# Patient Record
Sex: MALE | Race: WHITE | Employment: FULL TIME | URBAN - NONMETROPOLITAN AREA
[De-identification: names, ages, dates, MRNs, and addresses within clinical notes are randomized per-mention and may not be internally consistent; named-entity substitution may affect disease eponyms.]

---

## 2017-07-09 PROBLEM — F19.929 INTOXICATION BY DRUG (HCC): Status: ACTIVE | Noted: 2017-07-09

## 2017-07-09 PROBLEM — E11.9 DM (DIABETES MELLITUS) (HCC): Chronic | Status: ACTIVE | Noted: 2017-07-09

## 2017-07-09 PROBLEM — R41.82 ALTERED MENTAL STATUS, UNSPECIFIED: Status: ACTIVE | Noted: 2017-07-09

## 2017-07-09 PROBLEM — E10.65 UNCONTROLLED TYPE 1 DIABETES MELLITUS WITH HYPERGLYCEMIA (HCC): Chronic | Status: ACTIVE | Noted: 2017-07-09

## 2017-07-09 PROBLEM — I10 ESSENTIAL (PRIMARY) HYPERTENSION: Chronic | Status: ACTIVE | Noted: 2017-07-09

## 2018-09-17 ENCOUNTER — APPOINTMENT (OUTPATIENT)
Dept: GENERAL RADIOLOGY | Age: 43
DRG: 637 | End: 2018-09-17
Payer: OTHER GOVERNMENT

## 2018-09-17 ENCOUNTER — HOSPITAL ENCOUNTER (INPATIENT)
Age: 43
LOS: 4 days | Discharge: HOME | DRG: 637 | End: 2018-09-21
Attending: EMERGENCY MEDICINE | Admitting: FAMILY MEDICINE
Payer: OTHER GOVERNMENT

## 2018-09-17 DIAGNOSIS — R11.2 NAUSEA AND VOMITING, INTRACTABILITY OF VOMITING NOT SPECIFIED, UNSPECIFIED VOMITING TYPE: ICD-10-CM

## 2018-09-17 DIAGNOSIS — E10.10 DIABETIC KETOACIDOSIS WITHOUT COMA ASSOCIATED WITH TYPE 1 DIABETES MELLITUS (HCC): Primary | ICD-10-CM

## 2018-09-17 PROBLEM — R51.9 HEADACHE: Status: ACTIVE | Noted: 2018-09-17

## 2018-09-17 LAB
ALBUMIN SERPL-MCNC: 4.5 G/DL (ref 3.5–5.1)
ALLEN TEST: POSITIVE
ALP BLD-CCNC: 85 U/L (ref 38–126)
ALT SERPL-CCNC: 19 U/L (ref 11–66)
ANION GAP SERPL CALCULATED.3IONS-SCNC: 26 MEQ/L (ref 8–16)
ANION GAP SERPL CALCULATED.3IONS-SCNC: 36 MEQ/L (ref 8–16)
AST SERPL-CCNC: 21 U/L (ref 5–40)
BASE EXCESS (CALCULATED): -12.8 MMOL/L (ref -2.5–2.5)
BASOPHILS # BLD: 0.3 %
BASOPHILS ABSOLUTE: 0 THOU/MM3 (ref 0–0.1)
BILIRUB SERPL-MCNC: 0.4 MG/DL (ref 0.3–1.2)
BILIRUBIN DIRECT: < 0.2 MG/DL (ref 0–0.3)
BUN BLDV-MCNC: 38 MG/DL (ref 7–22)
BUN BLDV-MCNC: 40 MG/DL (ref 7–22)
CALCIUM SERPL-MCNC: 8.2 MG/DL (ref 8.5–10.5)
CALCIUM SERPL-MCNC: 8.7 MG/DL (ref 8.5–10.5)
CHLORIDE BLD-SCNC: 87 MEQ/L (ref 98–111)
CHLORIDE BLD-SCNC: 93 MEQ/L (ref 98–111)
CO2: 12 MEQ/L (ref 23–33)
CO2: 9 MEQ/L (ref 23–33)
COLLECTED BY:: ABNORMAL
CREAT SERPL-MCNC: 1.4 MG/DL (ref 0.4–1.2)
CREAT SERPL-MCNC: 1.5 MG/DL (ref 0.4–1.2)
DEVICE: ABNORMAL
EOSINOPHIL # BLD: 0 %
EOSINOPHILS ABSOLUTE: 0 THOU/MM3 (ref 0–0.4)
ERYTHROCYTE [DISTWIDTH] IN BLOOD BY AUTOMATED COUNT: 11.6 % (ref 11.5–14.5)
ERYTHROCYTE [DISTWIDTH] IN BLOOD BY AUTOMATED COUNT: 36.6 FL (ref 35–45)
GFR SERPL CREATININE-BSD FRML MDRD: 51 ML/MIN/1.73M2
GFR SERPL CREATININE-BSD FRML MDRD: 55 ML/MIN/1.73M2
GLUCOSE BLD-MCNC: 303 MG/DL (ref 70–108)
GLUCOSE BLD-MCNC: 314 MG/DL (ref 70–108)
GLUCOSE BLD-MCNC: 403 MG/DL (ref 70–108)
HCO3: 13 MMOL/L (ref 23–28)
HCT VFR BLD CALC: 47.4 % (ref 42–52)
HEMOGLOBIN: 16.9 GM/DL (ref 14–18)
IMMATURE GRANS (ABS): 0.04 THOU/MM3 (ref 0–0.07)
IMMATURE GRANULOCYTES: 0.5 %
LACTIC ACID: 2 MMOL/L (ref 0.5–2.2)
LYMPHOCYTES # BLD: 10.8 %
LYMPHOCYTES ABSOLUTE: 0.8 THOU/MM3 (ref 1–4.8)
MAGNESIUM: 2.2 MG/DL (ref 1.6–2.4)
MAGNESIUM: 2.3 MG/DL (ref 1.6–2.4)
MCH RBC QN AUTO: 30.8 PG (ref 26–33)
MCHC RBC AUTO-ENTMCNC: 35.7 GM/DL (ref 32.2–35.5)
MCV RBC AUTO: 86.5 FL (ref 80–94)
MONOCYTES # BLD: 2.3 %
MONOCYTES ABSOLUTE: 0.2 THOU/MM3 (ref 0.4–1.3)
NUCLEATED RED BLOOD CELLS: 0 /100 WBC
O2 SATURATION: 97 %
PCO2: 30 MMHG (ref 35–45)
PH BLOOD GAS: 7.25 (ref 7.35–7.45)
PHOSPHORUS: 2.9 MG/DL (ref 2.4–4.7)
PLATELET # BLD: 218 THOU/MM3 (ref 130–400)
PMV BLD AUTO: 10.8 FL (ref 9.4–12.4)
PO2: 106 MMHG (ref 71–104)
POTASSIUM SERPL-SCNC: 4.3 MEQ/L (ref 3.5–5.2)
POTASSIUM SERPL-SCNC: 4.8 MEQ/L (ref 3.5–5.2)
RBC # BLD: 5.48 MILL/MM3 (ref 4.7–6.1)
SEG NEUTROPHILS: 86.1 %
SEGMENTED NEUTROPHILS ABSOLUTE COUNT: 6.4 THOU/MM3 (ref 1.8–7.7)
SODIUM BLD-SCNC: 131 MEQ/L (ref 135–145)
SODIUM BLD-SCNC: 132 MEQ/L (ref 135–145)
SOURCE, BLOOD GAS: ABNORMAL
TOTAL PROTEIN: 7.4 G/DL (ref 6.1–8)
TROPONIN T: < 0.01 NG/ML
WBC # BLD: 7.4 THOU/MM3 (ref 4.8–10.8)

## 2018-09-17 PROCEDURE — 83735 ASSAY OF MAGNESIUM: CPT

## 2018-09-17 PROCEDURE — 83605 ASSAY OF LACTIC ACID: CPT

## 2018-09-17 PROCEDURE — 80048 BASIC METABOLIC PNL TOTAL CA: CPT

## 2018-09-17 PROCEDURE — 2060000000 HC ICU INTERMEDIATE R&B

## 2018-09-17 PROCEDURE — 80305 DRUG TEST PRSMV DIR OPT OBS: CPT

## 2018-09-17 PROCEDURE — 83036 HEMOGLOBIN GLYCOSYLATED A1C: CPT

## 2018-09-17 PROCEDURE — 82948 REAGENT STRIP/BLOOD GLUCOSE: CPT

## 2018-09-17 PROCEDURE — 96374 THER/PROPH/DIAG INJ IV PUSH: CPT

## 2018-09-17 PROCEDURE — 2709999900 HC NON-CHARGEABLE SUPPLY

## 2018-09-17 PROCEDURE — 99223 1ST HOSP IP/OBS HIGH 75: CPT | Performed by: FAMILY MEDICINE

## 2018-09-17 PROCEDURE — 99285 EMERGENCY DEPT VISIT HI MDM: CPT

## 2018-09-17 PROCEDURE — 36415 COLL VENOUS BLD VENIPUNCTURE: CPT

## 2018-09-17 PROCEDURE — 2580000003 HC RX 258: Performed by: FAMILY MEDICINE

## 2018-09-17 PROCEDURE — 36600 WITHDRAWAL OF ARTERIAL BLOOD: CPT

## 2018-09-17 PROCEDURE — 6370000000 HC RX 637 (ALT 250 FOR IP): Performed by: EMERGENCY MEDICINE

## 2018-09-17 PROCEDURE — 85025 COMPLETE CBC W/AUTO DIFF WBC: CPT

## 2018-09-17 PROCEDURE — 84484 ASSAY OF TROPONIN QUANT: CPT

## 2018-09-17 PROCEDURE — 84100 ASSAY OF PHOSPHORUS: CPT

## 2018-09-17 PROCEDURE — 87641 MR-STAPH DNA AMP PROBE: CPT

## 2018-09-17 PROCEDURE — 6360000002 HC RX W HCPCS: Performed by: EMERGENCY MEDICINE

## 2018-09-17 PROCEDURE — 87081 CULTURE SCREEN ONLY: CPT

## 2018-09-17 PROCEDURE — 80076 HEPATIC FUNCTION PANEL: CPT

## 2018-09-17 PROCEDURE — 82803 BLOOD GASES ANY COMBINATION: CPT

## 2018-09-17 PROCEDURE — 71046 X-RAY EXAM CHEST 2 VIEWS: CPT

## 2018-09-17 PROCEDURE — 2580000003 HC RX 258: Performed by: EMERGENCY MEDICINE

## 2018-09-17 RX ORDER — ONDANSETRON 2 MG/ML
4 INJECTION INTRAMUSCULAR; INTRAVENOUS EVERY 6 HOURS PRN
Status: DISCONTINUED | OUTPATIENT
Start: 2018-09-17 | End: 2018-09-21 | Stop reason: HOSPADM

## 2018-09-17 RX ORDER — GABAPENTIN 600 MG/1
600 TABLET ORAL 3 TIMES DAILY
Status: DISCONTINUED | OUTPATIENT
Start: 2018-09-17 | End: 2018-09-21 | Stop reason: HOSPADM

## 2018-09-17 RX ORDER — 0.9 % SODIUM CHLORIDE 0.9 %
15 INTRAVENOUS SOLUTION INTRAVENOUS ONCE
Status: COMPLETED | OUTPATIENT
Start: 2018-09-17 | End: 2018-09-18

## 2018-09-17 RX ORDER — ASPIRIN 81 MG/1
81 TABLET, CHEWABLE ORAL DAILY
Status: DISCONTINUED | OUTPATIENT
Start: 2018-09-18 | End: 2018-09-21 | Stop reason: HOSPADM

## 2018-09-17 RX ORDER — PANTOPRAZOLE SODIUM 40 MG/10ML
40 INJECTION, POWDER, LYOPHILIZED, FOR SOLUTION INTRAVENOUS ONCE
Status: DISCONTINUED | OUTPATIENT
Start: 2018-09-17 | End: 2018-09-21 | Stop reason: HOSPADM

## 2018-09-17 RX ORDER — POTASSIUM CHLORIDE 7.45 MG/ML
10 INJECTION INTRAVENOUS PRN
Status: DISCONTINUED | OUTPATIENT
Start: 2018-09-17 | End: 2018-09-21 | Stop reason: HOSPADM

## 2018-09-17 RX ORDER — PANTOPRAZOLE SODIUM 40 MG/1
40 TABLET, DELAYED RELEASE ORAL DAILY
Status: DISCONTINUED | OUTPATIENT
Start: 2018-09-18 | End: 2018-09-21 | Stop reason: HOSPADM

## 2018-09-17 RX ORDER — DEXTROSE MONOHYDRATE 25 G/50ML
12.5 INJECTION, SOLUTION INTRAVENOUS PRN
Status: DISCONTINUED | OUTPATIENT
Start: 2018-09-17 | End: 2018-09-17 | Stop reason: SDUPTHER

## 2018-09-17 RX ORDER — SODIUM CHLORIDE 9 MG/ML
INJECTION, SOLUTION INTRAVENOUS CONTINUOUS
Status: DISCONTINUED | OUTPATIENT
Start: 2018-09-17 | End: 2018-09-20

## 2018-09-17 RX ORDER — ACETAMINOPHEN 325 MG/1
650 TABLET ORAL EVERY 4 HOURS PRN
Status: DISCONTINUED | OUTPATIENT
Start: 2018-09-17 | End: 2018-09-21 | Stop reason: HOSPADM

## 2018-09-17 RX ORDER — LANOLIN ALCOHOL/MO/W.PET/CERES
1000 CREAM (GRAM) TOPICAL DAILY
Status: DISCONTINUED | OUTPATIENT
Start: 2018-09-18 | End: 2018-09-21 | Stop reason: HOSPADM

## 2018-09-17 RX ORDER — SIMVASTATIN 20 MG
20 TABLET ORAL NIGHTLY
Status: DISCONTINUED | OUTPATIENT
Start: 2018-09-17 | End: 2018-09-21 | Stop reason: HOSPADM

## 2018-09-17 RX ORDER — SODIUM CHLORIDE 9 MG/ML
INJECTION, SOLUTION INTRAVENOUS CONTINUOUS
Status: DISCONTINUED | OUTPATIENT
Start: 2018-09-17 | End: 2018-09-18

## 2018-09-17 RX ORDER — HYDROCODONE BITARTRATE AND ACETAMINOPHEN 5; 325 MG/1; MG/1
1 TABLET ORAL ONCE
Status: COMPLETED | OUTPATIENT
Start: 2018-09-17 | End: 2018-09-17

## 2018-09-17 RX ORDER — DEXTROSE AND SODIUM CHLORIDE 5; .45 G/100ML; G/100ML
INJECTION, SOLUTION INTRAVENOUS CONTINUOUS PRN
Status: DISCONTINUED | OUTPATIENT
Start: 2018-09-17 | End: 2018-09-21 | Stop reason: HOSPADM

## 2018-09-17 RX ORDER — HEPARIN SODIUM 5000 [USP'U]/ML
5000 INJECTION, SOLUTION INTRAVENOUS; SUBCUTANEOUS EVERY 12 HOURS
Status: DISCONTINUED | OUTPATIENT
Start: 2018-09-17 | End: 2018-09-21 | Stop reason: HOSPADM

## 2018-09-17 RX ORDER — 0.9 % SODIUM CHLORIDE 0.9 %
1000 INTRAVENOUS SOLUTION INTRAVENOUS ONCE
Status: COMPLETED | OUTPATIENT
Start: 2018-09-17 | End: 2018-09-17

## 2018-09-17 RX ORDER — ONDANSETRON 2 MG/ML
4 INJECTION INTRAMUSCULAR; INTRAVENOUS ONCE
Status: COMPLETED | OUTPATIENT
Start: 2018-09-17 | End: 2018-09-17

## 2018-09-17 RX ORDER — DEXTROSE MONOHYDRATE 25 G/50ML
12.5 INJECTION, SOLUTION INTRAVENOUS PRN
Status: DISCONTINUED | OUTPATIENT
Start: 2018-09-17 | End: 2018-09-21 | Stop reason: HOSPADM

## 2018-09-17 RX ADMIN — SODIUM CHLORIDE 4.9 UNITS/HR: 9 INJECTION, SOLUTION INTRAVENOUS at 22:03

## 2018-09-17 RX ADMIN — SODIUM CHLORIDE 987 ML: 9 INJECTION, SOLUTION INTRAVENOUS at 23:14

## 2018-09-17 RX ADMIN — SODIUM CHLORIDE 1000 ML: 9 INJECTION, SOLUTION INTRAVENOUS at 19:51

## 2018-09-17 RX ADMIN — ONDANSETRON 4 MG: 2 INJECTION INTRAMUSCULAR; INTRAVENOUS at 19:57

## 2018-09-17 RX ADMIN — HYDROCODONE BITARTRATE AND ACETAMINOPHEN 1 TABLET: 5; 325 TABLET ORAL at 19:57

## 2018-09-17 RX ADMIN — SODIUM CHLORIDE 1000 ML: 9 INJECTION, SOLUTION INTRAVENOUS at 22:03

## 2018-09-17 RX ADMIN — SODIUM CHLORIDE: 9 INJECTION, SOLUTION INTRAVENOUS at 21:32

## 2018-09-17 ASSESSMENT — ENCOUNTER SYMPTOMS
NAUSEA: 1
ABDOMINAL PAIN: 0
VOMITING: 1
SHORTNESS OF BREATH: 0
WHEEZING: 0
RHINORRHEA: 0
CONSTIPATION: 0
BLOOD IN STOOL: 0
DIARRHEA: 0
BACK PAIN: 0
SORE THROAT: 0
COUGH: 0

## 2018-09-17 ASSESSMENT — PAIN DESCRIPTION - LOCATION: LOCATION: HEAD

## 2018-09-17 ASSESSMENT — PAIN DESCRIPTION - FREQUENCY: FREQUENCY: CONTINUOUS

## 2018-09-17 ASSESSMENT — PAIN SCALES - GENERAL
PAINLEVEL_OUTOF10: 0
PAINLEVEL_OUTOF10: 7

## 2018-09-17 ASSESSMENT — PAIN DESCRIPTION - PAIN TYPE: TYPE: ACUTE PAIN

## 2018-09-17 ASSESSMENT — PAIN DESCRIPTION - DESCRIPTORS: DESCRIPTORS: HEADACHE

## 2018-09-17 NOTE — LETTER
[facility] STEPDOWN TELEMETRY 4K  [facility] [address]  Phone: [phone]             September 21, 2018    Patient: Jelani Marquez   YOB: 1975   Date of Visit: 9/17/2018       To Whom It May Concern:    Ghada Zamora was seen and treated in our facility  beginning 9/17/2018 until . He may return to work on 9/26/18.       Sincerely,       Cleopatra Lepe RN         Signature:__________________________________

## 2018-09-17 NOTE — ED PROVIDER NOTES
[address] [address]   eMERGENCY dEPARTMENT eNCOUnter        [facility]    Chief Complaint   Patient presents with    Emesis    Headache    Generalized Body Aches       Nurses Notes reviewed and I agree except as noted in the HPI. HPI    Darshan Walters is a 37 y.o. male with a past medical history of DM and DKA who presents to the ED for evaluation of nausea and myalgias. The patient has a three day history of nausea and vomiting with multiple episodes of emesis per day. The patient has been unable to tolerate any PO fluids or solids secondary to the nausea. He does not have any abdominal pain associated with the nausea and vomiting, however, has mild cramping just prior to having an episode of emesis. No diarrhea, last normal bowel movement was 4 days ago. The patient has been experiencing generalized myalgias, fatigue, and headache over the past three days as well. His headache has been intermittent, described as a dull sensation, and currently a 8/10 in severity. No known fevers, but he has had chills. No known sick contacts or food exposure, however, the patient works in [employer]. No additional complaints or concerns at the time of initial evaluation. REVIEW OF SYSTEMS    Review of Systems   Constitutional: Positive for appetite change, chills and fatigue. Negative for diaphoresis and fever. HENT: Negative for congestion, rhinorrhea and sore throat. Respiratory: Negative for cough, shortness of breath and wheezing. Cardiovascular: Negative for chest pain, palpitations and leg swelling. Gastrointestinal: Positive for nausea and vomiting. Negative for abdominal pain, blood in stool, constipation and diarrhea. Genitourinary: Negative for decreased urine volume, difficulty urinating, dysuria, frequency, hematuria and urgency. Musculoskeletal: Positive for myalgias.  Negative for arthralgias, back pain, joint swelling and neck FILTRATION RATE, ESTIMATED - Abnormal; Notable for the following:     Est, Glom Filt Rate 51 (*)     All other components within normal limits   BLOOD GAS, ARTERIAL - Abnormal; Notable for the following:     pH, Blood Gas 7.25 (*)     PCO2 30 (*)     PO2 106 (*)     HCO3 13 (*)     Base Excess (Calculated) -12.8 (*)     All other components within normal limits   POCT GLUCOSE - Abnormal; Notable for the following:     POC Glucose 303 (*)     All other components within normal limits   HEPATIC FUNCTION PANEL   MAGNESIUM   LACTIC ACID, PLASMA   HEMOGLOBIN A1C   REFERENCE LAB SAMPLE   URINE RT REFLEX TO CULTURE   POCT GLUCOSE   POCT GLUCOSE   POCT GLUCOSE     All other unresulted laboratory test above are normal:    Vitals:    Vitals:    09/17/18 1843 09/17/18 1952 09/17/18 2100   BP: 132/78 134/87 133/68   Pulse: 88 84 89   Resp: 15 14 14   Temp: 97.5 °F (36.4 °C)     TempSrc: Oral     SpO2: 100% 100% 99%   Weight: 145 lb (65.8 kg)     Height: 5' 10\" (1.778 m)         EMERGENCY DEPARTMENT COURSE:    Medications   0.9 % sodium chloride infusion ( Intravenous New Bag 9/17/18 2132)   dextrose 50 % solution 12.5 g (not administered)   insulin regular (HUMULIN R;NOVOLIN R) 100 Units in sodium chloride 0.9 % 100 mL infusion (not administered)   0.9 % sodium chloride bolus (not administered)   pantoprazole (PROTONIX) injection 40 mg (not administered)   0.9 % sodium chloride bolus (0 mLs Intravenous Stopped 9/17/18 2132)   ondansetron (ZOFRAN) injection 4 mg (4 mg Intravenous Given 9/17/18 1957)   HYDROcodone-acetaminophen (NORCO) 5-325 MG per tablet 1 tablet (1 tablet Oral Given 9/17/18 1957)       The pt was seen and evaluated by me. Within the department, I observed the pt's vital signs to be within acceptable range . Laboratory and Radiological studies were performed, results were reviewed with the patient.  Within the department, the pt was treated with vigorous fluid hydration boluses of 2 L followed by 100 mL per hour,

## 2018-09-18 PROBLEM — E43 SEVERE MALNUTRITION (HCC): Status: ACTIVE | Noted: 2018-09-18

## 2018-09-18 LAB
ALLEN TEST: POSITIVE
AMPHETAMINE+METHAMPHETAMINE URINE SCREEN: NEGATIVE
ANION GAP SERPL CALCULATED.3IONS-SCNC: 11 MEQ/L (ref 8–16)
ANION GAP SERPL CALCULATED.3IONS-SCNC: 8 MEQ/L (ref 8–16)
AVERAGE GLUCOSE: 264 MG/DL (ref 70–126)
BARBITURATE QUANTITATIVE URINE: NEGATIVE
BASE EXCESS (CALCULATED): -1.9 MMOL/L (ref -2.5–2.5)
BASOPHILS # BLD: 0.3 %
BASOPHILS ABSOLUTE: 0 THOU/MM3 (ref 0–0.1)
BENZODIAZEPINE QUANTITATIVE URINE: NEGATIVE
BUN BLDV-MCNC: 21 MG/DL (ref 7–22)
BUN BLDV-MCNC: 26 MG/DL (ref 7–22)
C-REACTIVE PROTEIN: 0.05 MG/DL (ref 0–1)
CALCIUM IONIZED: 1.08 MMOL/L (ref 1.12–1.32)
CALCIUM SERPL-MCNC: 7.4 MG/DL (ref 8.5–10.5)
CALCIUM SERPL-MCNC: 7.7 MG/DL (ref 8.5–10.5)
CANNABINOID QUANTITATIVE URINE: NEGATIVE
CHLORIDE BLD-SCNC: 102 MEQ/L (ref 98–111)
CHLORIDE BLD-SCNC: 103 MEQ/L (ref 98–111)
CO2: 22 MEQ/L (ref 23–33)
CO2: 26 MEQ/L (ref 23–33)
COCAINE METABOLITE QUANTITATIVE URINE: NEGATIVE
COLLECTED BY:: ABNORMAL
CREAT SERPL-MCNC: 1 MG/DL (ref 0.4–1.2)
CREAT SERPL-MCNC: 1 MG/DL (ref 0.4–1.2)
DEVICE: ABNORMAL
EOSINOPHIL # BLD: 0.3 %
EOSINOPHILS ABSOLUTE: 0 THOU/MM3 (ref 0–0.4)
ERYTHROCYTE [DISTWIDTH] IN BLOOD BY AUTOMATED COUNT: 11.5 % (ref 11.5–14.5)
ERYTHROCYTE [DISTWIDTH] IN BLOOD BY AUTOMATED COUNT: 35 FL (ref 35–45)
GFR SERPL CREATININE-BSD FRML MDRD: 81 ML/MIN/1.73M2
GFR SERPL CREATININE-BSD FRML MDRD: 81 ML/MIN/1.73M2
GLUCOSE BLD-MCNC: 101 MG/DL (ref 70–108)
GLUCOSE BLD-MCNC: 132 MG/DL (ref 70–108)
GLUCOSE BLD-MCNC: 146 MG/DL (ref 70–108)
GLUCOSE BLD-MCNC: 148 MG/DL (ref 70–108)
GLUCOSE BLD-MCNC: 169 MG/DL (ref 70–108)
GLUCOSE BLD-MCNC: 176 MG/DL (ref 70–108)
GLUCOSE BLD-MCNC: 192 MG/DL (ref 70–108)
GLUCOSE BLD-MCNC: 201 MG/DL (ref 70–108)
GLUCOSE BLD-MCNC: 203 MG/DL (ref 70–108)
GLUCOSE BLD-MCNC: 211 MG/DL (ref 70–108)
GLUCOSE BLD-MCNC: 216 MG/DL (ref 70–108)
GLUCOSE BLD-MCNC: 248 MG/DL (ref 70–108)
GLUCOSE BLD-MCNC: 296 MG/DL (ref 70–108)
GLUCOSE BLD-MCNC: 318 MG/DL (ref 70–108)
GLUCOSE BLD-MCNC: 388 MG/DL (ref 70–108)
GLUCOSE BLD-MCNC: 87 MG/DL (ref 70–108)
GLUCOSE BLD-MCNC: 90 MG/DL (ref 70–108)
GLUCOSE, WHOLE BLOOD: 241 MG/DL (ref 70–108)
HBA1C MFR BLD: 10.8 % (ref 4.4–6.4)
HCO3: 23 MMOL/L (ref 23–28)
HCT VFR BLD CALC: 34.1 % (ref 42–52)
HEMOGLOBIN: 12.5 GM/DL (ref 14–18)
IMMATURE GRANS (ABS): 0.03 THOU/MM3 (ref 0–0.07)
IMMATURE GRANULOCYTES: 0.5 %
LYMPHOCYTES # BLD: 25.2 %
LYMPHOCYTES ABSOLUTE: 1.6 THOU/MM3 (ref 1–4.8)
MAGNESIUM: 1.7 MG/DL (ref 1.6–2.4)
MAGNESIUM: 1.8 MG/DL (ref 1.6–2.4)
MCH RBC QN AUTO: 30.6 PG (ref 26–33)
MCHC RBC AUTO-ENTMCNC: 36.7 GM/DL (ref 32.2–35.5)
MCV RBC AUTO: 83.6 FL (ref 80–94)
MONOCYTES # BLD: 8.8 %
MONOCYTES ABSOLUTE: 0.6 THOU/MM3 (ref 0.4–1.3)
MRSA SCREEN RT-PCR: NEGATIVE
NUCLEATED RED BLOOD CELLS: 0 /100 WBC
O2 SATURATION: 98 %
OPIATES, URINE: NORMAL
ORIGINAL SAMPLE NUMBER: NORMAL
OXYCODONE: NEGATIVE
PCO2: 38 MMHG (ref 35–45)
PH BLOOD GAS: 7.39 (ref 7.35–7.45)
PHENCYCLIDINE QUANTITATIVE URINE: NEGATIVE
PHOSPHORUS: 0.8 MG/DL (ref 2.4–4.7)
PHOSPHORUS: 0.8 MG/DL (ref 2.4–4.7)
PLATELET # BLD: 143 THOU/MM3 (ref 130–400)
PMV BLD AUTO: 11 FL (ref 9.4–12.4)
PO2: 109 MMHG (ref 71–104)
POTASSIUM SERPL-SCNC: 3.4 MEQ/L (ref 3.5–5.2)
POTASSIUM SERPL-SCNC: 3.9 MEQ/L (ref 3.5–5.2)
RBC # BLD: 4.08 MILL/MM3 (ref 4.7–6.1)
REFERENCE LOCATION: NORMAL
REFERENCE RANGE: NORMAL
SEDIMENTATION RATE, ERYTHROCYTE: 1 MM/HR (ref 0–10)
SEG NEUTROPHILS: 64.9 %
SEGMENTED NEUTROPHILS ABSOLUTE COUNT: 4.2 THOU/MM3 (ref 1.8–7.7)
SODIUM BLD-SCNC: 135 MEQ/L (ref 135–145)
SODIUM BLD-SCNC: 137 MEQ/L (ref 135–145)
SOURCE, BLOOD GAS: ABNORMAL
TEST RESULTS WITH UNITS: NORMAL
TEST(S) BEING PERFORMED: NORMAL
TROPONIN T: < 0.01 NG/ML
WBC # BLD: 6.5 THOU/MM3 (ref 4.8–10.8)

## 2018-09-18 PROCEDURE — 86140 C-REACTIVE PROTEIN: CPT

## 2018-09-18 PROCEDURE — 2500000003 HC RX 250 WO HCPCS: Performed by: FAMILY MEDICINE

## 2018-09-18 PROCEDURE — 85651 RBC SED RATE NONAUTOMATED: CPT

## 2018-09-18 PROCEDURE — 80048 BASIC METABOLIC PNL TOTAL CA: CPT

## 2018-09-18 PROCEDURE — 6370000000 HC RX 637 (ALT 250 FOR IP): Performed by: FAMILY MEDICINE

## 2018-09-18 PROCEDURE — 2580000003 HC RX 258: Performed by: FAMILY MEDICINE

## 2018-09-18 PROCEDURE — 99233 SBSQ HOSP IP/OBS HIGH 50: CPT | Performed by: FAMILY MEDICINE

## 2018-09-18 PROCEDURE — 83735 ASSAY OF MAGNESIUM: CPT

## 2018-09-18 PROCEDURE — 82947 ASSAY GLUCOSE BLOOD QUANT: CPT

## 2018-09-18 PROCEDURE — 82948 REAGENT STRIP/BLOOD GLUCOSE: CPT

## 2018-09-18 PROCEDURE — 93005 ELECTROCARDIOGRAM TRACING: CPT | Performed by: FAMILY MEDICINE

## 2018-09-18 PROCEDURE — 94760 N-INVAS EAR/PLS OXIMETRY 1: CPT

## 2018-09-18 PROCEDURE — 82803 BLOOD GASES ANY COMBINATION: CPT

## 2018-09-18 PROCEDURE — 2709999900 HC NON-CHARGEABLE SUPPLY

## 2018-09-18 PROCEDURE — 2060000000 HC ICU INTERMEDIATE R&B

## 2018-09-18 PROCEDURE — 36415 COLL VENOUS BLD VENIPUNCTURE: CPT

## 2018-09-18 PROCEDURE — 84484 ASSAY OF TROPONIN QUANT: CPT

## 2018-09-18 PROCEDURE — 84100 ASSAY OF PHOSPHORUS: CPT

## 2018-09-18 PROCEDURE — 6360000002 HC RX W HCPCS: Performed by: INTERNAL MEDICINE

## 2018-09-18 PROCEDURE — 6360000002 HC RX W HCPCS: Performed by: FAMILY MEDICINE

## 2018-09-18 PROCEDURE — 36600 WITHDRAWAL OF ARTERIAL BLOOD: CPT

## 2018-09-18 PROCEDURE — 82330 ASSAY OF CALCIUM: CPT

## 2018-09-18 PROCEDURE — 85025 COMPLETE CBC W/AUTO DIFF WBC: CPT

## 2018-09-18 PROCEDURE — 2580000003 HC RX 258: Performed by: EMERGENCY MEDICINE

## 2018-09-18 RX ORDER — POTASSIUM CHLORIDE 7.45 MG/ML
10 INJECTION INTRAVENOUS
Status: COMPLETED | OUTPATIENT
Start: 2018-09-18 | End: 2018-09-18

## 2018-09-18 RX ADMIN — SODIUM CHLORIDE: 9 INJECTION, SOLUTION INTRAVENOUS at 07:27

## 2018-09-18 RX ADMIN — HEPARIN SODIUM 5000 UNITS: 5000 INJECTION INTRAVENOUS; SUBCUTANEOUS at 23:47

## 2018-09-18 RX ADMIN — DEXTROSE AND SODIUM CHLORIDE: 5; 450 INJECTION, SOLUTION INTRAVENOUS at 00:19

## 2018-09-18 RX ADMIN — HEPARIN SODIUM 5000 UNITS: 5000 INJECTION INTRAVENOUS; SUBCUTANEOUS at 10:48

## 2018-09-18 RX ADMIN — INSULIN HUMAN 1.1 UNITS: 100 INJECTION, SOLUTION PARENTERAL at 12:43

## 2018-09-18 RX ADMIN — INSULIN HUMAN 2.9 UNITS: 100 INJECTION, SOLUTION PARENTERAL at 19:59

## 2018-09-18 RX ADMIN — PANTOPRAZOLE SODIUM 40 MG: 40 TABLET, DELAYED RELEASE ORAL at 09:37

## 2018-09-18 RX ADMIN — HEPARIN SODIUM 5000 UNITS: 5000 INJECTION INTRAVENOUS; SUBCUTANEOUS at 00:15

## 2018-09-18 RX ADMIN — GABAPENTIN 600 MG: 600 TABLET, FILM COATED ORAL at 13:45

## 2018-09-18 RX ADMIN — Medication 1000 MCG: at 09:37

## 2018-09-18 RX ADMIN — ACETAMINOPHEN 650 MG: 325 TABLET ORAL at 13:44

## 2018-09-18 RX ADMIN — POTASSIUM CHLORIDE 10 MEQ: 10 INJECTION, SOLUTION INTRAVENOUS at 04:20

## 2018-09-18 RX ADMIN — GABAPENTIN 600 MG: 600 TABLET, FILM COATED ORAL at 09:36

## 2018-09-18 RX ADMIN — DEXTROSE AND SODIUM CHLORIDE: 5; 450 INJECTION, SOLUTION INTRAVENOUS at 08:15

## 2018-09-18 RX ADMIN — GABAPENTIN 600 MG: 600 TABLET, FILM COATED ORAL at 19:53

## 2018-09-18 RX ADMIN — METOPROLOL TARTRATE 25 MG: 25 TABLET ORAL at 19:53

## 2018-09-18 RX ADMIN — ASPIRIN 81 MG 81 MG: 81 TABLET ORAL at 09:35

## 2018-09-18 RX ADMIN — ACETAMINOPHEN 650 MG: 325 TABLET ORAL at 09:39

## 2018-09-18 RX ADMIN — POTASSIUM PHOSPHATE, MONOBASIC AND POTASSIUM PHOSPHATE, DIBASIC 19.32 MMOL: 224; 236 INJECTION, SOLUTION, CONCENTRATE INTRAVENOUS at 09:40

## 2018-09-18 RX ADMIN — ACETAMINOPHEN 650 MG: 325 TABLET ORAL at 18:25

## 2018-09-18 RX ADMIN — POTASSIUM CHLORIDE 10 MEQ: 10 INJECTION, SOLUTION INTRAVENOUS at 02:05

## 2018-09-18 RX ADMIN — SIMVASTATIN 20 MG: 20 TABLET, FILM COATED ORAL at 00:19

## 2018-09-18 RX ADMIN — SIMVASTATIN 20 MG: 20 TABLET, FILM COATED ORAL at 19:53

## 2018-09-18 RX ADMIN — GABAPENTIN 600 MG: 600 TABLET, FILM COATED ORAL at 00:18

## 2018-09-18 RX ADMIN — SODIUM CHLORIDE 5.6 UNITS/HR: 9 INJECTION, SOLUTION INTRAVENOUS at 00:14

## 2018-09-18 RX ADMIN — ACETAMINOPHEN 650 MG: 325 TABLET ORAL at 01:06

## 2018-09-18 RX ADMIN — INSULIN HUMAN 3.3 UNITS: 100 INJECTION, SOLUTION PARENTERAL at 16:54

## 2018-09-18 RX ADMIN — SERTRALINE 150 MG: 100 TABLET, FILM COATED ORAL at 09:36

## 2018-09-18 ASSESSMENT — PAIN DESCRIPTION - DESCRIPTORS
DESCRIPTORS: HEADACHE
DESCRIPTORS: DISCOMFORT
DESCRIPTORS: DISCOMFORT

## 2018-09-18 ASSESSMENT — PAIN DESCRIPTION - LOCATION
LOCATION: HEAD
LOCATION: HAND
LOCATION: HEAD

## 2018-09-18 ASSESSMENT — PAIN DESCRIPTION - ONSET
ONSET: ON-GOING
ONSET: ON-GOING
ONSET: SUDDEN
ONSET: ON-GOING

## 2018-09-18 ASSESSMENT — PAIN SCALES - GENERAL
PAINLEVEL_OUTOF10: 4
PAINLEVEL_OUTOF10: 9
PAINLEVEL_OUTOF10: 2
PAINLEVEL_OUTOF10: 2
PAINLEVEL_OUTOF10: 6
PAINLEVEL_OUTOF10: 2
PAINLEVEL_OUTOF10: 7
PAINLEVEL_OUTOF10: 2
PAINLEVEL_OUTOF10: 6
PAINLEVEL_OUTOF10: 2
PAINLEVEL_OUTOF10: 2
PAINLEVEL_OUTOF10: 6
PAINLEVEL_OUTOF10: 8
PAINLEVEL_OUTOF10: 7
PAINLEVEL_OUTOF10: 7
PAINLEVEL_OUTOF10: 2
PAINLEVEL_OUTOF10: 1
PAINLEVEL_OUTOF10: 6

## 2018-09-18 ASSESSMENT — PAIN DESCRIPTION - ORIENTATION
ORIENTATION: RIGHT

## 2018-09-18 ASSESSMENT — PAIN DESCRIPTION - FREQUENCY
FREQUENCY: CONTINUOUS
FREQUENCY: CONTINUOUS
FREQUENCY: INTERMITTENT
FREQUENCY: CONTINUOUS

## 2018-09-18 ASSESSMENT — PAIN DESCRIPTION - PROGRESSION
CLINICAL_PROGRESSION: NOT CHANGED
CLINICAL_PROGRESSION: GRADUALLY IMPROVING
CLINICAL_PROGRESSION: NOT CHANGED
CLINICAL_PROGRESSION: NOT CHANGED
CLINICAL_PROGRESSION: GRADUALLY IMPROVING

## 2018-09-18 ASSESSMENT — PAIN DESCRIPTION - PAIN TYPE
TYPE: ACUTE PAIN

## 2018-09-18 NOTE — PROGRESS NOTES
Recent Labs      09/17/18   1859   AST  21   ALT  19   BILIDIR  <0.2   BILITOT  0.4   ALKPHOS  85     No results for input(s): INR in the last 72 hours. No results for input(s): Fito Rosen in the last 72 hours. Urinalysis:    Lab Results   Component Value Date    NITRU NEGATIVE 03/08/2017    WBCUA NONE SEEN 03/08/2017    BACTERIA NONE 03/08/2017    RBCUA 3-5 03/08/2017    BLOODU NEGATIVE 03/08/2017    SPECGRAV 1.025 10/22/2016    GLUCOSEU >= 1000 03/08/2017       Radiology:  XR CHEST STANDARD (2 VW)   Final Result      No acute cardiopulmonary disease. Hyperinflation, see above. **This report has been created using voice recognition software. It may contain minor errors which are inherent in voice recognition technology. **      Final report electronically signed by Dr. Aron Bowser on 9/17/2018 10:29 PM          Diet: DIET CARB CONTROL;     Bashir: no    Microbiology:  MRSA screen (-)    Tele:  SR, no arrhythmias    DVT prophylaxis: [] Lovenox                                 [] SCDs                                 [x] SQ Heparin                                 [] Encourage ambulation           [] Already on Anticoagulation     Disposition:    [x] Home       [] TCU       [] Rehab       [] Psych       [] SNF       [] [facility]       [] Other-    Code Status: Full Code    PT/OT Eval Status: not needed        Electronically signed by LEANNA ROBERSON MD on 9/18/2018 at 8:54 AM when pt evaluated - final note filed late

## 2018-09-18 NOTE — H&P
mouth nightly      Insulin Pump KAI Inject 1 each into the skin See Admin Instructions      omeprazole (PRILOSEC) 20 MG capsule Take 20 mg by mouth daily.  cyanocobalamin 1000 MCG tablet Take 1,000 mcg by mouth daily.  gabapentin (NEURONTIN) 600 MG tablet Take 600 mg by mouth 3 times daily.  aspirin 81 MG tablet Take 81 mg by mouth daily. Allergies:  Patient has no known allergies. Social History:    reports that he has been smoking. He has been smoking about 0.50 packs per day. He does not have any smokeless tobacco history on file. He reports that he uses drugs, including Opiates  and Marijuana. He reports that he does not drink alcohol. Family History:   History reviewed. No pertinent family history. Review of systems:  Constitutional: no fever, no night sweats, no fatigue  Head: + headache, no head injury, no migranes. Eye: no blurring of vision, no double vision. Ears: no hearing difficulty, no tinnitus  Mouth/throat: no ulceration, dental caries, dysphagia  Lungs: no cough, no shortness of breath, no wheeze  CVS: no palpitation, no chest pain, no shortness of breath  GI: no abdominal pain, + nausea , + vomiting, no constipation  JESUS: no dysuria, frequency and urgency, no hematuria, no kidney stones  Musculoskeletal: no joint pain, swelling , stiffness, + body aches  Endocrine: no polyuria, polydypsia, no cold or heat intolerence  Hematology: no anemia, no easy brusing or bleeding, no hx of clotting disorder  Dermatology: no skin rash, no eczema, no prurities,  Psychiatry: no depression, no anxiety,no panic attacks, no suicide ideation  Neurology: no syncope, no seizures, no numbness or tingling of hands, no numbness or tingling of feet, no paresis    10 point review of systems completed, all other than noted above are negative. Vitals:   Vitals:    09/17/18 2100   BP: 133/68   Pulse: 89   Resp: 14   Temp:    SpO2: 99%      BMI: Body mass index is 20.81 kg/m². (H) 70 - 108 mg/dL    BUN 40 (H) 7 - 22 mg/dL    CREATININE 1.5 (H) 0.4 - 1.2 mg/dL    Calcium 8.7 8.5 - 10.5 mg/dL   Anion Gap    Collection Time: 09/17/18  6:59 PM   Result Value Ref Range    Anion Gap 36.0 (H) 8.0 - 16.0 meq/L   Glomerular Filtration Rate, Estimated    Collection Time: 09/17/18  6:59 PM   Result Value Ref Range    Est, Glom Filt Rate 51 (A) ml/min/1.73m2   Hepatic function panel    Collection Time: 09/17/18  6:59 PM   Result Value Ref Range    Alb 4.5 3.5 - 5.1 g/dL    Total Bilirubin 0.4 0.3 - 1.2 mg/dL    Bilirubin, Direct <0.2 0.0 - 0.3 mg/dL    Alkaline Phosphatase 85 38 - 126 U/L    AST 21 5 - 40 U/L    ALT 19 11 - 66 U/L    Total Protein 7.4 6.1 - 8.0 g/dL   Magnesium    Collection Time: 09/17/18  6:59 PM   Result Value Ref Range    Magnesium 2.3 1.6 - 2.4 mg/dL   Lactic acid, plasma    Collection Time: 09/17/18  7:48 PM   Result Value Ref Range    Lactic Acid 2.0 0.5 - 2.2 mmol/L   Blood Gas, Arterial    Collection Time: 09/17/18  9:00 PM   Result Value Ref Range    pH, Blood Gas 7.25 (L) 7.35 - 7.45    PCO2 30 (L) 35 - 45 mmhg    PO2 106 (H) 71 - 104 mmhg    HCO3 13 (L) 23 - 28 mmol/l    Base Excess (Calculated) -12.8 (L) -2.5 - 2.5 mmol/l    O2 Sat 97 %    DEVICE Room Air     Damian Test Positive     Source: R Radial     COLLECTED BY: [id number]    POCT glucose    Collection Time: 09/17/18  9:30 PM   Result Value Ref Range    POC Glucose 303 (H) 70 - 108 mg/dl       Radiology:     No results found. EKG: pending    Assessment / Plan:    1. DKA, type 1, not at goal [facility])- IV hydration, [place]uling infusion, DKA protocol, check electrolytes, trop, EKG, UA, UDS, Beta hydroxybutyrate, lactic acid, CXR  2. Acute renal injury ([facility] [address]- IV hydration, monitor BMP  3. Essential (primary) hypertension- resume home meds with parameters to hold  4. Nausea and vomiting- IV hydration, Zofran  5. Headache, poss d/t #4- Norco / Tyl prn, sed rate, crp        Dispo:  Admit, IV hydration, Insulin

## 2018-09-18 NOTE — ED NOTES
Pt resting. Denies needs. Resp regular. Family at side. Call light in reach.       Russ Frausto, LEROY  09/17/18 4259

## 2018-09-18 NOTE — ED NOTES
Protonix not given at this time. Pt states stomach is feeling better.       Tawana Rich RN  09/17/18 2704

## 2018-09-18 NOTE — ED NOTES
Pt lying in bed. Resp regular. Mom at side. Denies all needs. .       Winda Schilder, RN  09/17/18 2134       Winda Schilder, RN  09/17/18 2157

## 2018-09-18 NOTE — CARE COORDINATION
9/18/18, 9:19 AM      Karla Craft       Admitted from: ED 9/17/2018/ [facility] day: 1   Location: [address] [place] [address] Reason for admit: DKA, type 1, not at goal [facility]) [E10.10] Status: IP  Admit order signed?: yes  PMH:  has a past medical history of Diabetes mellitus ([address] Procedure: none  Pertinent abnormal Imaging:none  Medications:  Scheduled Meds:   calcium replacement protocol   Other RX Placeholder    phosphorus replacement protocol   Other RX Placeholder    potassium replacement protocol   Other RX Placeholder    potassium phosphate IVPB  19.32 mmol Intravenous Once    pantoprazole  40 mg Intravenous Once    aspirin  81 mg Oral Daily    cyanocobalamin  1,000 mcg Oral Daily    gabapentin  600 mg Oral TID    metoprolol tartrate  25 mg Oral BID    pantoprazole  40 mg Oral Daily    sertraline  150 mg Oral Daily    simvastatin  20 mg Oral Nightly    heparin (porcine)  5,000 Units Subcutaneous Q12H     Continuous Infusions:   sodium chloride Stopped (09/18/18 0814)    insulin (HUMAN R) non-weight based infusion Stopped (09/18/18 0014)    insulin (HUMAN R) non-weight based infusion 2.3 Units/hr (09/18/18 0847)    sodium chloride      dextrose 5 % and 0.45 % NaCl 150 mL/hr at 09/18/18 0815      Pertinent Info/Orders/Treatment Plan:  Insulin Gtt continued  Diet: DIET CARB CONTROL; Dietary Nutrition Supplements: Diabetic Oral Supplement   DVT Prophylaxis: SCD's ordered  Smoking status:  reports that he has been smoking Cigarettes. He has been smoking about 0.50 packs per day.  He has never used smokeless tobacco.   Influenza Vaccination Screening Completed: n/a  Pneumonia Vaccination Screening Completed: yes, up to date  PCP: Raul Wang MD  Readmission: none  Readmission Risk Score: 21%    Discharge Planning  Current Residence:  Private Residence  Living Arrangements:  Parent   Support Systems:  Family Members, Friends/Neighbors  Current Services PTA:     Potential Assistance Needed: N/A  Potential Assistance Purchasing Medications:  No  Does patient want to participate in local refill/ meds to beds program?  No  Type of Home Care Services:  None  Patient expects to be discharged to:  home with mother  Expected Discharge date:  09/25/18  Follow Up Appointment: Best Day/ Time: Monday AM    Discharge Plan: met with client; denied needs for [facility] as plans home with mother Lion Alfonso, has insulin pump, client did not anticipate transfer to [facility] as plans discharge soon; left message with Mae De La Paz RN [facility]      Evaluation: no

## 2018-09-18 NOTE — ED NOTES
Pt lying in bed, texting on phone. Resp regular. Denies needs. Call ight in reach.       Catalina Banuelos, RN  09/17/18 2484

## 2018-09-19 LAB
ANION GAP SERPL CALCULATED.3IONS-SCNC: 10 MEQ/L (ref 8–16)
BUN BLDV-MCNC: 15 MG/DL (ref 7–22)
CALCIUM SERPL-MCNC: 8.3 MG/DL (ref 8.5–10.5)
CHLORIDE BLD-SCNC: 101 MEQ/L (ref 98–111)
CO2: 26 MEQ/L (ref 23–33)
CREAT SERPL-MCNC: 0.7 MG/DL (ref 0.4–1.2)
EKG ATRIAL RATE: 76 BPM
EKG P AXIS: 92 DEGREES
EKG P-R INTERVAL: 126 MS
EKG Q-T INTERVAL: 400 MS
EKG QRS DURATION: 86 MS
EKG QTC CALCULATION (BAZETT): 450 MS
EKG R AXIS: 94 DEGREES
EKG T AXIS: 96 DEGREES
EKG VENTRICULAR RATE: 76 BPM
GFR SERPL CREATININE-BSD FRML MDRD: > 90 ML/MIN/1.73M2
GLUCOSE BLD-MCNC: 135 MG/DL (ref 70–108)
GLUCOSE BLD-MCNC: 151 MG/DL (ref 70–108)
GLUCOSE BLD-MCNC: 196 MG/DL (ref 70–108)
GLUCOSE BLD-MCNC: 217 MG/DL (ref 70–108)
GLUCOSE BLD-MCNC: 241 MG/DL (ref 70–108)
HCT VFR BLD CALC: 38.1 % (ref 42–52)
HEMOGLOBIN: 13.8 GM/DL (ref 14–18)
MAGNESIUM: 2 MG/DL (ref 1.6–2.4)
MRSA SCREEN: NORMAL
PHOSPHORUS: 1.6 MG/DL (ref 2.4–4.7)
POTASSIUM SERPL-SCNC: 4.4 MEQ/L (ref 3.5–5.2)
SODIUM BLD-SCNC: 137 MEQ/L (ref 135–145)
VRE CULTURE: NORMAL

## 2018-09-19 PROCEDURE — 84100 ASSAY OF PHOSPHORUS: CPT

## 2018-09-19 PROCEDURE — 2580000003 HC RX 258: Performed by: FAMILY MEDICINE

## 2018-09-19 PROCEDURE — 6360000002 HC RX W HCPCS: Performed by: FAMILY MEDICINE

## 2018-09-19 PROCEDURE — 85018 HEMOGLOBIN: CPT

## 2018-09-19 PROCEDURE — 83735 ASSAY OF MAGNESIUM: CPT

## 2018-09-19 PROCEDURE — 2060000000 HC ICU INTERMEDIATE R&B

## 2018-09-19 PROCEDURE — 82948 REAGENT STRIP/BLOOD GLUCOSE: CPT

## 2018-09-19 PROCEDURE — 6370000000 HC RX 637 (ALT 250 FOR IP): Performed by: FAMILY MEDICINE

## 2018-09-19 PROCEDURE — 36415 COLL VENOUS BLD VENIPUNCTURE: CPT

## 2018-09-19 PROCEDURE — 99233 SBSQ HOSP IP/OBS HIGH 50: CPT | Performed by: FAMILY MEDICINE

## 2018-09-19 PROCEDURE — 2709999900 HC NON-CHARGEABLE SUPPLY

## 2018-09-19 PROCEDURE — 80048 BASIC METABOLIC PNL TOTAL CA: CPT

## 2018-09-19 PROCEDURE — 93010 ELECTROCARDIOGRAM REPORT: CPT | Performed by: INTERNAL MEDICINE

## 2018-09-19 PROCEDURE — 2500000003 HC RX 250 WO HCPCS: Performed by: FAMILY MEDICINE

## 2018-09-19 PROCEDURE — 85014 HEMATOCRIT: CPT

## 2018-09-19 RX ORDER — DOCUSATE SODIUM 100 MG/1
100 CAPSULE, LIQUID FILLED ORAL 2 TIMES DAILY
Status: DISCONTINUED | OUTPATIENT
Start: 2018-09-19 | End: 2018-09-21 | Stop reason: HOSPADM

## 2018-09-19 RX ORDER — POLYETHYLENE GLYCOL 3350 17 G/17G
17 POWDER, FOR SOLUTION ORAL DAILY
Status: DISCONTINUED | OUTPATIENT
Start: 2018-09-19 | End: 2018-09-20

## 2018-09-19 RX ADMIN — GABAPENTIN 600 MG: 600 TABLET, FILM COATED ORAL at 20:24

## 2018-09-19 RX ADMIN — POLYETHYLENE GLYCOL 3350 17 G: 17 POWDER, FOR SOLUTION ORAL at 16:56

## 2018-09-19 RX ADMIN — ONDANSETRON 4 MG: 2 INJECTION INTRAMUSCULAR; INTRAVENOUS at 23:12

## 2018-09-19 RX ADMIN — GABAPENTIN 600 MG: 600 TABLET, FILM COATED ORAL at 14:30

## 2018-09-19 RX ADMIN — SODIUM PHOSPHATE, MONOBASIC, MONOHYDRATE 15 MMOL: 276; 142 INJECTION, SOLUTION INTRAVENOUS at 09:52

## 2018-09-19 RX ADMIN — SERTRALINE 150 MG: 100 TABLET, FILM COATED ORAL at 09:48

## 2018-09-19 RX ADMIN — PANTOPRAZOLE SODIUM 40 MG: 40 TABLET, DELAYED RELEASE ORAL at 09:48

## 2018-09-19 RX ADMIN — ASPIRIN 81 MG 81 MG: 81 TABLET ORAL at 09:48

## 2018-09-19 RX ADMIN — METOPROLOL TARTRATE 25 MG: 25 TABLET ORAL at 20:23

## 2018-09-19 RX ADMIN — SIMVASTATIN 20 MG: 20 TABLET, FILM COATED ORAL at 20:23

## 2018-09-19 RX ADMIN — HEPARIN SODIUM 5000 UNITS: 5000 INJECTION INTRAVENOUS; SUBCUTANEOUS at 23:11

## 2018-09-19 RX ADMIN — Medication 1000 MCG: at 09:48

## 2018-09-19 RX ADMIN — ONDANSETRON 4 MG: 2 INJECTION INTRAMUSCULAR; INTRAVENOUS at 16:59

## 2018-09-19 RX ADMIN — GABAPENTIN 600 MG: 600 TABLET, FILM COATED ORAL at 09:48

## 2018-09-19 RX ADMIN — ONDANSETRON 4 MG: 2 INJECTION INTRAMUSCULAR; INTRAVENOUS at 05:54

## 2018-09-19 RX ADMIN — DOCUSATE SODIUM 100 MG: 100 CAPSULE, LIQUID FILLED ORAL at 16:56

## 2018-09-19 RX ADMIN — DOCUSATE SODIUM 100 MG: 100 CAPSULE, LIQUID FILLED ORAL at 20:23

## 2018-09-19 RX ADMIN — HEPARIN SODIUM 5000 UNITS: 5000 INJECTION INTRAVENOUS; SUBCUTANEOUS at 11:38

## 2018-09-19 ASSESSMENT — PAIN DESCRIPTION - ONSET
ONSET: ON-GOING
ONSET: ON-GOING

## 2018-09-19 ASSESSMENT — PAIN SCALES - GENERAL
PAINLEVEL_OUTOF10: 6
PAINLEVEL_OUTOF10: 0
PAINLEVEL_OUTOF10: 4
PAINLEVEL_OUTOF10: 4

## 2018-09-19 ASSESSMENT — PAIN DESCRIPTION - FREQUENCY: FREQUENCY: CONTINUOUS

## 2018-09-19 ASSESSMENT — PAIN DESCRIPTION - ORIENTATION
ORIENTATION: MID
ORIENTATION: MID

## 2018-09-19 ASSESSMENT — PAIN DESCRIPTION - LOCATION
LOCATION: ABDOMEN
LOCATION: HEAD;ABDOMEN

## 2018-09-19 ASSESSMENT — PAIN DESCRIPTION - PAIN TYPE
TYPE: CHRONIC PAIN

## 2018-09-19 ASSESSMENT — PAIN DESCRIPTION - DESCRIPTORS
DESCRIPTORS: CONSTANT;DISCOMFORT
DESCRIPTORS: ACHING;DISCOMFORT

## 2018-09-19 ASSESSMENT — PAIN DESCRIPTION - PROGRESSION
CLINICAL_PROGRESSION: NOT CHANGED
CLINICAL_PROGRESSION: NOT CHANGED

## 2018-09-19 NOTE — CARE COORDINATION
Plan of care: called True Gilliland RN [facility] of potential transfer as not ready for discharge, transferred to [facility], left message; await call back  09/19/18  9:56 AM]    Update: [facility]; updated Dr. Chantel Schaffer with Attending and plans GI consult for nausea/vomiting  09/19/18  10:29 AM

## 2018-09-20 ENCOUNTER — APPOINTMENT (OUTPATIENT)
Dept: GENERAL RADIOLOGY | Age: 43
DRG: 637 | End: 2018-09-20
Payer: OTHER GOVERNMENT

## 2018-09-20 LAB
ALBUMIN SERPL-MCNC: 3.4 G/DL (ref 3.5–5.1)
ALP BLD-CCNC: 63 U/L (ref 38–126)
ALT SERPL-CCNC: 20 U/L (ref 11–66)
ANION GAP SERPL CALCULATED.3IONS-SCNC: 7 MEQ/L (ref 8–16)
AST SERPL-CCNC: 27 U/L (ref 5–40)
BASOPHILS # BLD: 0.5 %
BASOPHILS ABSOLUTE: 0 THOU/MM3 (ref 0–0.1)
BILIRUB SERPL-MCNC: 0.5 MG/DL (ref 0.3–1.2)
BUN BLDV-MCNC: 14 MG/DL (ref 7–22)
CALCIUM SERPL-MCNC: 8.9 MG/DL (ref 8.5–10.5)
CHLORIDE BLD-SCNC: 96 MEQ/L (ref 98–111)
CO2: 31 MEQ/L (ref 23–33)
CREAT SERPL-MCNC: 0.7 MG/DL (ref 0.4–1.2)
EOSINOPHIL # BLD: 1 %
EOSINOPHILS ABSOLUTE: 0.1 THOU/MM3 (ref 0–0.4)
ERYTHROCYTE [DISTWIDTH] IN BLOOD BY AUTOMATED COUNT: 11.7 % (ref 11.5–14.5)
ERYTHROCYTE [DISTWIDTH] IN BLOOD BY AUTOMATED COUNT: 35.7 FL (ref 35–45)
GFR SERPL CREATININE-BSD FRML MDRD: > 90 ML/MIN/1.73M2
GLUCOSE BLD-MCNC: 144 MG/DL (ref 70–108)
GLUCOSE BLD-MCNC: 173 MG/DL (ref 70–108)
GLUCOSE BLD-MCNC: 199 MG/DL (ref 70–108)
GLUCOSE BLD-MCNC: 264 MG/DL (ref 70–108)
GLUCOSE BLD-MCNC: 278 MG/DL (ref 70–108)
HCT VFR BLD CALC: 40.2 % (ref 42–52)
HEMOGLOBIN: 14.6 GM/DL (ref 14–18)
IMMATURE GRANS (ABS): 0.01 THOU/MM3 (ref 0–0.07)
IMMATURE GRANULOCYTES: 0.2 %
LYMPHOCYTES # BLD: 19.9 %
LYMPHOCYTES ABSOLUTE: 1.2 THOU/MM3 (ref 1–4.8)
MCH RBC QN AUTO: 30.8 PG (ref 26–33)
MCHC RBC AUTO-ENTMCNC: 36.3 GM/DL (ref 32.2–35.5)
MCV RBC AUTO: 84.8 FL (ref 80–94)
MONOCYTES # BLD: 8.3 %
MONOCYTES ABSOLUTE: 0.5 THOU/MM3 (ref 0.4–1.3)
NUCLEATED RED BLOOD CELLS: 0 /100 WBC
PHOSPHORUS: 2.8 MG/DL (ref 2.4–4.7)
PLATELET # BLD: 131 THOU/MM3 (ref 130–400)
PMV BLD AUTO: 10.9 FL (ref 9.4–12.4)
POTASSIUM SERPL-SCNC: 4 MEQ/L (ref 3.5–5.2)
RBC # BLD: 4.74 MILL/MM3 (ref 4.7–6.1)
SEG NEUTROPHILS: 70.1 %
SEGMENTED NEUTROPHILS ABSOLUTE COUNT: 4.1 THOU/MM3 (ref 1.8–7.7)
SODIUM BLD-SCNC: 134 MEQ/L (ref 135–145)
TOTAL PROTEIN: 5.6 G/DL (ref 6.1–8)
TSH SERPL DL<=0.05 MIU/L-ACNC: 0.93 UIU/ML (ref 0.4–4.2)
WBC # BLD: 5.9 THOU/MM3 (ref 4.8–10.8)

## 2018-09-20 PROCEDURE — 84443 ASSAY THYROID STIM HORMONE: CPT

## 2018-09-20 PROCEDURE — 36415 COLL VENOUS BLD VENIPUNCTURE: CPT

## 2018-09-20 PROCEDURE — 6370000000 HC RX 637 (ALT 250 FOR IP): Performed by: FAMILY MEDICINE

## 2018-09-20 PROCEDURE — 82948 REAGENT STRIP/BLOOD GLUCOSE: CPT

## 2018-09-20 PROCEDURE — 6360000002 HC RX W HCPCS: Performed by: FAMILY MEDICINE

## 2018-09-20 PROCEDURE — 80053 COMPREHEN METABOLIC PANEL: CPT

## 2018-09-20 PROCEDURE — 85025 COMPLETE CBC W/AUTO DIFF WBC: CPT

## 2018-09-20 PROCEDURE — 84100 ASSAY OF PHOSPHORUS: CPT

## 2018-09-20 PROCEDURE — 74018 RADEX ABDOMEN 1 VIEW: CPT

## 2018-09-20 PROCEDURE — 2060000000 HC ICU INTERMEDIATE R&B

## 2018-09-20 PROCEDURE — 99232 SBSQ HOSP IP/OBS MODERATE 35: CPT | Performed by: FAMILY MEDICINE

## 2018-09-20 RX ORDER — POLYETHYLENE GLYCOL 3350 17 G/17G
17 POWDER, FOR SOLUTION ORAL 2 TIMES DAILY
Status: DISCONTINUED | OUTPATIENT
Start: 2018-09-20 | End: 2018-09-21 | Stop reason: HOSPADM

## 2018-09-20 RX ADMIN — SERTRALINE 150 MG: 100 TABLET, FILM COATED ORAL at 08:44

## 2018-09-20 RX ADMIN — PANTOPRAZOLE SODIUM 40 MG: 40 TABLET, DELAYED RELEASE ORAL at 08:44

## 2018-09-20 RX ADMIN — GABAPENTIN 600 MG: 600 TABLET, FILM COATED ORAL at 13:32

## 2018-09-20 RX ADMIN — POLYETHYLENE GLYCOL 3350 17 G: 17 POWDER, FOR SOLUTION ORAL at 21:34

## 2018-09-20 RX ADMIN — METOPROLOL TARTRATE 25 MG: 25 TABLET ORAL at 08:44

## 2018-09-20 RX ADMIN — SIMVASTATIN 20 MG: 20 TABLET, FILM COATED ORAL at 21:34

## 2018-09-20 RX ADMIN — ONDANSETRON 4 MG: 2 INJECTION INTRAMUSCULAR; INTRAVENOUS at 15:56

## 2018-09-20 RX ADMIN — POLYETHYLENE GLYCOL 3350 17 G: 17 POWDER, FOR SOLUTION ORAL at 08:44

## 2018-09-20 RX ADMIN — Medication 1000 MCG: at 08:44

## 2018-09-20 RX ADMIN — HEPARIN SODIUM 5000 UNITS: 5000 INJECTION INTRAVENOUS; SUBCUTANEOUS at 11:35

## 2018-09-20 RX ADMIN — ONDANSETRON 4 MG: 2 INJECTION INTRAMUSCULAR; INTRAVENOUS at 08:43

## 2018-09-20 RX ADMIN — GABAPENTIN 600 MG: 600 TABLET, FILM COATED ORAL at 21:34

## 2018-09-20 RX ADMIN — GABAPENTIN 600 MG: 600 TABLET, FILM COATED ORAL at 08:44

## 2018-09-20 RX ADMIN — ASPIRIN 81 MG 81 MG: 81 TABLET ORAL at 08:44

## 2018-09-20 RX ADMIN — METOPROLOL TARTRATE 25 MG: 25 TABLET ORAL at 21:34

## 2018-09-20 RX ADMIN — DOCUSATE SODIUM 100 MG: 100 CAPSULE, LIQUID FILLED ORAL at 21:34

## 2018-09-20 RX ADMIN — DOCUSATE SODIUM 100 MG: 100 CAPSULE, LIQUID FILLED ORAL at 08:44

## 2018-09-20 ASSESSMENT — PAIN SCALES - GENERAL
PAINLEVEL_OUTOF10: 5
PAINLEVEL_OUTOF10: 5
PAINLEVEL_OUTOF10: 4
PAINLEVEL_OUTOF10: 5
PAINLEVEL_OUTOF10: 5

## 2018-09-20 ASSESSMENT — PAIN DESCRIPTION - PROGRESSION: CLINICAL_PROGRESSION: NOT CHANGED

## 2018-09-20 ASSESSMENT — PAIN DESCRIPTION - PAIN TYPE: TYPE: CHRONIC PAIN

## 2018-09-20 ASSESSMENT — PAIN DESCRIPTION - ORIENTATION: ORIENTATION: MID

## 2018-09-20 ASSESSMENT — PAIN DESCRIPTION - ONSET: ONSET: ON-GOING

## 2018-09-20 ASSESSMENT — PAIN DESCRIPTION - FREQUENCY: FREQUENCY: CONTINUOUS

## 2018-09-20 ASSESSMENT — PAIN DESCRIPTION - LOCATION: LOCATION: ABDOMEN

## 2018-09-20 ASSESSMENT — PAIN DESCRIPTION - DESCRIPTORS: DESCRIPTORS: ACHING

## 2018-09-20 NOTE — CONSULTS
polyuria. No dysuria. He is slightly dizzy on standing. He is  dehydrated because of diabetic ketoacidosis. PAST MEDICAL HISTORY:  Significant for diabetes, gastroparesis, and eye  surgery. MEDICATIONS:  He takes Lopressor, Prilosec, Zocor, Zoloft, Neurontin, and  vitamin B12 as well as aspirin. ALLERGIES:  None. SOCIAL HISTORY:  He occasionally smokes, but not really a lot. No heavy  alcohol abuse at this time. He drinks occasionally, but socially. No drug  abuse. FAMILY HISTORY:  No GI malignancy. PHYSICAL EXAMINATION:  GENERAL:  Pleasant, appears comfortable, not short of breath, not using  accessory muscle at the time of evaluation. VITAL SIGNS:  He is afebrile since admission. His blood pressure is  133/74, his respirations are 18, and his pulse is 76. HEENT:  His head is atraumatic. His sclerae are anicteric. His pupils are  round and reactive to light and accommodation with normal extraocular  muscular movement. Conjunctivae normal.  Oral cavity:  Not very poor. Good oral hygiene. No oral candidiasis. Tonsils are normal.  NECK:  Supple. CHEST:  Normal.  CARDIOVASCULAR:  Normal.  ABDOMEN:  Slight epigastric tenderness. No rebound. No guarding. No  stigmata of liver disease. EXTREMITIES:  No clubbing. No cyanosis. MUSCULOSKELETAL:  Grossly normal.  PSYCHOLOGICAL:  Grossly normal.    LABORATORY DATA:  His sodium and potassium are normal.  His BUN and  creatinine are normal.  His ALT and AST are normal.  His H and H on  admission are 16.9/47. 4; today are 13.8/38. 1. MCV is normal.  His chest  x-ray done today showed no acute cardiopulmonary disease. Hyperinflation  of the lungs seen. IMPRESSION:  1.  Nausea and vomiting, likely involved with gastroparesis. Also involved  likely due to diabetic ketoacidosis. 2.  Gastroparesis. 3.  Diabetes, uncontrolled for the last 35 years. The patient is with insulin pump at  this time. PLAN:  1. Upper endoscopy to be done. I last completed the procedure when the  case was completely controlled. Preferred to do as an outpatient. Cannot  be done now as long and his diabetic ketoacidosis has improved  significantly. He has to wait likely longer, he needs to have empty  stomach before we would proceed with endoscopy with history of  gastroparesis. 2.  Upper endoscopy is a must because we need to rule out gastric outlet  obstruction before we confirm diabetic ketoacidosis. If diabetic  ketoacidosis is confirmed, the patient might benefit from a referral to  [facility] on an outpatient basis. Thank you for allowing me to participate in this patient's care.         Bryan Ramey M.D.    D: 09/19/2018 19:32:32       T: 09/19/2018 22:26:05     [email or web address]  Job#: [id number]     Doc#: [id number]    CC:  Camilo Anaya M.D.

## 2018-09-20 NOTE — PROGRESS NOTES
Hospitalist Progress Note      Patient:  Chloé Grant      Unit/Bed:4K-13/013-A    YOB: 1975    MRN: [id number]       Acct: [de-identified]     PCP: Doron Pratt MD    Date of Admission: 9/17/2018    Assessment/Plan:    1. DKA POA/uncontrolled type 1 DM -- insulin gtt --> anion gap improved 9/18 and if next BMP still with closed gap will stop insulin gtt and transition to pt's own insulin pump - BS stable --  A1C 9/17/18 = 10.8 -- cont monitor   2. Metabolic acidosis -- LA normal on admission 2.0 -- due to n/v and DKA - no signs of infxn -- improving with correction of BS -- monitor  3. Hyponatremia -- due to hypovolemia and hyperglycemia -- up and down -- stopped IVF 9/20  4. Hypokalemia -- replace and monitor closely  5. ELIESER -- up to 1.5 on admission 9/17 -- due to hypovolemia with n/v -- prior normal 0.7 - 0.9 --> improving to 0.7 on 9/19 with IVF   6. Hypophosphatemia -- replace and monitor closely  7. Hypocalcemia -- replace prn  8. N/v -- ?viral - likely due to gastroparesis -- LFT 9/17 WNL -- sx cont 9/20  --> c/s GI 9/19 and note (p) -> per RN outpt EGD planned and no other recs   -- hx of gastroparesis but cannot tolerate reglan due to parkinsonian side effects and states had QT prolongation and tachycardia with erythromycin   -- can't do GES as only done on tuesdays . -- Cont prn zofran, cont protonix  -- ?due to constipation - cont and increase bowel meds 9/20   -- checked TSH 9/20 WNL  9. Abdominal pain -- generalized - crampy -- see #8 --  no BM in few days - add stool softeners and monitor -- improving 9/18 am - monitor and may need further GI w/u if continues -- LFT 9/17 WNL  10. Diabetic gastroparesis -- per pt has been dx with this - unable to take reglan due to parkinsonian SE and states erythromycin caused tachycardia and prolonged QT -- GI c/s apprec and await any further recs  11.  Normocytic anemia -- down to 12.5 on 9/18 and up to 14.6 on 9/20  from 16.9 on admission and insulin pump stopped. LFT WNL and no GI imaging done. Subjective:   --9/20/2018  --> pt states he feels \"queezy\" this am -+ n/v overnight but per RN not witnessed. Small BM this am - no melena or hematochezia. Occ abd cramping/pain. Doesn't feel at baseline and not ready to go home. No cp, sob. No lightheaded/dizziness. Medications:  Reviewed    Infusion Medications    dextrose 5 % and 0.45 % NaCl Stopped (09/18/18 1351)     Scheduled Medications    docusate sodium  100 mg Oral BID    polyethylene glycol  17 g Oral Daily    calcium replacement protocol   Other RX Placeholder    phosphorus replacement protocol   Other RX Placeholder    potassium replacement protocol   Other RX Placeholder    pantoprazole  40 mg Intravenous Once    aspirin  81 mg Oral Daily    cyanocobalamin  1,000 mcg Oral Daily    gabapentin  600 mg Oral TID    metoprolol tartrate  25 mg Oral BID    pantoprazole  40 mg Oral Daily    sertraline  150 mg Oral Daily    simvastatin  20 mg Oral Nightly    heparin (porcine)  5,000 Units Subcutaneous Q12H     PRN Meds: dextrose, insulin regular, potassium chloride, magnesium sulfate, sodium phosphate IVPB **OR** sodium phosphate IVPB **OR** sodium phosphate IVPB, dextrose 5 % and 0.45 % NaCl, acetaminophen, ondansetron      Intake/Output Summary (Last 24 hours) at 09/20/18 0907  Last data filed at 09/20/18 0409   Gross per 24 hour   Intake          1981.16 ml   Output             3400 ml   Net         -1418.84 ml       Diet:  DIET CARB CONTROL; Dietary Nutrition Supplements: Diabetic Oral Supplement    Exam:  BP (!) 151/95   Pulse 71   Temp 98.1 °F (36.7 °C) (Oral)   Resp 16   Ht 5' 10\" (1.778 m)   Wt 126 lb 4.8 oz (57.3 kg)   SpO2 98%   BMI 18.12 kg/m²     General appearance: No apparent distress, thin, chronically ill appearing, appears stated age and cooperative. HEENT: Pupils equal, round, and reactive to light. Conjunctivae/corneas clear.   Neck: Supple, with full

## 2018-09-21 VITALS
BODY MASS INDEX: 18.43 KG/M2 | OXYGEN SATURATION: 98 % | RESPIRATION RATE: 16 BRPM | HEIGHT: 70 IN | SYSTOLIC BLOOD PRESSURE: 99 MMHG | WEIGHT: 128.7 LBS | HEART RATE: 66 BPM | DIASTOLIC BLOOD PRESSURE: 65 MMHG | TEMPERATURE: 97.4 F

## 2018-09-21 LAB — GLUCOSE BLD-MCNC: 167 MG/DL (ref 70–108)

## 2018-09-21 PROCEDURE — 82948 REAGENT STRIP/BLOOD GLUCOSE: CPT

## 2018-09-21 PROCEDURE — 6370000000 HC RX 637 (ALT 250 FOR IP): Performed by: FAMILY MEDICINE

## 2018-09-21 PROCEDURE — 99238 HOSP IP/OBS DSCHRG MGMT 30/<: CPT | Performed by: INTERNAL MEDICINE

## 2018-09-21 RX ORDER — SIMVASTATIN 20 MG
20 TABLET ORAL NIGHTLY
Qty: 30 TABLET | Refills: 3 | Status: SHIPPED | OUTPATIENT
Start: 2018-09-21

## 2018-09-21 RX ADMIN — SERTRALINE 150 MG: 100 TABLET, FILM COATED ORAL at 09:30

## 2018-09-21 RX ADMIN — POLYETHYLENE GLYCOL 3350 17 G: 17 POWDER, FOR SOLUTION ORAL at 07:53

## 2018-09-21 RX ADMIN — ASPIRIN 81 MG 81 MG: 81 TABLET ORAL at 07:53

## 2018-09-21 RX ADMIN — PANTOPRAZOLE SODIUM 40 MG: 40 TABLET, DELAYED RELEASE ORAL at 07:53

## 2018-09-21 RX ADMIN — DOCUSATE SODIUM 100 MG: 100 CAPSULE, LIQUID FILLED ORAL at 07:53

## 2018-09-21 RX ADMIN — Medication 1000 MCG: at 07:53

## 2018-09-21 RX ADMIN — GABAPENTIN 600 MG: 600 TABLET, FILM COATED ORAL at 07:52

## 2018-09-21 ASSESSMENT — PAIN SCALES - GENERAL
PAINLEVEL_OUTOF10: 5
PAINLEVEL_OUTOF10: 5
PAINLEVEL_OUTOF10: 6
PAINLEVEL_OUTOF10: 5

## 2018-09-21 ASSESSMENT — PAIN DESCRIPTION - PAIN TYPE: TYPE: CHRONIC PAIN

## 2018-09-21 ASSESSMENT — PAIN DESCRIPTION - ORIENTATION: ORIENTATION: MID

## 2018-09-21 ASSESSMENT — PAIN DESCRIPTION - LOCATION: LOCATION: BACK

## 2018-09-21 NOTE — DISCHARGE SUMMARY
Significant Diagnostic Studies    Radiology:   XR ABDOMEN (KUB) (SINGLE AP VIEW)   Final Result   Findings suggesting mild constipation. Flank stripes are secured which would suggest the possibility the presence of ascites, although this can be seen in very thin patient's            **This report has been created using voice recognition software. It may contain minor errors which are inherent in voice recognition technology. **      Final report electronically signed by Dr. Tacho Renae on 9/20/2018 8:28 AM      XR CHEST STANDARD (2 VW)   Final Result      No acute cardiopulmonary disease. Hyperinflation, see above. **This report has been created using voice recognition software. It may contain minor errors which are inherent in voice recognition technology. **      Final report electronically signed by Dr. Jase Barton on 9/17/2018 10:29 PM             Consults:     STR ED TO IP CONSULT  IP CONSULT TO GI    Disposition:    [x] Home       [] TCU       [] Rehab       [] Psych       [] SNF       [] [facility]       [] Other-    Condition at Discharge: Stable    Code Status:  Prior     Patient Instructions:    Discharge lab work:    Activity: activity as tolerated  Diet: Dietary Nutrition Supplements: Diabetic Oral Supplement      Follow-up visits:   Guy Seay MD  [address]  [phone]    Go on 9/27/2018  Follow up with Family Physician, Your appointment time is at 10:30 AM, please arrive 15 minutes early, bring photo ID, insurance card, & all medications    Mellissa German MD  [address] [place] [id number] [phone]    Go on 10/3/2018  Gastroenterology, Your appointment time is at 1:45 PM, please arrive 15 minutes early, bring photo ID, insurance card, & all medications         Discharge Medications:      Mavis Lindsay   [facility] Medication Instructions [id number]    Printed on:09/21/18 5889   Medication Information

## 2018-09-21 NOTE — DISCHARGE INSTR - DIET
more fiber in your diet, eat at least 5 servings of fruits and vegetables a day, choose whole grain bread/cereal and eat more beans or legumes. Reduce your intake of high fat foods. Cutting back on your intake of high fat food can help reduce body weight and cholesterol levels. Reduce intake of fried food, addison, sausage, luncheon meat, gravy, sour cream, cheese, egg yolks and margarine/butter. Limit your intake of alcohol. Drink alcohol only with permission of your doctor. Never drink alcohol on an empty stomach. Be more active. Regular exercise is an important part of your diabetes care as exercise can help lower your blood sugar levels. The type and amount of exercise that is right for you should be discussed with your doctor.

## 2018-10-23 ENCOUNTER — APPOINTMENT (OUTPATIENT)
Dept: GENERAL RADIOLOGY | Age: 43
DRG: 638 | End: 2018-10-23
Payer: OTHER GOVERNMENT

## 2018-10-23 ENCOUNTER — HOSPITAL ENCOUNTER (INPATIENT)
Age: 43
LOS: 1 days | Discharge: HOME | DRG: 638 | End: 2018-10-24
Attending: FAMILY MEDICINE | Admitting: HOSPITALIST
Payer: OTHER GOVERNMENT

## 2018-10-23 DIAGNOSIS — R73.9 HYPERGLYCEMIA: Primary | ICD-10-CM

## 2018-10-23 PROBLEM — E10.65 CONTROLLED TYPE 1 DIABETES MELLITUS WITH HYPERGLYCEMIA (HCC): Status: ACTIVE | Noted: 2018-10-23

## 2018-10-23 LAB
ALBUMIN SERPL-MCNC: 4.1 G/DL (ref 3.5–5.1)
ALP BLD-CCNC: 79 U/L (ref 38–126)
ALT SERPL-CCNC: 16 U/L (ref 11–66)
ANION GAP SERPL CALCULATED.3IONS-SCNC: 16 MEQ/L (ref 8–16)
APTT: 27.8 SECONDS (ref 22–38)
AST SERPL-CCNC: 13 U/L (ref 5–40)
BACTERIA: ABNORMAL /HPF
BASE EXCESS MIXED: 0.2 MMOL/L (ref -2–3)
BASOPHILS # BLD: 0.8 %
BASOPHILS ABSOLUTE: 0.1 THOU/MM3 (ref 0–0.1)
BETA-HYDROXYBUTYRATE: 20.87 MG/DL (ref 0.2–2.81)
BILIRUB SERPL-MCNC: 0.8 MG/DL (ref 0.3–1.2)
BILIRUBIN URINE: NEGATIVE
BLOOD, URINE: NEGATIVE
BUN BLDV-MCNC: 26 MG/DL (ref 7–22)
CALCIUM SERPL-MCNC: 8.9 MG/DL (ref 8.5–10.5)
CASTS 2: ABNORMAL /LPF
CASTS UA: ABNORMAL /LPF
CHARACTER, URINE: CLEAR
CHLORIDE BLD-SCNC: 91 MEQ/L (ref 98–111)
CO2: 24 MEQ/L (ref 23–33)
COLLECTED BY:: ABNORMAL
COLOR: YELLOW
CREAT SERPL-MCNC: 1 MG/DL (ref 0.4–1.2)
CRYSTALS, UA: ABNORMAL
EKG ATRIAL RATE: 84 BPM
EKG P AXIS: 83 DEGREES
EKG P-R INTERVAL: 130 MS
EKG Q-T INTERVAL: 348 MS
EKG QRS DURATION: 84 MS
EKG QTC CALCULATION (BAZETT): 411 MS
EKG R AXIS: 91 DEGREES
EKG T AXIS: 66 DEGREES
EKG VENTRICULAR RATE: 84 BPM
EOSINOPHIL # BLD: 0.5 %
EOSINOPHILS ABSOLUTE: 0 THOU/MM3 (ref 0–0.4)
EPITHELIAL CELLS, UA: ABNORMAL /HPF
ERYTHROCYTE [DISTWIDTH] IN BLOOD BY AUTOMATED COUNT: 11.9 % (ref 11.5–14.5)
ERYTHROCYTE [DISTWIDTH] IN BLOOD BY AUTOMATED COUNT: 38.5 FL (ref 35–45)
GFR SERPL CREATININE-BSD FRML MDRD: 81 ML/MIN/1.73M2
GLUCOSE BLD-MCNC: 441 MG/DL (ref 70–108)
GLUCOSE BLD-MCNC: 447 MG/DL (ref 70–108)
GLUCOSE BLD-MCNC: 539 MG/DL (ref 70–108)
GLUCOSE BLD-MCNC: 577 MG/DL (ref 70–108)
GLUCOSE BLD-MCNC: 649 MG/DL (ref 70–108)
GLUCOSE URINE: >= 1000 MG/DL
HCO3, MIXED: 26 MMOL/L (ref 23–28)
HCT VFR BLD CALC: 42.7 % (ref 42–52)
HEMOGLOBIN: 14.9 GM/DL (ref 14–18)
IMMATURE GRANS (ABS): 0.02 THOU/MM3 (ref 0–0.07)
IMMATURE GRANULOCYTES: 0.3 %
INR BLD: 0.95 (ref 0.85–1.13)
KETONES, URINE: 40
LACTIC ACID: 1.1 MMOL/L (ref 0.5–2.2)
LEUKOCYTE ESTERASE, URINE: NEGATIVE
LIPASE: 14.6 U/L (ref 5.6–51.3)
LYMPHOCYTES # BLD: 11.6 %
LYMPHOCYTES ABSOLUTE: 0.9 THOU/MM3 (ref 1–4.8)
MCH RBC QN AUTO: 30.7 PG (ref 26–33)
MCHC RBC AUTO-ENTMCNC: 34.9 GM/DL (ref 32.2–35.5)
MCV RBC AUTO: 87.9 FL (ref 80–94)
MISCELLANEOUS 2: ABNORMAL
MONOCYTES # BLD: 3 %
MONOCYTES ABSOLUTE: 0.2 THOU/MM3 (ref 0.4–1.3)
NITRITE, URINE: NEGATIVE
NUCLEATED RED BLOOD CELLS: 0 /100 WBC
O2 SAT, MIXED: 82 %
OSMOLALITY CALCULATION: 298 MOSMOL/KG (ref 275–300)
PCO2, MIXED VENOUS: 48 MMHG (ref 41–51)
PH UA: 5
PH, MIXED: 7.35 (ref 7.31–7.41)
PLATELET # BLD: 180 THOU/MM3 (ref 130–400)
PMV BLD AUTO: 11.3 FL (ref 9.4–12.4)
PO2 MIXED: 49 MMHG (ref 25–40)
POTASSIUM REFLEX MAGNESIUM: 4.8 MEQ/L (ref 3.5–5.2)
PROTEIN UA: NEGATIVE
RBC # BLD: 4.86 MILL/MM3 (ref 4.7–6.1)
RBC URINE: ABNORMAL /HPF
RENAL EPITHELIAL, UA: ABNORMAL
SEG NEUTROPHILS: 83.8 %
SEGMENTED NEUTROPHILS ABSOLUTE COUNT: 6.5 THOU/MM3 (ref 1.8–7.7)
SODIUM BLD-SCNC: 131 MEQ/L (ref 135–145)
SPECIFIC GRAVITY, URINE: > 1.03 (ref 1–1.03)
TOTAL PROTEIN: 6.5 G/DL (ref 6.1–8)
TROPONIN T: < 0.01 NG/ML
UROBILINOGEN, URINE: 0.2 EU/DL
WBC # BLD: 7.7 THOU/MM3 (ref 4.8–10.8)
WBC UA: ABNORMAL /HPF
YEAST: ABNORMAL

## 2018-10-23 PROCEDURE — 85610 PROTHROMBIN TIME: CPT

## 2018-10-23 PROCEDURE — 2060000000 HC ICU INTERMEDIATE R&B

## 2018-10-23 PROCEDURE — 2580000003 HC RX 258: Performed by: FAMILY MEDICINE

## 2018-10-23 PROCEDURE — 36415 COLL VENOUS BLD VENIPUNCTURE: CPT

## 2018-10-23 PROCEDURE — 83690 ASSAY OF LIPASE: CPT

## 2018-10-23 PROCEDURE — 71046 X-RAY EXAM CHEST 2 VIEWS: CPT

## 2018-10-23 PROCEDURE — 6370000000 HC RX 637 (ALT 250 FOR IP): Performed by: FAMILY MEDICINE

## 2018-10-23 PROCEDURE — 81001 URINALYSIS AUTO W/SCOPE: CPT

## 2018-10-23 PROCEDURE — 85730 THROMBOPLASTIN TIME PARTIAL: CPT

## 2018-10-23 PROCEDURE — 96366 THER/PROPH/DIAG IV INF ADDON: CPT

## 2018-10-23 PROCEDURE — 93005 ELECTROCARDIOGRAM TRACING: CPT | Performed by: FAMILY MEDICINE

## 2018-10-23 PROCEDURE — 85025 COMPLETE CBC W/AUTO DIFF WBC: CPT

## 2018-10-23 PROCEDURE — 93010 ELECTROCARDIOGRAM REPORT: CPT | Performed by: INTERNAL MEDICINE

## 2018-10-23 PROCEDURE — 36600 WITHDRAWAL OF ARTERIAL BLOOD: CPT

## 2018-10-23 PROCEDURE — 96365 THER/PROPH/DIAG IV INF INIT: CPT

## 2018-10-23 PROCEDURE — 87081 CULTURE SCREEN ONLY: CPT

## 2018-10-23 PROCEDURE — 96375 TX/PRO/DX INJ NEW DRUG ADDON: CPT

## 2018-10-23 PROCEDURE — 83605 ASSAY OF LACTIC ACID: CPT

## 2018-10-23 PROCEDURE — 99285 EMERGENCY DEPT VISIT HI MDM: CPT

## 2018-10-23 PROCEDURE — 87641 MR-STAPH DNA AMP PROBE: CPT

## 2018-10-23 PROCEDURE — 82948 REAGENT STRIP/BLOOD GLUCOSE: CPT

## 2018-10-23 PROCEDURE — 80053 COMPREHEN METABOLIC PANEL: CPT

## 2018-10-23 PROCEDURE — 82010 KETONE BODYS QUAN: CPT

## 2018-10-23 PROCEDURE — 84484 ASSAY OF TROPONIN QUANT: CPT

## 2018-10-23 PROCEDURE — 6360000002 HC RX W HCPCS: Performed by: FAMILY MEDICINE

## 2018-10-23 RX ORDER — FENTANYL CITRATE 50 UG/ML
50 INJECTION, SOLUTION INTRAMUSCULAR; INTRAVENOUS ONCE
Status: COMPLETED | OUTPATIENT
Start: 2018-10-23 | End: 2018-10-23

## 2018-10-23 RX ORDER — SODIUM CHLORIDE 0.9 % (FLUSH) 0.9 %
10 SYRINGE (ML) INJECTION EVERY 12 HOURS SCHEDULED
Status: DISCONTINUED | OUTPATIENT
Start: 2018-10-24 | End: 2018-10-24 | Stop reason: HOSPADM

## 2018-10-23 RX ORDER — SODIUM CHLORIDE 0.9 % (FLUSH) 0.9 %
10 SYRINGE (ML) INJECTION PRN
Status: DISCONTINUED | OUTPATIENT
Start: 2018-10-23 | End: 2018-10-24 | Stop reason: HOSPADM

## 2018-10-23 RX ORDER — 0.9 % SODIUM CHLORIDE 0.9 %
1000 INTRAVENOUS SOLUTION INTRAVENOUS ONCE
Status: COMPLETED | OUTPATIENT
Start: 2018-10-23 | End: 2018-10-23

## 2018-10-23 RX ORDER — ONDANSETRON 2 MG/ML
4 INJECTION INTRAMUSCULAR; INTRAVENOUS ONCE
Status: COMPLETED | OUTPATIENT
Start: 2018-10-23 | End: 2018-10-23

## 2018-10-23 RX ORDER — HYDROXYZINE HYDROCHLORIDE 25 MG/1
25 TABLET, FILM COATED ORAL NIGHTLY
COMMUNITY

## 2018-10-23 RX ORDER — NICOTINE 21 MG/24HR
1 PATCH, TRANSDERMAL 24 HOURS TRANSDERMAL DAILY
Status: DISCONTINUED | OUTPATIENT
Start: 2018-10-24 | End: 2018-10-24 | Stop reason: HOSPADM

## 2018-10-23 RX ORDER — ONDANSETRON 2 MG/ML
4 INJECTION INTRAMUSCULAR; INTRAVENOUS EVERY 6 HOURS PRN
Status: DISCONTINUED | OUTPATIENT
Start: 2018-10-23 | End: 2018-10-24 | Stop reason: HOSPADM

## 2018-10-23 RX ADMIN — SODIUM CHLORIDE 1000 ML: 9 INJECTION, SOLUTION INTRAVENOUS at 21:12

## 2018-10-23 RX ADMIN — FENTANYL CITRATE 50 MCG: 50 INJECTION INTRAMUSCULAR; INTRAVENOUS at 20:00

## 2018-10-23 RX ADMIN — SODIUM CHLORIDE 9.6 UNITS/HR: 9 INJECTION, SOLUTION INTRAVENOUS at 21:09

## 2018-10-23 RX ADMIN — SODIUM CHLORIDE 1000 ML: 9 INJECTION, SOLUTION INTRAVENOUS at 20:00

## 2018-10-23 RX ADMIN — ONDANSETRON 4 MG: 2 INJECTION INTRAMUSCULAR; INTRAVENOUS at 20:00

## 2018-10-23 ASSESSMENT — ENCOUNTER SYMPTOMS
SHORTNESS OF BREATH: 0
DIARRHEA: 0
RHINORRHEA: 0
BACK PAIN: 0
VOMITING: 0
ABDOMINAL PAIN: 0
WHEEZING: 0
EYE DISCHARGE: 0
EYE REDNESS: 0
SORE THROAT: 0
NAUSEA: 1
COUGH: 0

## 2018-10-23 ASSESSMENT — PAIN DESCRIPTION - PAIN TYPE
TYPE: ACUTE PAIN
TYPE: ACUTE PAIN

## 2018-10-23 ASSESSMENT — PAIN DESCRIPTION - PROGRESSION: CLINICAL_PROGRESSION: NOT CHANGED

## 2018-10-23 ASSESSMENT — PAIN SCALES - GENERAL
PAINLEVEL_OUTOF10: 8
PAINLEVEL_OUTOF10: 5
PAINLEVEL_OUTOF10: 6

## 2018-10-23 ASSESSMENT — PAIN DESCRIPTION - ORIENTATION: ORIENTATION: ANTERIOR;POSTERIOR

## 2018-10-23 ASSESSMENT — PAIN DESCRIPTION - LOCATION
LOCATION: HEAD
LOCATION: HEAD

## 2018-10-23 ASSESSMENT — PAIN DESCRIPTION - ONSET: ONSET: ON-GOING

## 2018-10-24 VITALS
HEART RATE: 72 BPM | WEIGHT: 131.2 LBS | HEIGHT: 70 IN | DIASTOLIC BLOOD PRESSURE: 78 MMHG | BODY MASS INDEX: 18.78 KG/M2 | RESPIRATION RATE: 16 BRPM | OXYGEN SATURATION: 99 % | SYSTOLIC BLOOD PRESSURE: 128 MMHG | TEMPERATURE: 98.4 F

## 2018-10-24 PROBLEM — E10.65 TYPE 1 DIABETES MELLITUS WITH HYPERGLYCEMIA, WITH LONG-TERM CURRENT USE OF INSULIN (HCC): Status: ACTIVE | Noted: 2018-10-24

## 2018-10-24 LAB
ALBUMIN SERPL-MCNC: 3.2 G/DL (ref 3.5–5.1)
ALP BLD-CCNC: 60 U/L (ref 38–126)
ALT SERPL-CCNC: 12 U/L (ref 11–66)
AMPHETAMINE+METHAMPHETAMINE URINE SCREEN: NEGATIVE
ANION GAP SERPL CALCULATED.3IONS-SCNC: 7 MEQ/L (ref 8–16)
AST SERPL-CCNC: 13 U/L (ref 5–40)
AVERAGE GLUCOSE: 261 MG/DL (ref 70–126)
BARBITURATE QUANTITATIVE URINE: NEGATIVE
BASOPHILS # BLD: 0.9 %
BASOPHILS ABSOLUTE: 0.1 THOU/MM3 (ref 0–0.1)
BENZODIAZEPINE QUANTITATIVE URINE: NEGATIVE
BETA-HYDROXYBUTYRATE: 1.33 MG/DL (ref 0.2–2.81)
BILIRUB SERPL-MCNC: 0.4 MG/DL (ref 0.3–1.2)
BUN BLDV-MCNC: 20 MG/DL (ref 7–22)
CALCIUM SERPL-MCNC: 8.3 MG/DL (ref 8.5–10.5)
CANNABINOID QUANTITATIVE URINE: NEGATIVE
CHLORIDE BLD-SCNC: 105 MEQ/L (ref 98–111)
CO2: 30 MEQ/L (ref 23–33)
COCAINE METABOLITE QUANTITATIVE URINE: NEGATIVE
CREAT SERPL-MCNC: 0.9 MG/DL (ref 0.4–1.2)
EOSINOPHIL # BLD: 3.9 %
EOSINOPHILS ABSOLUTE: 0.3 THOU/MM3 (ref 0–0.4)
ERYTHROCYTE [DISTWIDTH] IN BLOOD BY AUTOMATED COUNT: 12.1 % (ref 11.5–14.5)
ERYTHROCYTE [DISTWIDTH] IN BLOOD BY AUTOMATED COUNT: 39.7 FL (ref 35–45)
GFR SERPL CREATININE-BSD FRML MDRD: > 90 ML/MIN/1.73M2
GLUCOSE BLD-MCNC: 103 MG/DL (ref 70–108)
GLUCOSE BLD-MCNC: 116 MG/DL (ref 70–108)
GLUCOSE BLD-MCNC: 120 MG/DL (ref 70–108)
GLUCOSE BLD-MCNC: 136 MG/DL (ref 70–108)
GLUCOSE BLD-MCNC: 136 MG/DL (ref 70–108)
GLUCOSE BLD-MCNC: 138 MG/DL (ref 70–108)
GLUCOSE BLD-MCNC: 160 MG/DL (ref 70–108)
GLUCOSE BLD-MCNC: 170 MG/DL (ref 70–108)
GLUCOSE BLD-MCNC: 182 MG/DL (ref 70–108)
GLUCOSE BLD-MCNC: 187 MG/DL (ref 70–108)
GLUCOSE BLD-MCNC: 213 MG/DL (ref 70–108)
GLUCOSE BLD-MCNC: 240 MG/DL (ref 70–108)
GLUCOSE BLD-MCNC: 243 MG/DL (ref 70–108)
GLUCOSE BLD-MCNC: 262 MG/DL (ref 70–108)
GLUCOSE BLD-MCNC: 290 MG/DL (ref 70–108)
HBA1C MFR BLD: 10.7 % (ref 4.4–6.4)
HCT VFR BLD CALC: 39.1 % (ref 42–52)
HEMOGLOBIN: 13.5 GM/DL (ref 14–18)
IMMATURE GRANS (ABS): 0.02 THOU/MM3 (ref 0–0.07)
IMMATURE GRANULOCYTES: 0.2 %
INR BLD: 0.89 (ref 0.85–1.13)
LACTIC ACID: 0.8 MMOL/L (ref 0.5–2.2)
LYMPHOCYTES # BLD: 36.4 %
LYMPHOCYTES ABSOLUTE: 3 THOU/MM3 (ref 1–4.8)
MAGNESIUM: 2 MG/DL (ref 1.6–2.4)
MCH RBC QN AUTO: 30.6 PG (ref 26–33)
MCHC RBC AUTO-ENTMCNC: 34.5 GM/DL (ref 32.2–35.5)
MCV RBC AUTO: 88.7 FL (ref 80–94)
MONOCYTES # BLD: 7.6 %
MONOCYTES ABSOLUTE: 0.6 THOU/MM3 (ref 0.4–1.3)
MRSA SCREEN RT-PCR: NEGATIVE
NUCLEATED RED BLOOD CELLS: 0 /100 WBC
OPIATES, URINE: POSITIVE
OXYCODONE: NEGATIVE
PHENCYCLIDINE QUANTITATIVE URINE: NEGATIVE
PLATELET # BLD: 171 THOU/MM3 (ref 130–400)
PMV BLD AUTO: 11 FL (ref 9.4–12.4)
POTASSIUM REFLEX MAGNESIUM: 4.3 MEQ/L (ref 3.5–5.2)
RBC # BLD: 4.41 MILL/MM3 (ref 4.7–6.1)
SEG NEUTROPHILS: 51 %
SEGMENTED NEUTROPHILS ABSOLUTE COUNT: 4.2 THOU/MM3 (ref 1.8–7.7)
SODIUM BLD-SCNC: 142 MEQ/L (ref 135–145)
T4 FREE: 0.92 NG/DL (ref 0.93–1.76)
TOTAL PROTEIN: 5.3 G/DL (ref 6.1–8)
TSH SERPL DL<=0.05 MIU/L-ACNC: 0.73 UIU/ML (ref 0.4–4.2)
VITAMIN D 25-HYDROXY: 30 NG/ML (ref 30–100)
WBC # BLD: 8.2 THOU/MM3 (ref 4.8–10.8)

## 2018-10-24 PROCEDURE — 84443 ASSAY THYROID STIM HORMONE: CPT

## 2018-10-24 PROCEDURE — 6360000002 HC RX W HCPCS: Performed by: HOSPITALIST

## 2018-10-24 PROCEDURE — 82948 REAGENT STRIP/BLOOD GLUCOSE: CPT

## 2018-10-24 PROCEDURE — 83735 ASSAY OF MAGNESIUM: CPT

## 2018-10-24 PROCEDURE — 99223 1ST HOSP IP/OBS HIGH 75: CPT | Performed by: HOSPITALIST

## 2018-10-24 PROCEDURE — 2580000003 HC RX 258: Performed by: HOSPITALIST

## 2018-10-24 PROCEDURE — 82306 VITAMIN D 25 HYDROXY: CPT

## 2018-10-24 PROCEDURE — 80053 COMPREHEN METABOLIC PANEL: CPT

## 2018-10-24 PROCEDURE — 83036 HEMOGLOBIN GLYCOSYLATED A1C: CPT

## 2018-10-24 PROCEDURE — 36415 COLL VENOUS BLD VENIPUNCTURE: CPT

## 2018-10-24 PROCEDURE — 2709999900 HC NON-CHARGEABLE SUPPLY

## 2018-10-24 PROCEDURE — 85610 PROTHROMBIN TIME: CPT

## 2018-10-24 PROCEDURE — 83605 ASSAY OF LACTIC ACID: CPT

## 2018-10-24 PROCEDURE — 85025 COMPLETE CBC W/AUTO DIFF WBC: CPT

## 2018-10-24 PROCEDURE — 84439 ASSAY OF FREE THYROXINE: CPT

## 2018-10-24 PROCEDURE — 82010 KETONE BODYS QUAN: CPT

## 2018-10-24 PROCEDURE — 80307 DRUG TEST PRSMV CHEM ANLYZR: CPT

## 2018-10-24 PROCEDURE — 99238 HOSP IP/OBS DSCHRG MGMT 30/<: CPT | Performed by: INTERNAL MEDICINE

## 2018-10-24 RX ORDER — MORPHINE SULFATE 2 MG/ML
2 INJECTION, SOLUTION INTRAMUSCULAR; INTRAVENOUS ONCE
Status: COMPLETED | OUTPATIENT
Start: 2018-10-24 | End: 2018-10-24

## 2018-10-24 RX ORDER — SODIUM CHLORIDE 9 MG/ML
INJECTION, SOLUTION INTRAVENOUS CONTINUOUS
Status: DISCONTINUED | OUTPATIENT
Start: 2018-10-24 | End: 2018-10-24 | Stop reason: HOSPADM

## 2018-10-24 RX ORDER — NICOTINE POLACRILEX 4 MG
15 LOZENGE BUCCAL PRN
Status: DISCONTINUED | OUTPATIENT
Start: 2018-10-24 | End: 2018-10-24 | Stop reason: HOSPADM

## 2018-10-24 RX ORDER — DEXTROSE MONOHYDRATE 50 MG/ML
100 INJECTION, SOLUTION INTRAVENOUS PRN
Status: DISCONTINUED | OUTPATIENT
Start: 2018-10-24 | End: 2018-10-24 | Stop reason: HOSPADM

## 2018-10-24 RX ORDER — DEXTROSE MONOHYDRATE 25 G/50ML
12.5 INJECTION, SOLUTION INTRAVENOUS PRN
Status: DISCONTINUED | OUTPATIENT
Start: 2018-10-24 | End: 2018-10-24 | Stop reason: HOSPADM

## 2018-10-24 RX ADMIN — SODIUM CHLORIDE: 9 INJECTION, SOLUTION INTRAVENOUS at 01:32

## 2018-10-24 RX ADMIN — MORPHINE SULFATE 2 MG: 2 INJECTION, SOLUTION INTRAMUSCULAR; INTRAVENOUS at 01:32

## 2018-10-24 RX ADMIN — SODIUM CHLORIDE: 9 INJECTION, SOLUTION INTRAVENOUS at 10:37

## 2018-10-24 RX ADMIN — ENOXAPARIN SODIUM 40 MG: 40 INJECTION SUBCUTANEOUS at 08:48

## 2018-10-24 ASSESSMENT — PAIN DESCRIPTION - ONSET: ONSET: ON-GOING

## 2018-10-24 ASSESSMENT — PAIN DESCRIPTION - ORIENTATION: ORIENTATION: ANTERIOR;POSTERIOR

## 2018-10-24 ASSESSMENT — PAIN SCALES - GENERAL
PAINLEVEL_OUTOF10: 4
PAINLEVEL_OUTOF10: 0
PAINLEVEL_OUTOF10: 4
PAINLEVEL_OUTOF10: 5
PAINLEVEL_OUTOF10: 2
PAINLEVEL_OUTOF10: 5
PAINLEVEL_OUTOF10: 0

## 2018-10-24 ASSESSMENT — PAIN DESCRIPTION - LOCATION
LOCATION: HEAD

## 2018-10-24 ASSESSMENT — PAIN DESCRIPTION - PAIN TYPE
TYPE: ACUTE PAIN

## 2018-10-24 ASSESSMENT — PAIN DESCRIPTION - PROGRESSION: CLINICAL_PROGRESSION: NOT CHANGED

## 2018-10-24 NOTE — ED NOTES
Pt sleeping on side with fiance at bedside. He states his pain is still a 6/10. Blood sugar reassessed and insulin gtt started.  Updated on 1006 Louis Nath RN  10/23/18 2125

## 2018-10-24 NOTE — PROGRESS NOTES
1200: Dr. Debra Handley instructed pt to use his insulin pump.   1350: , Pt dosing own insulin thru pump.  Basal rate of 0.9

## 2018-10-24 NOTE — H&P
131*   K  4.8   CL  91*   CO2  24   BUN  26*   CREATININE  1.0   CALCIUM  8.9     Recent Labs      10/23/18   1939   AST  13   ALT  16   BILITOT  0.8   ALKPHOS  79     Recent Labs      10/23/18   1939   INR  0.95     No results for input(s): Glorious Bunde in the last 72 hours. Urinalysis:      Lab Results   Component Value Date    NITRU NEGATIVE 10/23/2018    WBCUA NONE SEEN 10/23/2018    BACTERIA NONE 10/23/2018    RBCUA NONE SEEN 10/23/2018    BLOODU NEGATIVE 10/23/2018    SPECGRAV 1.025 10/22/2016    GLUCOSEU >= 1000 10/23/2018       Intake & Output:  No intake/output data recorded. No intake/output data recorded. Radiology:     CXR: I have reviewed the CXR with the following interpretation: No acute cardiopulmonary disease. EKG:  I have reviewed the EKG with the following interpretation: Normal sinus rhythm with rate 84/m    XR CHEST STANDARD (2 VW)   Final Result      No acute cardiopulmonary disease. Hyperinflation. **This report has been created using voice recognition software. It may contain minor errors which are inherent in voice recognition technology. **      Final report electronically signed by Dr. Finn Lowe on 10/23/2018 8:00 PM               DVT prophylaxis: [x] Lovenox                                 [] SCDs                                 [] SQ Heparin                                 [] Encourage ambulation           [] Already on Anticoagulation    Code Status: Full      PT/OT Eval Status: Pending    Disposition:    [x] Home       [] TCU       [] Rehab       [] Psych       [] SNF       [] [facility]       [] Other-    ASSESSMENT/PLAN:    Intractable Headaches, etiology unknown at this time. IV Morphine    DM 1 with hyperglycemia. NPO. IV insulin protocol. Hemoglobin A1c consult endocrinology    dehydration, due to uncontrolled diabetes mellitus, inadequate water intake.   Generous IVf rehydration    Hyponatremia, likely due to dehydration, uncontrolled

## 2018-10-26 LAB
MRSA SCREEN: NORMAL
VRE CULTURE: NORMAL

## 2018-10-27 NOTE — DISCHARGE SUMMARY
jugular venous distention. Trachea midline. Respiratory:  Normal respiratory effort. Clear to auscultation, bilaterally without Rales/Wheezes/Rhonchi. Cardiovascular:  Regular rate and rhythm with normal S1/S2 without murmurs, rubs or gallops. Abdomen: Soft, non-tender, non-distended with normal bowel sounds. Musculoskeletal:  No clubbing, cyanosis or edema bilaterally. Full range of motion without deformity. Skin: Skin color, texture, turgor normal.  No rashes or lesions. Neurologic:  Neurovascularly intact without any focal sensory/motor deficits. Cranial nerves: II-XII intact, grossly non-focal.  Psychiatric:  Alert and oriented, thought content appropriate, normal insight  Capillary Refill: Brisk,< 3 seconds   Peripheral Pulses: +2 palpable, equal bilaterally       Labs: For convenience and continuity at follow-up the following most recent labs are provided:      CBC:    Lab Results   Component Value Date    WBC 8.2 10/24/2018    HGB 13.5 10/24/2018    HCT 39.1 10/24/2018     10/24/2018       Renal:    Lab Results   Component Value Date     10/24/2018    K 4.3 10/24/2018     10/24/2018    CO2 30 10/24/2018    BUN 20 10/24/2018    CREATININE 0.9 10/24/2018    CALCIUM 8.3 10/24/2018    PHOS 2.8 09/20/2018         Significant Diagnostic Studies    Radiology:   XR CHEST STANDARD (2 VW)   Final Result      No acute cardiopulmonary disease. Hyperinflation. **This report has been created using voice recognition software. It may contain minor errors which are inherent in voice recognition technology. **      Final report electronically signed by Dr. Denzel Collins on 10/23/2018 8:00 PM             Consults:     None    Disposition:    [x] Home       [] TCU       [] Rehab       [] Psych       [] SNF       [] [facility]       [] Other-    Condition at Discharge: Stable    Code Status:  Prior     Patient Instructions:    Discharge lab work: none  Activity: activity as

## 2019-05-20 ENCOUNTER — HOSPITAL ENCOUNTER (INPATIENT)
Age: 44
LOS: 3 days | Discharge: HOME | DRG: 639 | End: 2019-05-23
Attending: FAMILY MEDICINE | Admitting: FAMILY MEDICINE
Payer: OTHER GOVERNMENT

## 2019-05-20 DIAGNOSIS — E86.0 DEHYDRATION: ICD-10-CM

## 2019-05-20 DIAGNOSIS — R73.9 HYPERGLYCEMIA: Primary | ICD-10-CM

## 2019-05-20 DIAGNOSIS — E87.5 HYPERKALEMIA: ICD-10-CM

## 2019-05-20 LAB
AMPHETAMINE+METHAMPHETAMINE URINE SCREEN: NEGATIVE
ANION GAP SERPL CALCULATED.3IONS-SCNC: 18 MEQ/L (ref 8–16)
BARBITURATE QUANTITATIVE URINE: NEGATIVE
BASE EXCESS MIXED: 1.1 MMOL/L (ref -2–3)
BASOPHILS # BLD: 0.9 %
BASOPHILS ABSOLUTE: 0.1 THOU/MM3 (ref 0–0.1)
BENZODIAZEPINE QUANTITATIVE URINE: NEGATIVE
BETA-HYDROXYBUTYRATE: 33.78 MG/DL (ref 0.2–2.81)
BILIRUBIN URINE: ABNORMAL
BLOOD, URINE: NEGATIVE
BUN BLDV-MCNC: 31 MG/DL (ref 7–22)
CALCIUM SERPL-MCNC: 9.1 MG/DL (ref 8.5–10.5)
CANNABINOID QUANTITATIVE URINE: NEGATIVE
CHARACTER, URINE: CLEAR
CHLORIDE BLD-SCNC: 93 MEQ/L (ref 98–111)
CHP ED QC CHECK: 441
CO2: 23 MEQ/L (ref 23–33)
COCAINE METABOLITE QUANTITATIVE URINE: NEGATIVE
COLLECTED BY:: NORMAL
COLOR: YELLOW
CREAT SERPL-MCNC: 1 MG/DL (ref 0.4–1.2)
DEVICE: NORMAL
EOSINOPHIL # BLD: 1.3 %
EOSINOPHILS ABSOLUTE: 0.1 THOU/MM3 (ref 0–0.4)
ERYTHROCYTE [DISTWIDTH] IN BLOOD BY AUTOMATED COUNT: 11.8 % (ref 11.5–14.5)
ERYTHROCYTE [DISTWIDTH] IN BLOOD BY AUTOMATED COUNT: 37.2 FL (ref 35–45)
GFR SERPL CREATININE-BSD FRML MDRD: 81 ML/MIN/1.73M2
GLUCOSE BLD-MCNC: 344 MG/DL (ref 70–108)
GLUCOSE BLD-MCNC: 399 MG/DL (ref 70–108)
GLUCOSE BLD-MCNC: 441 MG/DL (ref 70–108)
GLUCOSE BLD-MCNC: 520 MG/DL (ref 70–108)
GLUCOSE URINE: >= 1000 MG/DL
HCO3, MIXED: 27 MMOL/L (ref 23–28)
HCT VFR BLD CALC: 42.4 % (ref 42–52)
HEMOGLOBIN: 15.2 GM/DL (ref 14–18)
ICTOTEST: NEGATIVE
IMMATURE GRANS (ABS): 0.01 THOU/MM3 (ref 0–0.07)
IMMATURE GRANULOCYTES: 0.1 %
KETONES, URINE: >= 80
LEUKOCYTE ESTERASE, URINE: NEGATIVE
LYMPHOCYTES # BLD: 19.8 %
LYMPHOCYTES ABSOLUTE: 1.4 THOU/MM3 (ref 1–4.8)
MCH RBC QN AUTO: 31.1 PG (ref 26–33)
MCHC RBC AUTO-ENTMCNC: 35.8 GM/DL (ref 32.2–35.5)
MCV RBC AUTO: 86.7 FL (ref 80–94)
MONOCYTES # BLD: 7.8 %
MONOCYTES ABSOLUTE: 0.5 THOU/MM3 (ref 0.4–1.3)
NITRITE, URINE: NEGATIVE
NUCLEATED RED BLOOD CELLS: 0 /100 WBC
O2 SAT, MIXED: 45 %
OPIATES, URINE: NEGATIVE
OSMOLALITY CALCULATION: 298.2 MOSMOL/KG (ref 275–300)
OXYCODONE: NEGATIVE
PCO2, MIXED VENOUS: 46 MMHG (ref 41–51)
PH UA: 5.5 (ref 5–9)
PH, MIXED: 7.38 (ref 7.31–7.41)
PHENCYCLIDINE QUANTITATIVE URINE: NEGATIVE
PLATELET # BLD: 90 THOU/MM3 (ref 130–400)
PLATELET ESTIMATE: ABNORMAL
PMV BLD AUTO: 11.6 FL (ref 9.4–12.4)
PO2 MIXED: 26 MMHG (ref 25–40)
POTASSIUM SERPL-SCNC: 5.9 MEQ/L (ref 3.5–5.2)
PROTEIN UA: NEGATIVE
RBC # BLD: 4.89 MILL/MM3 (ref 4.7–6.1)
SCAN OF BLOOD SMEAR: NORMAL
SEG NEUTROPHILS: 70.1 %
SEGMENTED NEUTROPHILS ABSOLUTE COUNT: 4.8 THOU/MM3 (ref 1.8–7.7)
SITE: NORMAL
SODIUM BLD-SCNC: 134 MEQ/L (ref 135–145)
SPECIFIC GRAVITY, URINE: 1.01 (ref 1–1.03)
UROBILINOGEN, URINE: 0.2 EU/DL (ref 0–1)
WBC # BLD: 6.9 THOU/MM3 (ref 4.8–10.8)

## 2019-05-20 PROCEDURE — 82803 BLOOD GASES ANY COMBINATION: CPT

## 2019-05-20 PROCEDURE — 82010 KETONE BODYS QUAN: CPT

## 2019-05-20 PROCEDURE — 81003 URINALYSIS AUTO W/O SCOPE: CPT

## 2019-05-20 PROCEDURE — 82948 REAGENT STRIP/BLOOD GLUCOSE: CPT

## 2019-05-20 PROCEDURE — 6370000000 HC RX 637 (ALT 250 FOR IP): Performed by: FAMILY MEDICINE

## 2019-05-20 PROCEDURE — 83036 HEMOGLOBIN GLYCOSYLATED A1C: CPT

## 2019-05-20 PROCEDURE — 99284 EMERGENCY DEPT VISIT MOD MDM: CPT

## 2019-05-20 PROCEDURE — 80307 DRUG TEST PRSMV CHEM ANLYZR: CPT

## 2019-05-20 PROCEDURE — 99223 1ST HOSP IP/OBS HIGH 75: CPT | Performed by: INTERNAL MEDICINE

## 2019-05-20 PROCEDURE — 6360000002 HC RX W HCPCS: Performed by: FAMILY MEDICINE

## 2019-05-20 PROCEDURE — 96374 THER/PROPH/DIAG INJ IV PUSH: CPT

## 2019-05-20 PROCEDURE — 2060000000 HC ICU INTERMEDIATE R&B

## 2019-05-20 PROCEDURE — 36415 COLL VENOUS BLD VENIPUNCTURE: CPT

## 2019-05-20 PROCEDURE — 2580000003 HC RX 258: Performed by: FAMILY MEDICINE

## 2019-05-20 PROCEDURE — 80048 BASIC METABOLIC PNL TOTAL CA: CPT

## 2019-05-20 PROCEDURE — 85025 COMPLETE CBC W/AUTO DIFF WBC: CPT

## 2019-05-20 RX ORDER — 0.9 % SODIUM CHLORIDE 0.9 %
2000 INTRAVENOUS SOLUTION INTRAVENOUS ONCE
Status: COMPLETED | OUTPATIENT
Start: 2019-05-20 | End: 2019-05-20

## 2019-05-20 RX ORDER — DEXTROSE MONOHYDRATE 25 G/50ML
12.5 INJECTION, SOLUTION INTRAVENOUS PRN
Status: DISCONTINUED | OUTPATIENT
Start: 2019-05-20 | End: 2019-05-21 | Stop reason: SDUPTHER

## 2019-05-20 RX ORDER — ONDANSETRON 2 MG/ML
4 INJECTION INTRAMUSCULAR; INTRAVENOUS ONCE
Status: COMPLETED | OUTPATIENT
Start: 2019-05-20 | End: 2019-05-20

## 2019-05-20 RX ADMIN — ONDANSETRON 4 MG: 2 INJECTION INTRAMUSCULAR; INTRAVENOUS at 20:27

## 2019-05-20 RX ADMIN — SODIUM CHLORIDE 1000 ML: 9 INJECTION, SOLUTION INTRAVENOUS at 20:27

## 2019-05-20 RX ADMIN — SODIUM CHLORIDE 0.5 UNITS/HR: 9 INJECTION, SOLUTION INTRAVENOUS at 22:15

## 2019-05-20 ASSESSMENT — ENCOUNTER SYMPTOMS
COUGH: 0
DIARRHEA: 0
RHINORRHEA: 0
VOMITING: 1
EYE DISCHARGE: 0
WHEEZING: 0
NAUSEA: 1
SHORTNESS OF BREATH: 0
SORE THROAT: 0
EYE REDNESS: 0
BACK PAIN: 0
ABDOMINAL PAIN: 0

## 2019-05-20 ASSESSMENT — PAIN DESCRIPTION - PAIN TYPE: TYPE: CHRONIC PAIN

## 2019-05-20 ASSESSMENT — PAIN SCALES - GENERAL: PAINLEVEL_OUTOF10: 7

## 2019-05-20 ASSESSMENT — PAIN DESCRIPTION - LOCATION: LOCATION: BACK

## 2019-05-20 NOTE — ED TRIAGE NOTES
Pt to ed c/o emesis and body aches for the last 4 days. Pt reports a hx of dka and reports that is his main concern. accu- check obtained 344 mg/dl.  Pt waiting on room for further eval.

## 2019-05-21 ENCOUNTER — APPOINTMENT (OUTPATIENT)
Dept: GENERAL RADIOLOGY | Age: 44
DRG: 639 | End: 2019-05-21
Payer: OTHER GOVERNMENT

## 2019-05-21 PROBLEM — E10.10 DIABETIC KETOACIDOSIS ASSOCIATED WITH TYPE 1 DIABETES MELLITUS (HCC): Status: ACTIVE | Noted: 2019-05-21

## 2019-05-21 PROBLEM — J06.9 URI (UPPER RESPIRATORY INFECTION): Status: ACTIVE | Noted: 2019-05-21

## 2019-05-21 LAB
ALBUMIN SERPL-MCNC: 3.6 G/DL (ref 3.5–5.1)
ANION GAP SERPL CALCULATED.3IONS-SCNC: 13 MEQ/L (ref 8–16)
ANION GAP SERPL CALCULATED.3IONS-SCNC: 9 MEQ/L (ref 8–16)
ANION GAP SERPL CALCULATED.3IONS-SCNC: 9 MEQ/L (ref 8–16)
AVERAGE GLUCOSE: 222 MG/DL (ref 70–126)
BASOPHILS # BLD: 0.6 %
BASOPHILS ABSOLUTE: 0.1 THOU/MM3 (ref 0–0.1)
BILIRUBIN URINE: ABNORMAL
BLOOD, URINE: NEGATIVE
BUN BLDV-MCNC: 18 MG/DL (ref 7–22)
BUN BLDV-MCNC: 21 MG/DL (ref 7–22)
BUN BLDV-MCNC: 23 MG/DL (ref 7–22)
CALCIUM SERPL-MCNC: 8.4 MG/DL (ref 8.5–10.5)
CALCIUM SERPL-MCNC: 8.7 MG/DL (ref 8.5–10.5)
CALCIUM SERPL-MCNC: 8.7 MG/DL (ref 8.5–10.5)
CHARACTER, URINE: CLEAR
CHLORIDE BLD-SCNC: 100 MEQ/L (ref 98–111)
CHLORIDE BLD-SCNC: 101 MEQ/L (ref 98–111)
CHLORIDE BLD-SCNC: 102 MEQ/L (ref 98–111)
CO2: 26 MEQ/L (ref 23–33)
CO2: 27 MEQ/L (ref 23–33)
CO2: 27 MEQ/L (ref 23–33)
COLOR: YELLOW
CREAT SERPL-MCNC: 0.8 MG/DL (ref 0.4–1.2)
CREAT SERPL-MCNC: 0.9 MG/DL (ref 0.4–1.2)
CREAT SERPL-MCNC: 1 MG/DL (ref 0.4–1.2)
EOSINOPHIL # BLD: 4.2 %
EOSINOPHILS ABSOLUTE: 0.4 THOU/MM3 (ref 0–0.4)
ERYTHROCYTE [DISTWIDTH] IN BLOOD BY AUTOMATED COUNT: 11.8 % (ref 11.5–14.5)
ERYTHROCYTE [DISTWIDTH] IN BLOOD BY AUTOMATED COUNT: 37 FL (ref 35–45)
GFR SERPL CREATININE-BSD FRML MDRD: 81 ML/MIN/1.73M2
GFR SERPL CREATININE-BSD FRML MDRD: > 90 ML/MIN/1.73M2
GFR SERPL CREATININE-BSD FRML MDRD: > 90 ML/MIN/1.73M2
GLUCOSE BLD-MCNC: 102 MG/DL (ref 70–108)
GLUCOSE BLD-MCNC: 107 MG/DL (ref 70–108)
GLUCOSE BLD-MCNC: 114 MG/DL (ref 70–108)
GLUCOSE BLD-MCNC: 118 MG/DL (ref 70–108)
GLUCOSE BLD-MCNC: 132 MG/DL (ref 70–108)
GLUCOSE BLD-MCNC: 132 MG/DL (ref 70–108)
GLUCOSE BLD-MCNC: 149 MG/DL (ref 70–108)
GLUCOSE BLD-MCNC: 219 MG/DL (ref 70–108)
GLUCOSE BLD-MCNC: 236 MG/DL (ref 70–108)
GLUCOSE BLD-MCNC: 253 MG/DL (ref 70–108)
GLUCOSE BLD-MCNC: 312 MG/DL (ref 70–108)
GLUCOSE BLD-MCNC: 330 MG/DL (ref 70–108)
GLUCOSE BLD-MCNC: 336 MG/DL (ref 70–108)
GLUCOSE BLD-MCNC: 345 MG/DL (ref 70–108)
GLUCOSE BLD-MCNC: 361 MG/DL (ref 70–108)
GLUCOSE, URINE: >= 1000 MG/DL
HBA1C MFR BLD: 9.4 % (ref 4.4–6.4)
HCT VFR BLD CALC: 39.1 % (ref 42–52)
HEMOGLOBIN: 14 GM/DL (ref 14–18)
ICTOTEST: NEGATIVE
IMMATURE GRANS (ABS): 0.02 THOU/MM3 (ref 0–0.07)
IMMATURE GRANULOCYTES: 0.2 %
KETONES, URINE: NEGATIVE
LEUKOCYTE EST, POC: NEGATIVE
LYMPHOCYTES # BLD: 33 %
LYMPHOCYTES ABSOLUTE: 2.8 THOU/MM3 (ref 1–4.8)
MAGNESIUM: 1.8 MG/DL (ref 1.6–2.4)
MAGNESIUM: 2 MG/DL (ref 1.6–2.4)
MCH RBC QN AUTO: 30.9 PG (ref 26–33)
MCHC RBC AUTO-ENTMCNC: 35.8 GM/DL (ref 32.2–35.5)
MCV RBC AUTO: 86.3 FL (ref 80–94)
MONOCYTES # BLD: 8.9 %
MONOCYTES ABSOLUTE: 0.8 THOU/MM3 (ref 0.4–1.3)
MRSA SCREEN RT-PCR: NEGATIVE
NITRITE, URINE: NEGATIVE
NUCLEATED RED BLOOD CELLS: 0 /100 WBC
PH UA: 6 (ref 5–9)
PHOSPHORUS: 2.7 MG/DL (ref 2.4–4.7)
PHOSPHORUS: 3 MG/DL (ref 2.4–4.7)
PLATELET # BLD: 168 THOU/MM3 (ref 130–400)
PMV BLD AUTO: 10.8 FL (ref 9.4–12.4)
POTASSIUM SERPL-SCNC: 3.6 MEQ/L (ref 3.5–5.2)
POTASSIUM SERPL-SCNC: 3.9 MEQ/L (ref 3.5–5.2)
POTASSIUM SERPL-SCNC: 4.7 MEQ/L (ref 3.5–5.2)
PROTEIN UA: NEGATIVE MG/DL
RBC # BLD: 4.53 MILL/MM3 (ref 4.7–6.1)
SEG NEUTROPHILS: 53.1 %
SEGMENTED NEUTROPHILS ABSOLUTE COUNT: 4.5 THOU/MM3 (ref 1.8–7.7)
SODIUM BLD-SCNC: 136 MEQ/L (ref 135–145)
SODIUM BLD-SCNC: 137 MEQ/L (ref 135–145)
SODIUM BLD-SCNC: 141 MEQ/L (ref 135–145)
SPECIFIC GRAVITY UA: 1.01 (ref 1–1.03)
TROPONIN T: < 0.01 NG/ML
UROBILINOGEN, URINE: 1 EU/DL (ref 0–1)
VANCOMYCIN RESISTANT ENTEROCOCCUS: NEGATIVE
WBC # BLD: 8.5 THOU/MM3 (ref 4.8–10.8)

## 2019-05-21 PROCEDURE — 6370000000 HC RX 637 (ALT 250 FOR IP): Performed by: INTERNAL MEDICINE

## 2019-05-21 PROCEDURE — 36415 COLL VENOUS BLD VENIPUNCTURE: CPT

## 2019-05-21 PROCEDURE — 87641 MR-STAPH DNA AMP PROBE: CPT

## 2019-05-21 PROCEDURE — 84100 ASSAY OF PHOSPHORUS: CPT

## 2019-05-21 PROCEDURE — 87500 VANOMYCIN DNA AMP PROBE: CPT

## 2019-05-21 PROCEDURE — 2060000000 HC ICU INTERMEDIATE R&B

## 2019-05-21 PROCEDURE — 83735 ASSAY OF MAGNESIUM: CPT

## 2019-05-21 PROCEDURE — 87081 CULTURE SCREEN ONLY: CPT

## 2019-05-21 PROCEDURE — 2580000003 HC RX 258: Performed by: INTERNAL MEDICINE

## 2019-05-21 PROCEDURE — 81003 URINALYSIS AUTO W/O SCOPE: CPT

## 2019-05-21 PROCEDURE — 80048 BASIC METABOLIC PNL TOTAL CA: CPT

## 2019-05-21 PROCEDURE — 84484 ASSAY OF TROPONIN QUANT: CPT

## 2019-05-21 PROCEDURE — 2709999900 HC NON-CHARGEABLE SUPPLY

## 2019-05-21 PROCEDURE — 82948 REAGENT STRIP/BLOOD GLUCOSE: CPT

## 2019-05-21 PROCEDURE — 85025 COMPLETE CBC W/AUTO DIFF WBC: CPT

## 2019-05-21 PROCEDURE — 71045 X-RAY EXAM CHEST 1 VIEW: CPT

## 2019-05-21 PROCEDURE — 99232 SBSQ HOSP IP/OBS MODERATE 35: CPT | Performed by: INTERNAL MEDICINE

## 2019-05-21 PROCEDURE — 80069 RENAL FUNCTION PANEL: CPT

## 2019-05-21 RX ORDER — DEXTROSE MONOHYDRATE 25 G/50ML
12.5 INJECTION, SOLUTION INTRAVENOUS PRN
Status: DISCONTINUED | OUTPATIENT
Start: 2019-05-21 | End: 2019-05-23 | Stop reason: HOSPADM

## 2019-05-21 RX ORDER — GABAPENTIN 600 MG/1
600 TABLET ORAL 3 TIMES DAILY
Status: DISCONTINUED | OUTPATIENT
Start: 2019-05-21 | End: 2019-05-23 | Stop reason: HOSPADM

## 2019-05-21 RX ORDER — GUAIFENESIN 600 MG/1
600 TABLET, EXTENDED RELEASE ORAL 2 TIMES DAILY
Status: DISCONTINUED | OUTPATIENT
Start: 2019-05-21 | End: 2019-05-21

## 2019-05-21 RX ORDER — METOCLOPRAMIDE HYDROCHLORIDE 5 MG/ML
10 INJECTION INTRAMUSCULAR; INTRAVENOUS EVERY 6 HOURS
Status: DISCONTINUED | OUTPATIENT
Start: 2019-05-21 | End: 2019-05-21

## 2019-05-21 RX ORDER — DULOXETIN HYDROCHLORIDE 60 MG/1
60 CAPSULE, DELAYED RELEASE ORAL DAILY
Status: DISCONTINUED | OUTPATIENT
Start: 2019-05-21 | End: 2019-05-23 | Stop reason: HOSPADM

## 2019-05-21 RX ORDER — METOCLOPRAMIDE HYDROCHLORIDE 5 MG/ML
10 INJECTION INTRAMUSCULAR; INTRAVENOUS EVERY 6 HOURS PRN
Status: DISCONTINUED | OUTPATIENT
Start: 2019-05-21 | End: 2019-05-23 | Stop reason: HOSPADM

## 2019-05-21 RX ORDER — POTASSIUM CHLORIDE 7.45 MG/ML
10 INJECTION INTRAVENOUS PRN
Status: DISCONTINUED | OUTPATIENT
Start: 2019-05-21 | End: 2019-05-23 | Stop reason: HOSPADM

## 2019-05-21 RX ORDER — SODIUM CHLORIDE 9 MG/ML
INJECTION, SOLUTION INTRAVENOUS CONTINUOUS
Status: DISCONTINUED | OUTPATIENT
Start: 2019-05-21 | End: 2019-05-21

## 2019-05-21 RX ORDER — DEXTROSE, SODIUM CHLORIDE, AND POTASSIUM CHLORIDE 5; .45; .15 G/100ML; G/100ML; G/100ML
INJECTION INTRAVENOUS CONTINUOUS PRN
Status: DISCONTINUED | OUTPATIENT
Start: 2019-05-21 | End: 2019-05-21

## 2019-05-21 RX ORDER — ACETAMINOPHEN 325 MG/1
650 TABLET ORAL EVERY 4 HOURS PRN
Status: DISCONTINUED | OUTPATIENT
Start: 2019-05-21 | End: 2019-05-23 | Stop reason: HOSPADM

## 2019-05-21 RX ORDER — LEVOFLOXACIN 5 MG/ML
500 INJECTION, SOLUTION INTRAVENOUS EVERY 24 HOURS
Status: DISCONTINUED | OUTPATIENT
Start: 2019-05-21 | End: 2019-05-21

## 2019-05-21 RX ORDER — FLUTICASONE PROPIONATE 50 MCG
1 SPRAY, SUSPENSION (ML) NASAL DAILY
Status: DISCONTINUED | OUTPATIENT
Start: 2019-05-21 | End: 2019-05-23 | Stop reason: HOSPADM

## 2019-05-21 RX ORDER — SIMVASTATIN 20 MG
20 TABLET ORAL NIGHTLY
Status: DISCONTINUED | OUTPATIENT
Start: 2019-05-21 | End: 2019-05-23 | Stop reason: HOSPADM

## 2019-05-21 RX ORDER — LANOLIN ALCOHOL/MO/W.PET/CERES
1000 CREAM (GRAM) TOPICAL DAILY
Status: DISCONTINUED | OUTPATIENT
Start: 2019-05-21 | End: 2019-05-23 | Stop reason: HOSPADM

## 2019-05-21 RX ORDER — DULOXETIN HYDROCHLORIDE 30 MG/1
60 CAPSULE, DELAYED RELEASE ORAL DAILY
COMMUNITY

## 2019-05-21 RX ORDER — NICOTINE 21 MG/24HR
1 PATCH, TRANSDERMAL 24 HOURS TRANSDERMAL DAILY
Status: DISCONTINUED | OUTPATIENT
Start: 2019-05-21 | End: 2019-05-23 | Stop reason: HOSPADM

## 2019-05-21 RX ORDER — DEXTROSE AND SODIUM CHLORIDE 5; .45 G/100ML; G/100ML
INJECTION, SOLUTION INTRAVENOUS CONTINUOUS
Status: DISCONTINUED | OUTPATIENT
Start: 2019-05-21 | End: 2019-05-21

## 2019-05-21 RX ORDER — HYDROXYZINE HYDROCHLORIDE 25 MG/1
25 TABLET, FILM COATED ORAL NIGHTLY
Status: DISCONTINUED | OUTPATIENT
Start: 2019-05-21 | End: 2019-05-23 | Stop reason: HOSPADM

## 2019-05-21 RX ADMIN — CYANOCOBALAMIN TAB 1000 MCG 1000 MCG: 1000 TAB at 15:03

## 2019-05-21 RX ADMIN — SIMVASTATIN 20 MG: 20 TABLET, FILM COATED ORAL at 20:06

## 2019-05-21 RX ADMIN — ACETAMINOPHEN 650 MG: 325 TABLET ORAL at 23:10

## 2019-05-21 RX ADMIN — HYDROXYZINE HYDROCHLORIDE 25 MG: 25 TABLET ORAL at 20:06

## 2019-05-21 RX ADMIN — METOPROLOL TARTRATE 25 MG: 25 TABLET ORAL at 20:06

## 2019-05-21 RX ADMIN — METOPROLOL TARTRATE 25 MG: 25 TABLET ORAL at 15:03

## 2019-05-21 RX ADMIN — DULOXETINE HYDROCHLORIDE 60 MG: 60 CAPSULE, DELAYED RELEASE ORAL at 19:30

## 2019-05-21 RX ADMIN — GABAPENTIN 600 MG: 600 TABLET, FILM COATED ORAL at 20:06

## 2019-05-21 RX ADMIN — DEXTROSE AND SODIUM CHLORIDE: 5; 450 INJECTION, SOLUTION INTRAVENOUS at 04:00

## 2019-05-21 RX ADMIN — GABAPENTIN 600 MG: 600 TABLET, FILM COATED ORAL at 15:03

## 2019-05-21 ASSESSMENT — PAIN SCALES - GENERAL
PAINLEVEL_OUTOF10: 3
PAINLEVEL_OUTOF10: 7
PAINLEVEL_OUTOF10: 0
PAINLEVEL_OUTOF10: 0
PAINLEVEL_OUTOF10: 4

## 2019-05-21 ASSESSMENT — PAIN DESCRIPTION - PROGRESSION: CLINICAL_PROGRESSION: GRADUALLY WORSENING

## 2019-05-21 ASSESSMENT — PAIN - FUNCTIONAL ASSESSMENT: PAIN_FUNCTIONAL_ASSESSMENT: ACTIVITIES ARE NOT PREVENTED

## 2019-05-21 ASSESSMENT — PAIN DESCRIPTION - PAIN TYPE
TYPE: CHRONIC PAIN
TYPE: CHRONIC PAIN

## 2019-05-21 ASSESSMENT — PAIN DESCRIPTION - ORIENTATION: ORIENTATION: MID

## 2019-05-21 ASSESSMENT — PAIN DESCRIPTION - LOCATION
LOCATION: BACK
LOCATION: BACK

## 2019-05-21 ASSESSMENT — PAIN DESCRIPTION - ONSET: ONSET: GRADUAL

## 2019-05-21 ASSESSMENT — PAIN DESCRIPTION - DESCRIPTORS: DESCRIPTORS: ACHING

## 2019-05-21 ASSESSMENT — PAIN DESCRIPTION - FREQUENCY: FREQUENCY: INTERMITTENT

## 2019-05-21 NOTE — ED PROVIDER NOTES
[facility]  eMERGENCY dEPARTMENT eNCOUnter          CHIEF COMPLAINT       Chief Complaint   Patient presents with    Emesis    Generalized Body Aches       Nurses Notes reviewed and I agree except as noted in the HPI. HISTORY OF PRESENT ILLNESS    Marion Shearer is a 40 y.o. male who presents to the Emergency Department for the evaluation of nausea onset 2 days ago. He reports 1 episode of emesis and generalized body aches. Patient has DM Type I and has been in DKA multiple times. Patient is a smoker. The patient denies alcohol or illicit drug use. No further complaints at the time of the initial encounter. The HPI was provided by the patient. REVIEW OF SYSTEMS     Review of Systems   Constitutional: Negative for appetite change, chills, fatigue and fever. HENT: Negative for congestion, ear pain, rhinorrhea and sore throat. Eyes: Negative for discharge, redness and visual disturbance. Respiratory: Negative for cough, shortness of breath and wheezing. Cardiovascular: Negative for chest pain, palpitations and leg swelling. Gastrointestinal: Positive for nausea and vomiting. Negative for abdominal pain and diarrhea. Genitourinary: Negative for decreased urine volume, difficulty urinating, dysuria and hematuria. Musculoskeletal: Positive for arthralgias and myalgias. Negative for back pain, joint swelling and neck pain. Skin: Negative for pallor and rash. Allergic/Immunologic: Negative for environmental allergies. Neurological: Negative for dizziness, syncope, weakness, light-headedness, numbness and headaches. Hematological: Negative for adenopathy. Psychiatric/Behavioral: Negative for confusion and suicidal ideas. The patient is not nervous/anxious. PAST MEDICAL HISTORY    has a past medical history of Diabetes mellitus ([address] SURGICAL HISTORY    has a past surgical history that includes Abdomen surgery and eye surgery.     CURRENT MEDICATIONS are normal.   Neck: Normal range of motion. Neck supple. No JVD present. Cardiovascular: Normal rate, regular rhythm, normal heart sounds, intact distal pulses and normal pulses. Exam reveals no gallop and no friction rub. No murmur heard. Pulmonary/Chest: Effort normal and breath sounds normal. No respiratory distress. He has no decreased breath sounds. He has no wheezes. He has no rhonchi. He has no rales. Abdominal: Soft. Bowel sounds are normal. He exhibits no distension. There is no tenderness. There is no rebound, no guarding and no CVA tenderness. Musculoskeletal: Normal range of motion. He exhibits no edema. Neurological: He is alert and oriented to person, place, and time. He exhibits normal muscle tone. Coordination normal.   Skin: Skin is warm and dry. No rash noted. He is not diaphoretic. Nursing note and vitals reviewed.       DIFFERENTIAL DIAGNOSIS:   DKA, hypoglycemia, hyperglycemia     DIAGNOSTIC RESULTS     EKG: All EKG's are interpreted by the Emergency Department Physician who either signs or Co-signs this chart in the absence of a cardiologist.  EKG interpreted by Puja Norton MD:    None    RADIOLOGY: non-plain film images(s) such as CT, Ultrasound and MRI are read by the radiologist.    No orders to display        LABS:   Labs Reviewed   CBC WITH AUTO DIFFERENTIAL - Abnormal; Notable for the following components:       Result Value    MCHC 35.8 (*)     Platelets 90 (*)     All other components within normal limits   BASIC METABOLIC PANEL - Abnormal; Notable for the following components:    Sodium 134 (*)     Potassium 5.9 (*)     Chloride 93 (*)     Glucose 520 (*)     BUN 31 (*)     All other components within normal limits   BETA-HYDROXYBUTYRATE - Abnormal; Notable for the following components:    Beta-Hydroxybutyrate 33.78 (*)     All other components within normal limits   URINE RT REFLEX TO CULTURE - Abnormal; Notable for the following components:    Glucose, Ur >= 1000 (*)     Bilirubin Urine SMALL (*)     Ketones, Urine >= 80 (*)     All other components within normal limits   ANION GAP - Abnormal; Notable for the following components:    Anion Gap 18.0 (*)     All other components within normal limits   GLOMERULAR FILTRATION RATE, ESTIMATED - Abnormal; Notable for the following components:    Est, Glom Filt Rate 81 (*)     All other components within normal limits   POCT GLUCOSE - Abnormal; Notable for the following components:    POC Glucose 344 (*)     All other components within normal limits   POCT GLUCOSE - Abnormal; Notable for the following components:    POC Glucose 441 (*)     All other components within normal limits   POCT GLUCOSE - Abnormal; Notable for the following components:    POC Glucose 399 (*)     All other components within normal limits   POCT GLUCOSE - Normal   URINE DRUG SCREEN   OSMOLALITY   SCAN OF BLOOD SMEAR   BLOOD GAS, VENOUS   BILE ACIDS, TOTAL   BLOOD GAS, ARTERIAL   HEMOGLOBIN A1C       EMERGENCY DEPARTMENT COURSE:   Vitals:    Vitals:    05/20/19 1908 05/20/19 2013 05/20/19 2125 05/20/19 2223   BP: 110/69 124/81 121/76 (!) 150/82   Pulse: 101 90 89 85   Resp: 18 17 16 17   Temp: 97.5 °F (36.4 °C)      TempSrc: Oral      SpO2: 99% 99% 99% 98%   Weight: 140 lb (63.5 kg)      Height: 5' 10\" (1.778 m)          8:17 PM: The patient was seen and evaluated. MDM:  The patient was seen and evaluated within the department today following nausea. Within the department, I observed the patient's vital signs to be within acceptable range. On exam, I appreciated no acute findings. Laboratory work was completed and discussed with the patient. Glucose was 520. Within the department, the patient was treated with IV fluids and Zofran, insulin and stopped his insulin pump. I explained my proposed course of treatment to the patient, and they were amenable to my decision. The patient was admitted under Dr. Bebo Pete.     CRITICAL CARE:   None     CONSULTS:

## 2019-05-21 NOTE — ED NOTES
PT resting on cot, respires easy and unlabored. Family at bedside. Will monitor.       Vinod Martinez RN  05/20/19 4266

## 2019-05-21 NOTE — H&P
History & Physical        Patient:  Gabriel Renteria  YOB: 1975    MRN: [id number]     Acct: [de-identified]    PCP: Judson Feng MD    Date of Admission: 5/20/2019    Date of Service: Pt seen/examined on 5/21/2019   and Admitted to Inpatient with expected LOS greater than two midnights due to medical therapy. Chief Complaint:   Chief Complaint   Patient presents with    Emesis    Generalized Body Aches       History Of Present Illness:      40 y.o. male who presented to [address] [place] [address] with complaints of emesis and body aches x 2 days. Patient states that he drank water today and vomited. His friend states, she knew immediately he was again in \"DKA\". Patient admits being type 1 diabetic. Patient has insulin pump in place. He states he hasn't been feeling well and has been weathering a cold. He states he has had cough and cold symptoms for the past few days. He denies chest pain or shortness of breath. Past Medical History:          Diagnosis Date    Diabetes mellitus ([facility] [address]        Past Surgical History:          Procedure Laterality Date    ABDOMEN SURGERY      EYE SURGERY         Medications Prior to Admission:      Prior to Admission medications    Medication Sig Start Date End Date Taking? Authorizing Provider   hydrOXYzine (ATARAX) 25 MG tablet Take 25 mg by mouth nightly   Yes Historical Provider, MD   simvastatin (ZOCOR) 20 MG tablet Take 1 tablet by mouth nightly 9/21/18  Yes Pao Gamez MD   metoprolol tartrate (LOPRESSOR) 25 MG tablet Take 1 tablet by mouth 2 times daily 9/21/18  Yes Pao Gamez MD   sertraline (ZOLOFT) 100 MG tablet Take 150 mg by mouth 2 times daily    Yes Historical Provider, MD   Insulin Pump KAI Inject 1 each into the skin See Admin Instructions   Yes Historical Provider, MD   cyanocobalamin 1000 MCG tablet Take 1,000 mcg by mouth daily.    Yes Historical Provider, MD   gabapentin (NEURONTIN) 600 MG tablet Take 600 mg by mouth 3 times if warranted  IS  Troponin trend  EKG in AM  Empiric antibiotics  CXR pending  Mucinex and Flonase for added cold symptom relief  Nicoderm patch  Will need to re-evaluate home medications in morning  Continue to monitor closely             Thank you Judson Feng MD for the opportunity to be involved in this patient's care.     Electronically signed by Luci Barnes DO on 5/21/2019 at 5:28 AM

## 2019-05-21 NOTE — PROGRESS NOTES
Nutrition Assessment    Type and Reason for Visit: Initial(staff request; BMI 19; DM)    Nutrition Recommendations: ONS initiated. Nutrition Assessment:   Pt. nutritionally compromised AEB poor po intake at times d/t gastroparesis. At risk for further nutrition compromise r/t underlying medical condition (DM, gastroparesis). Will send Glucerna TID. Pt. Reports understanding of diabetic diet, CHO counting and gastroparesis. Discussed appropriate use of ONS during times of poor po intake. Malnutrition Assessment:  · Malnutrition Status: At risk for malnutrition  · Context: Chronic illness  · Findings of the 6 clinical characteristics of malnutrition (Minimum of 2 out of 6 clinical characteristics is required to make the diagnosis of moderate or severe Protein Calorie Malnutrition based on AND/ASPEN Guidelines):  1. Energy Intake-Unable to assess(varies), Greater than or equal to 3 months    2. Weight Loss-No significant weight loss,    3. Fat Loss-No significant subcutaneous fat loss,    4. Muscle Loss-No significant muscle mass loss,      Nutrition Risk Level: Moderate    Nutrient Needs:  · Estimated Daily Total Kcal: 1844-3474 kcals (30-35 kcals/kgm wt of 61 kgm)  · Estimated Daily Protein (g): 61-73 gm (1-1.2 gm/kgm wt of 61 kgm)    Nutrition Diagnosis:   · Problem: Inadequate oral intake  · Etiology: related to Insufficient energy/nutrient consumption, Alteration in GI function(gastroparesis)     Signs and symptoms:  as evidenced by Diet history of poor intake(at times)    Objective Information:  · Nutrition-Focused Physical Findings: sleepy;  Rx includes Reglan, Insulin; 5/21: Glucose 132; 5/20: HgbA1c 9.4%, average blood sugar 222 mg/dl  · Wound Type: None  · Current Nutrition Therapies:  · Oral Diet Orders: Carb Control 4 Carbs/Meal   · Oral Diet intake: %  · Oral Nutrition Supplement (ONS) Orders: Diabetic Oral Supplement(TID)  · ONS intake: (initiated)  · Anthropometric Measures:  · Ht: 5' 10\" (177.8 cm)   · Current Body Wt: 135 lb (61.2 kg)(5/21, no edema)  · Admission Body Wt: 135 lb (61.2 kg)(5/21, no edema)  · Usual Body Wt: (per pt 140#; per EMR: 9/18: 128# 11.2 oz, 10/18: 130# 2 oz)  · Ideal Body Wt: 166 lb (75.3 kg),    · BMI Classification: BMI 18.5 - 24.9 Normal Weight    Nutrition Interventions:   Continue current diet, Start ONS  Continued Inpatient Monitoring, Education Initiated, Coordination of Care(5/21 Encouraged compliance with diet. Also discussed use of ONS for improved tolerance with gastroparesis.)    Nutrition Evaluation:   · Evaluation: Goals set   · Goals: Pt. will tolerate and consume 75% or more of meals during LOS.     · Monitoring: Meal Intake, Supplement Intake, Diet Tolerance, Weight, Pertinent Labs, Nausea or Vomiting, Patient/Family Education, Monitor Bowel Function      Electronically signed by Gilmer Wesley RD, LD on 5/21/19 at [address] [place] [address] Number: [phone]

## 2019-05-21 NOTE — ED NOTES
Pt resting on cot, respires easy and unlabored. No needs voiced. Will monitor.       Tami Head, RN  05/20/19 2013

## 2019-05-21 NOTE — ED NOTES
Pt disconnected his self insulin pump prior to starting insulin drip. Dr. Bernabe Decker at bedside.       Kaila Pappas RN  05/20/19 4113

## 2019-05-21 NOTE — PLAN OF CARE
Problem: Nutrition  Goal: Optimal nutrition therapy  Outcome: Ongoing  Nutrition Problem: Inadequate oral intake  Intervention: Food and/or Nutrient Delivery: Continue current diet, Start ONS  Nutritional Goals: Pt. will tolerate and consume 75% or more of meals during LOS.

## 2019-05-21 NOTE — CARE COORDINATION
5/21/19, 11:13 AM      Karla Craft       Admitted from: ED 5/20/2019/ [address] [facility] day: 1   Location: [facility] Reason for admit: Hyperglycemia [R73.9] Status: IP  Admit order signed?: yes  PMH:  has a past medical history of Diabetes mellitus ([address] Procedure: none  Pertinent abnormal Imaging:none  Medications:  Scheduled Meds:   cyanocobalamin  1,000 mcg Oral Daily    gabapentin  600 mg Oral TID    hydrOXYzine  25 mg Oral Nightly    metoprolol tartrate  25 mg Oral BID    sertraline  150 mg Oral BID    simvastatin  20 mg Oral Nightly    enoxaparin  40 mg Subcutaneous Daily    fluticasone  1 spray Each Nare Daily    nicotine  1 patch Transdermal Daily     Continuous Infusions:   Insulin Pump - Insulin regular        Pertinent Info/Orders/Treatment Plan:  Elevated GLUC on admit; monitor, treated with insulin gtt (now discontinued)  Diet: DIET CARB CONTROL; Carb Control: 4 carb choices (60 gms)/meal   Smoking status:  reports that he has been smoking cigarettes. He has been smoking about 0.50 packs per day.  He has never used smokeless tobacco.   PCP: Michael Armstrong MD  Readmission: none  Readmission Risk Score: 16%    Discharge Planning  Current Residence:  Private Residence  Living Arrangements:  Spouse/Significant Other   Support Systems:  Spouse/Significant Other, Parent  Current Services PTA:     Potential Assistance Needed:     Potential Assistance Purchasing Medications:  No  Does patient want to participate in local refill/ meds to beds program?  No  Type of Home Care Services:  None  Patient expects to be discharged to:  Home  Expected Discharge date:  05/23/19  Follow Up Appointment: Best Day/ Time: (Unsure of best time per patient)    Discharge Plan: met with client, denied needs as plans home with significant other, has insulin pump, did not sign [facility] as wants discharged today; continue to monitor     Evaluation: no

## 2019-05-21 NOTE — PROGRESS NOTES
Pt arrived in 4K 28 from ED. Complaints: Hyperglycemia. IV Regular Insulin infusing into the antecubital right, condition patent and no redness at a rate of 6.5 units/ hour with about 60 mls remaining in the bag.     The patient is interested in [facility] program?:  No

## 2019-05-21 NOTE — ED NOTES
Pt is resting on cart with call light in reach. Pt's insulin is adjusted. Pt is updated on POC and pending admission. Will continue to monitor the patient.      Feli Hahn RN  05/21/19 1056

## 2019-05-21 NOTE — PROGRESS NOTES
0930 Patient restarted home insulin dose per pump as ordered per Dr Nadir Johnson. Pt ate breakfast- chem prior to breakfast was 149. Glucostabilizer discontinued. D545 stopped as ordered. 1200 236 Pt gave himself 3 units for Carb Count and 1.7 unit for Correction  And 0.9 basal rate /hr   1700 Chem 330 - Pt gave self 3.6 units for Correction and 4 units for Carb counting and basal rate 0.9 u/hr  Brittney Johnson update with chems today and his insulin coverage per his insulin pump as at home scale. [address] Sangita Tamez returned call and instructed patient to increase his insulin as he would at home for increased blood sugar or he can call his endocrinologist since he does not want us to consult an endocrinologist. Pt stated that he will recheck his chem at 7:30 and adjust his insulin pump.

## 2019-05-21 NOTE — PROGRESS NOTES
Hospitalist Progress Note    Patient:  Yamini Simon      Unit/Bed:4K-28/028-A    YOB: 1975    MRN: [id number]       Acct: [de-identified]     PCP: Luis Rodriguez MD    Date of Admission: 5/20/2019    Assessment/Plan:    Diabetes mellitus type 1 with hyperglycemia, insulin-dependent:  Diagnosed at age 25, no definite evidence of DKA on admission  Hyperglycemia improved, will transition to insulin pump    Hyperkalemia resolved    Gastroparesis and peripheral neuropathy: Continue Neurontin, Reglan when necessary with close monitoring     Essential hypertension on metoprolol    Current smoker counseling done         Expected discharge date: Today or tomorrow    Disposition:    [x] Home       [] TCU       [] Rehab       [] Psych       [] SNF       [] [facility]       [] Other-    Chief Complaint: Nausea and vomiting and elevated blood sugar     Patient presented to the emergency room with chief complaint of nausea and vomiting 3 episodes at least since Friday and not feeling well and prior to that had some upper respiratory symptoms, without any fever or chills or shaking. Patient has been getting his insulin in spite of not eating and the sugars were trending up and came to the emergency room. Patient apparently in the past was taking Reglan for gastroparesis but was stopped because of having tardive dyskinesias. Labs on admission sodium 134 potassium 5.9 BUN 31 creatinine 1 anion gap 18, bicarb 23, blood glucose 520, beta hydroxybutyrate 33, A1c 9.4. Hospital Course: Patient aggressively hydrated and started on insulin drip and his insulin pump was stopped. Blood sugars trended down. No definite of DKA on admission but definitely had elevated blood sugars. Not sure why patient was started on antibiotics, no evidence of infection stopped antibiotics.     Patient's feeling better, will advance diet and resume insulin drip and [facility] [place] with [facility] and does not want our endocrinologist to be consulted. Apparently uses 0.9 units per hour insulin drip and bolus as before meals based on the carb counting.     Subjective (past 24 hours): Denies any more nausea or vomiting, feeling better, wants to eat food      Medications:  Reviewed    Infusion Medications    Insulin Pump - Insulin regular       Scheduled Medications    cyanocobalamin  1,000 mcg Oral Daily    gabapentin  600 mg Oral TID    hydrOXYzine  25 mg Oral Nightly    metoprolol tartrate  25 mg Oral BID    sertraline  150 mg Oral BID    simvastatin  20 mg Oral Nightly    enoxaparin  40 mg Subcutaneous Daily    fluticasone  1 spray Each Nare Daily    nicotine  1 patch Transdermal Daily     PRN Meds: insulin regular, dextrose, potassium chloride, magnesium sulfate, sodium phosphate IVPB **OR** sodium phosphate IVPB **OR** sodium phosphate IVPB, acetaminophen, metoclopramide    No intake or output data in the 24 hours ending 05/21/19 0928    Diet:  DIET CARB CONTROL; Carb Control: 4 carb choices (60 gms)/meal    Exam:  /72   Pulse 75   Temp 98.1 °F (36.7 °C) (Oral)   Resp 16   Ht 5' 10\" (1.778 m)   Wt 135 lb (61.2 kg)   SpO2 98%   BMI 19.37 kg/m²     Physical Examination:   General appearance - alert, awake  and in no distress , thin frame  HEENT: Normocephalic, Atraumatic, pupils reactive, mucous membranes moist  Chest - moving equally to respiration,symmetric air entry, clear to auscultation  Heart - normal rate, regular rhythm, normal S1, S2, no murmurs,   Abdomen - soft, nontender, nondistended, no masses or organomegaly, BS+  Neurological - alert, oriented, normal speech, decreased sensations intact and able to move all extremities   Extremities - peripheral pulses normal, no pedal edema,  Capillary refill less than 3 sec  Skin - normal coloration and turgor, no rashes      Labs:   Recent Labs     05/20/19 2034 05/21/19  0331   WBC 6.9 8.5   HGB 15.2 14.0   HCT

## 2019-05-21 NOTE — PLAN OF CARE
Problem: Falls - Risk of:  Goal: Will remain free from falls  Description  Will remain free from falls  Outcome: Ongoing  Goal: Absence of physical injury  Description  Absence of physical injury  Outcome: Ongoing     Problem: Nutrition  Goal: Optimal nutrition therapy  5/21/2019 1934 by Kimi Omer RN  Outcome: Ongoing  5/21/2019 1351 by Lisa Be RD, LD  Outcome: Ongoing     Problem: Safety:  Goal: Free from accidental physical injury  Description  Free from accidental physical injury  Outcome: Ongoing  Goal: Free from intentional harm  Description  Free from intentional harm  Outcome: Ongoing  Care plan reviewed with patient Patient verbalize understanding of the plan of care and contribute to goal setting.

## 2019-05-22 LAB
ANION GAP SERPL CALCULATED.3IONS-SCNC: 10 MEQ/L (ref 8–16)
BUN BLDV-MCNC: 18 MG/DL (ref 7–22)
CALCIUM SERPL-MCNC: 9.1 MG/DL (ref 8.5–10.5)
CHLORIDE BLD-SCNC: 101 MEQ/L (ref 98–111)
CO2: 25 MEQ/L (ref 23–33)
CREAT SERPL-MCNC: 0.7 MG/DL (ref 0.4–1.2)
EKG ATRIAL RATE: 67 BPM
EKG P AXIS: 80 DEGREES
EKG P-R INTERVAL: 126 MS
EKG Q-T INTERVAL: 398 MS
EKG QRS DURATION: 82 MS
EKG QTC CALCULATION (BAZETT): 420 MS
EKG R AXIS: 88 DEGREES
EKG T AXIS: 82 DEGREES
EKG VENTRICULAR RATE: 67 BPM
ERYTHROCYTE [DISTWIDTH] IN BLOOD BY AUTOMATED COUNT: 11.6 % (ref 11.5–14.5)
ERYTHROCYTE [DISTWIDTH] IN BLOOD BY AUTOMATED COUNT: 35.5 FL (ref 35–45)
GFR SERPL CREATININE-BSD FRML MDRD: > 90 ML/MIN/1.73M2
GLUCOSE BLD-MCNC: 248 MG/DL (ref 70–108)
GLUCOSE BLD-MCNC: 249 MG/DL (ref 70–108)
GLUCOSE BLD-MCNC: 304 MG/DL (ref 70–108)
GLUCOSE BLD-MCNC: 418 MG/DL (ref 70–108)
GLUCOSE BLD-MCNC: 436 MG/DL (ref 70–108)
GLUCOSE BLD-MCNC: 441 MG/DL (ref 70–108)
GLUCOSE BLD-MCNC: 464 MG/DL (ref 70–108)
GLUCOSE BLD-MCNC: 517 MG/DL (ref 70–108)
HCT VFR BLD CALC: 42.5 % (ref 42–52)
HEMOGLOBIN: 15.3 GM/DL (ref 14–18)
MAGNESIUM: 2 MG/DL (ref 1.6–2.4)
MCH RBC QN AUTO: 30.8 PG (ref 26–33)
MCHC RBC AUTO-ENTMCNC: 36 GM/DL (ref 32.2–35.5)
MCV RBC AUTO: 85.5 FL (ref 80–94)
PHOSPHORUS: 3.2 MG/DL (ref 2.4–4.7)
PLATELET # BLD: 165 THOU/MM3 (ref 130–400)
PMV BLD AUTO: 11.4 FL (ref 9.4–12.4)
POTASSIUM SERPL-SCNC: 4.1 MEQ/L (ref 3.5–5.2)
RBC # BLD: 4.97 MILL/MM3 (ref 4.7–6.1)
SODIUM BLD-SCNC: 136 MEQ/L (ref 135–145)
WBC # BLD: 8.2 THOU/MM3 (ref 4.8–10.8)

## 2019-05-22 PROCEDURE — 93010 ELECTROCARDIOGRAM REPORT: CPT | Performed by: INTERNAL MEDICINE

## 2019-05-22 PROCEDURE — 2709999900 HC NON-CHARGEABLE SUPPLY

## 2019-05-22 PROCEDURE — 6370000000 HC RX 637 (ALT 250 FOR IP): Performed by: INTERNAL MEDICINE

## 2019-05-22 PROCEDURE — 82948 REAGENT STRIP/BLOOD GLUCOSE: CPT

## 2019-05-22 PROCEDURE — 84100 ASSAY OF PHOSPHORUS: CPT

## 2019-05-22 PROCEDURE — 36415 COLL VENOUS BLD VENIPUNCTURE: CPT

## 2019-05-22 PROCEDURE — 93005 ELECTROCARDIOGRAM TRACING: CPT | Performed by: INTERNAL MEDICINE

## 2019-05-22 PROCEDURE — 99232 SBSQ HOSP IP/OBS MODERATE 35: CPT | Performed by: FAMILY MEDICINE

## 2019-05-22 PROCEDURE — 80048 BASIC METABOLIC PNL TOTAL CA: CPT

## 2019-05-22 PROCEDURE — 83735 ASSAY OF MAGNESIUM: CPT

## 2019-05-22 PROCEDURE — 85027 COMPLETE CBC AUTOMATED: CPT

## 2019-05-22 PROCEDURE — 2060000000 HC ICU INTERMEDIATE R&B

## 2019-05-22 RX ORDER — DEXTROSE MONOHYDRATE 50 MG/ML
100 INJECTION, SOLUTION INTRAVENOUS PRN
Status: DISCONTINUED | OUTPATIENT
Start: 2019-05-22 | End: 2019-05-23 | Stop reason: HOSPADM

## 2019-05-22 RX ORDER — DEXTROSE MONOHYDRATE 25 G/50ML
12.5 INJECTION, SOLUTION INTRAVENOUS PRN
Status: DISCONTINUED | OUTPATIENT
Start: 2019-05-22 | End: 2019-05-23 | Stop reason: HOSPADM

## 2019-05-22 RX ORDER — NICOTINE POLACRILEX 4 MG
15 LOZENGE BUCCAL PRN
Status: DISCONTINUED | OUTPATIENT
Start: 2019-05-22 | End: 2019-05-23 | Stop reason: HOSPADM

## 2019-05-22 RX ADMIN — CYANOCOBALAMIN TAB 1000 MCG 1000 MCG: 1000 TAB at 09:49

## 2019-05-22 RX ADMIN — GABAPENTIN 600 MG: 600 TABLET, FILM COATED ORAL at 09:49

## 2019-05-22 RX ADMIN — DULOXETINE HYDROCHLORIDE 60 MG: 60 CAPSULE, DELAYED RELEASE ORAL at 09:49

## 2019-05-22 RX ADMIN — HYDROXYZINE HYDROCHLORIDE 25 MG: 25 TABLET ORAL at 21:06

## 2019-05-22 RX ADMIN — SIMVASTATIN 20 MG: 20 TABLET, FILM COATED ORAL at 21:06

## 2019-05-22 RX ADMIN — GABAPENTIN 600 MG: 600 TABLET, FILM COATED ORAL at 14:02

## 2019-05-22 RX ADMIN — GABAPENTIN 600 MG: 600 TABLET, FILM COATED ORAL at 21:06

## 2019-05-22 ASSESSMENT — PAIN SCALES - GENERAL
PAINLEVEL_OUTOF10: 0
PAINLEVEL_OUTOF10: 0
PAINLEVEL_OUTOF10: 3

## 2019-05-22 ASSESSMENT — PAIN DESCRIPTION - PAIN TYPE: TYPE: CHRONIC PAIN

## 2019-05-22 ASSESSMENT — PAIN DESCRIPTION - LOCATION: LOCATION: BACK

## 2019-05-22 NOTE — PLAN OF CARE
Problem: Falls - Risk of:  Goal: Will remain free from falls  Description  Will remain free from falls  5/21/2019 2250 by Tino Darnell RN  Outcome: Ongoing  Note:   No falls this shift. Hourly rounding in effect. Bed alarm on. Problem: Falls - Risk of:  Goal: Absence of physical injury  Description  Absence of physical injury  5/21/2019 2250 by Tino Darnell RN  Outcome: Ongoing  Note:   Patient instructed on use of call light. Demonstrates understanding     Problem: Nutrition  Goal: Optimal nutrition therapy  5/21/2019 2250 by Tino Darnell RN  Outcome: Ongoing  Note:   On carb control diet     Problem: Pain:  Goal: Control of chronic pain  Description  Control of chronic pain  Outcome: Ongoing  Note:   Pain Assessment: 0-10  Pain Level: 3   Pain goal:  No pain   Is pain goal met at this time? No     Additional interventions to be implemented: medications Tylenol and rest     Problem: Serum Glucose Level - Abnormal:  Goal: Ability to maintain appropriate glucose levels will improve  Description  Ability to maintain appropriate glucose levels will improve  Outcome: Ongoing  Note:   Pt off insulin gtt and resumed on home insulin pump. Sugars running 300's, pt refusing to see our Endocrinologists. Hospitalist recommend follow up with pt's Endocrinologist as OP. Care plan reviewed with patient. Patient verbalizes understanding of the plan of care and contributes to goal setting.

## 2019-05-22 NOTE — CARE COORDINATION
5/22/19, 11:58 AM  Client hopeful for discharge today and f/u with PCP when medically cleared (GLUC 200/s); collaborated with Mike Loco RN  Discharge plan discussed by  and . Discharge plan reviewed with patient/ family. Patient/ family verbalize understanding of discharge plan and are in agreement with plan. Understanding was demonstrated using the teach back method.          Update: client not discharged today; he refused to sign [facility] form and threatened AMA if not discharged; collaborated with [place] Loco [place]  Electronically signed by Renuka Mcguire RN on 5/22/2019 at 2:55 PM

## 2019-05-22 NOTE — PROGRESS NOTES
Hospitalist Progress Note    Patient:  Elio Del Rosario      Unit/Bed:4K-28/028-A    YOB: 1975    MRN: [id number]       Acct: [de-identified]     PCP: Kaitlynn Simpson MD    Date of Admission: 5/20/2019    Chief Complaint: Nausea and vomiting and elevated blood sugar       Hospital Course:     Please see H/P for details. In summary, this is a 40 y.o. Male, w DM type 1, on insulin pump, who presented to [facility] on [facility]/19 due to above chief complaint. Patient presented to the emergency room with chief complaint of nausea and vomiting 3 episodes at least since Friday and not feeling well and prior to that had some upper respiratory symptoms, without any fever or chills or shaking. Patient has been getting his insulin in spite of not eating and the sugars were trending up and came to the emergency room. Patient apparently in the past was taking Reglan for gastroparesis but was stopped because of having tardive dyskinesias.     Labs on admission sodium 134 potassium 5.9 BUN 31 creatinine 1 anion gap 18, bicarb 23, blood glucose 520, beta hydroxybutyrate 33, A1c 9.4. Patient is now on insulin pump. Subjective:     Patient seen and examined. Pt denies chest pain, dizziness, sob, abd pain, N/V.         Medications:  Reviewed    Infusion Medications    Insulin Pump - Insulin regular       Scheduled Medications    cyanocobalamin  1,000 mcg Oral Daily    gabapentin  600 mg Oral TID    hydrOXYzine  25 mg Oral Nightly    metoprolol tartrate  25 mg Oral BID    simvastatin  20 mg Oral Nightly    enoxaparin  40 mg Subcutaneous Daily    fluticasone  1 spray Each Nostril Daily    nicotine  1 patch Transdermal Daily    DULoxetine  60 mg Oral Daily     PRN Meds: insulin regular, dextrose, potassium chloride, magnesium sulfate, sodium phosphate IVPB **OR** sodium phosphate IVPB **OR** sodium phosphate IVPB, acetaminophen, metoclopramide      Intake/Output Summary (Last 24 hours) at 5/22/2019 1355  Last No change. **This report has been created using voice recognition software. It may contain minor errors which are inherent in voice recognition technology. **      Final report electronically signed by Dr. Tena Houser on 5/21/2019 9:16 AM            Assessment/Plan: This is a 40 y.o. Male    1. Diabetes mellitus type 1 with hyperglycemia, insulin-dependent    Diagnosed at age 25, no definite evidence of DKA on admission  Hyperglycemia improved, now insulin pump  -sees Endocrinology in OP  -Endocrinology c/s for further recs      2. Hyperkalemia resolved     3. Gastroparesis likely due to DM1    -Reglan when necessary with close monitoring     4. peripheral neuropathy    -Continue Neurontin     5. Essential hypertension, controlled      -cont metoprolol     6. Current smoker     -counseled    7. Low BMI/at risk of malnutrition    -dietician on-board. Appreciate dietician input    8. N/V, possibly due to URI, resolved     9.  Chest discomfort, resolved     -pt is currently asymptomatic   -EKG on arrival unchanged from previous EKG  -Trop T (-)        Anticipated Discharge in : 1-2 days         Diet: DIET CARB CONTROL; Carb Control: 4 carb choices (60 gms)/meal  Dietary Nutrition Supplements: Diabetic Oral Supplement    DVT prophylaxis: [] Lovenox                                 [] SCDs                                 [] SQ Heparin                                 [] Encourage ambulation           [] Already on Anticoagulation     Disposition:    [] Home       [] TCU       [] Rehab       [] Psych       [] SNF       [] [facility]       [x] Other- Plan as above       Code Status: Full Code        Electronically signed by Delmar Perdomo MD on 5/22/2019 at 1:55 PM

## 2019-05-22 NOTE — PROGRESS NOTES
Pt's blood glucose level was 361. Per pt he gave himself 4.2 units of insulin for coverage. 05/21/19 2100   Provider Notification   Reason for Communication Evaluate   Provider Name [facility]   Provider Notification Physician Assistant   Method of Communication Secure Message   Response Waiting for response   Notification Time 2100     [id number] Pt admitted on 5/21 with Hyperglycemia and was placed on an insulin gtt. Since this morning pt has been off of insulin gtt and placed back on home insulin pump. Pt has been dosing himself today and last sugar was 361 and he gave himself 4.2 units of Novolog. Staff has been updating provider in case blood sugar becomes uncontrolled again. Pt refuses to see our Endocrinologist, so Meena Sam recommended he adjust pump himself (as he would do at home) till d/c where he can follow up with his endocrinologist OP. Thanks! 2126:Provider responded back to let her know of any changes.

## 2019-05-23 VITALS
HEIGHT: 70 IN | RESPIRATION RATE: 17 BRPM | DIASTOLIC BLOOD PRESSURE: 65 MMHG | SYSTOLIC BLOOD PRESSURE: 116 MMHG | TEMPERATURE: 97.7 F | HEART RATE: 76 BPM | BODY MASS INDEX: 18.65 KG/M2 | WEIGHT: 130.3 LBS | OXYGEN SATURATION: 97 %

## 2019-05-23 LAB
GLUCOSE BLD-MCNC: 257 MG/DL (ref 70–108)
GLUCOSE BLD-MCNC: 366 MG/DL (ref 70–108)
MRSA SCREEN: NORMAL

## 2019-05-23 PROCEDURE — 6370000000 HC RX 637 (ALT 250 FOR IP): Performed by: INTERNAL MEDICINE

## 2019-05-23 PROCEDURE — 6370000000 HC RX 637 (ALT 250 FOR IP): Performed by: FAMILY MEDICINE

## 2019-05-23 PROCEDURE — 99232 SBSQ HOSP IP/OBS MODERATE 35: CPT | Performed by: FAMILY MEDICINE

## 2019-05-23 PROCEDURE — 99239 HOSP IP/OBS DSCHRG MGMT >30: CPT | Performed by: FAMILY MEDICINE

## 2019-05-23 PROCEDURE — 82948 REAGENT STRIP/BLOOD GLUCOSE: CPT

## 2019-05-23 RX ADMIN — METOPROLOL TARTRATE 25 MG: 25 TABLET ORAL at 08:34

## 2019-05-23 RX ADMIN — CYANOCOBALAMIN TAB 1000 MCG 1000 MCG: 1000 TAB at 08:34

## 2019-05-23 RX ADMIN — GABAPENTIN 600 MG: 600 TABLET, FILM COATED ORAL at 13:24

## 2019-05-23 RX ADMIN — GABAPENTIN 600 MG: 600 TABLET, FILM COATED ORAL at 08:34

## 2019-05-23 RX ADMIN — DULOXETINE HYDROCHLORIDE 60 MG: 60 CAPSULE, DELAYED RELEASE ORAL at 08:34

## 2019-05-23 ASSESSMENT — PAIN SCALES - GENERAL
PAINLEVEL_OUTOF10: 0

## 2019-05-23 NOTE — PROGRESS NOTES
Facility       [x] Other- Plan as above.  RN Lydia Denton spoke w Dr. Michael Mitchell ( Endocrinologist), per Margret Cary, Dr. Michael Mitchell is okay for DC today and that pt needs to use the blood sugar sensor on insulin pump, that will adjust his insulin dose depending on pt's blood sugar, per pt, he has that at home and he agreed for DC today and to f/u w his PCP and Endocrinologist.         Code Status: Full Code      Electronically signed by Luis Chapman MD on 5/23/2019 at 12:31 PM

## 2019-05-23 NOTE — PLAN OF CARE
Problem: Pain Control  Goal: Maintain pain level at or below patient's acceptable level (or 5 if patient is unable to determine acceptable level)  Outcome: Met This Shift  Note:   Pain Assessment: 0-10  Pain Level: 0   Pain goal:  0   Is pain goal met at this time? Yes     Additional interventions to be implemented: none    Patient has denied having any pain tonight. Problem: Nutrition  Goal: Optimal nutrition therapy  Outcome: Ongoing  Note:   Patient on a carb control diet. Problem: Serum Glucose Level - Abnormal:  Goal: Ability to maintain appropriate glucose levels will improve  Description  Ability to maintain appropriate glucose levels will improve  Outcome: Ongoing  Note:   Checking the patient's chem AC&HS. Patient's blood sugar tonight was in the 400's. Patient is giving himself insulin boluses through his insulin pump. Problem: Cardiovascular  Goal: No DVT, peripheral vascular complications  Outcome: Ongoing  Note:   No signs or symptoms of DVT. Patient has been refusing his daily Lovenox injections. Goal: Hemodynamic stability  Outcome: Ongoing  Note:   Vital signs have remained stable and within normal limits. Vitals being monitored every 4 hours and as needed. Continuous cardiac monitor in place. Heart rhythm is NSR. Problem: Respiratory  Goal: No pulmonary complications  Outcome: Ongoing  Note:   Patient on room air with oxygen saturations above 92%. No SOB noted at rest or with ambulation. Lungs are diminished throughout. Goal: O2 Sat > 90%  Outcome: Ongoing  Note:   Patient on room air with oxygen saturations above 92%. Problem: Skin Integrity/Risk  Goal: No skin breakdown during hospitalization  Outcome: Ongoing  Note:   No new areas of skin breakdown noted tonight. Skin is warm and dry. Patient has scattered abrasions. Patient able to turn and reposition independently.       Problem: Musculor/Skeletal Functional Status  Goal: Absence of falls  Outcome: Ongoing  Note:

## 2019-05-23 NOTE — PROGRESS NOTES
CLINICAL PHARMACY: [address] [facility] [place] [facility] Patient?: No  Total # of Interventions Recommended: 0   -   Total # Interventions Accepted: 0  Intervention Severity:   - Level 1 Intervention Present?: No   - Level 2 #: 0   - Level 3 #: 0   Time Spent (min): 5    Additional Documentation:    Nathalie Morgan, PharmD, BCPS   5/23/2019  2:01 PM'

## 2019-05-23 NOTE — DISCHARGE SUMMARY
Hospital Medicine Discharge Summary      Patient Identification:   Edmundo Prescott   : 1975  MRN: [id number]   Account: [de-identified]      Patient's PCP: Josh Aden MD    Admit Date: 2019     Discharge Date:   2019     Admitting Physician: Henderson Sandifer, DO     Discharge Physician: Josephine Sood MD     Discharge Diagnoses:    1. Diabetes mellitus type 1 with hyperglycemia, insulin-dependent   2. Hyperkalemia resolved  3. Gastroparesis likely due to DM1  4. peripheral neuropathy  5. Essential hypertension, controlled    6. Current smoker    7. At risk of malnutrition    8. N/V, possibly due to viral infection, resolved   9. URI, resolved  9. Chest discomfort, resolved          The patient was seen and examined on day of discharge and this discharge summary is in conjunction with any daily progress note from day of discharge. Hospital Course:          Please see H/P for details. In summary, Edmundo Prescott is a 40 y.o. Male, w DM type 1, on insulin pump, who presented to [facility] on 19 due to above chief complaint. Patient presented to the emergency room with chief complaint of nausea and vomiting 3 episodes at least since Friday and not feeling well and prior to that had some upper respiratory symptoms, without any fever or chills or shaking.  Patient has been getting his insulin in spite of not eating and the sugars were trending up and came to the emergency room.  Patient apparently in the past was taking Reglan for gastroparesis but was stopped because of having tardive dyskinesias.     Labs on admission sodium 134 potassium 5.9 BUN 31 creatinine 1 anion gap 18, bicarb 23, blood glucose 520, beta hydroxybutyrate 33, A1c 9.4. Patient is on insulin pump. Blood sugars ranges 200s to 300s. Endocrinology evaluated the patient and okay for discharge on 2019.      Please see below for details of hospital course:    1. Diabetes mellitus type 1 with hyperglycemia, insulin-dependent     Diagnosed at age 25, no definite evidence of DKA on admission  Hyperglycemia improved, now insulin pump  -sees Endocrinology in OP  -Endocrinology evaluated the patient today. RN Venita Mahmood spoke w Dr. Steve Young ( Endocrinologist), per Regina Salas, Dr. Steve Young is okay for DC today and that pt needs to use the blood sugar sensor on insulin pump, that will adjust his insulin dose depending on pt's blood sugar, per pt, he has that at home and he agreed for DC on 5/23/19 and to f/u w his PCP and Endocrinologist.     2. Hyperkalemia resolved     3. Gastroparesis likely due to DM1     -reglan prn       4. peripheral neuropathy     -Continue Neurontin     5. Essential hypertension, controlled       -cont metoprolol     6. Current smoker      -counseled     7. At risk of malnutrition      -dietician on-board. Appreciate dietician input     8. N/V, possibly due to viral infection, resolved     9. URI, resolved      10. Chest discomfort, resolved      -pt is currently asymptomatic   -EKG on arrival unchanged from previous EKG  -Trop T (-)        Patient was discharged home in stable condition on 5/23/2019. Patient was advised to follow-up with his PCP and Endocrinologist.           Exam:     Vitals:  Vitals:    05/23/19 0025 05/23/19 0527 05/23/19 0830 05/23/19 1137   BP: 113/74 (!) 90/57 118/70 116/65   Pulse: 78 79 75 76   Resp: 16 16 18 17   Temp: 97.5 °F (36.4 °C) 97.6 °F (36.4 °C) 97.7 °F (36.5 °C) 97.7 °F (36.5 °C)   TempSrc: Oral Oral Oral Oral   SpO2: 98% 98% 98% 97%   Weight:       Height:         Weight: Weight: 130 lb 4.8 oz (59.1 kg)     24 hour intake/output:    Intake/Output Summary (Last 24 hours) at 5/23/2019 1242  Last data filed at 5/23/2019 0527  Gross per 24 hour   Intake 1390 ml   Output 400 ml   Net 990 ml       General appearance: alert, not in acute distress. HEENT: Pupils equal, round, and reactive to light. Conjunctivae clear. Clear oral mucosa. Neck: Supple, with full range of motion. Respiratory:  Normal respiratory effort. Clear to auscultation, bilaterally without Rales/Wheezes/Rhonchi. Cardiovascular: normal rate, regular rhythm with normal S1/S2 without murmurs, rubs or gallops. Abdomen: Soft, non-tender, non-distended with normal bowel sounds. Musculoskeletal: passive and active ROM x 4 extremities. Exam of extremities: no pedal or leg edema, no clubbing or cyanosis        Labs: For convenience and continuity at follow-up the following most recent labs are provided:      CBC:    Lab Results   Component Value Date    WBC 8.2 05/22/2019    HGB 15.3 05/22/2019    HCT 42.5 05/22/2019     05/22/2019       Renal:    Lab Results   Component Value Date     05/22/2019    K 4.1 05/22/2019    K 4.3 10/24/2018     05/22/2019    CO2 25 05/22/2019    BUN 18 05/22/2019    CREATININE 0.7 05/22/2019    CALCIUM 9.1 05/22/2019    PHOS 3.2 05/22/2019         Significant Diagnostic Studies    Radiology:   XR CHEST PORTABLE   Final Result   No acute cardiopulmonary disease. No change. **This report has been created using voice recognition software. It may contain minor errors which are inherent in voice recognition technology. **      Final report electronically signed by Dr. Mitzi Maharaj on 5/21/2019 9:16 AM             Consults:     IP CONSULT TO ENDOCRINOLOGY    Disposition:    [x] Home       [] TCU       [] Rehab       [] Psych       [] SNF       [] [facility]       [] Other-    Condition at Discharge: Stable    Code Status:  Full Code     Patient Instructions:    Discharge lab work: none   Activity: activity as tolerated  Diet: DIET CARB CONTROL; Carb Control: 4 carb choices (60 gms)/meal  Dietary Nutrition Supplements: Diabetic Oral Supplement      Follow-up visits:   AMADOR Maxwell [address] [place] [id number] [phone]      staff-please s/u w/i 7d         Discharge Medications:      Luna Arevalo   Home Medication Instructions

## 2019-05-26 ENCOUNTER — HOSPITAL ENCOUNTER (INPATIENT)
Age: 44
LOS: 2 days | Discharge: HOME | DRG: 639 | End: 2019-05-28
Attending: FAMILY MEDICINE | Admitting: FAMILY MEDICINE
Payer: OTHER GOVERNMENT

## 2019-05-26 ENCOUNTER — APPOINTMENT (OUTPATIENT)
Dept: GENERAL RADIOLOGY | Age: 44
DRG: 639 | End: 2019-05-26
Payer: OTHER GOVERNMENT

## 2019-05-26 DIAGNOSIS — E10.10 TYPE 1 DIABETES MELLITUS WITH KETOACIDOSIS WITHOUT COMA (HCC): Primary | ICD-10-CM

## 2019-05-26 PROBLEM — E86.0 DEHYDRATION: Status: ACTIVE | Noted: 2019-05-26

## 2019-05-26 PROBLEM — E87.20 METABOLIC ACIDOSIS: Status: ACTIVE | Noted: 2019-05-26

## 2019-05-26 PROBLEM — D64.9 ANEMIA: Status: ACTIVE | Noted: 2019-05-26

## 2019-05-26 LAB
ALBUMIN SERPL-MCNC: 4.1 G/DL (ref 3.5–5.1)
ALLEN TEST: NORMAL
ALP BLD-CCNC: 75 U/L (ref 38–126)
ALT SERPL-CCNC: 17 U/L (ref 11–66)
ANION GAP SERPL CALCULATED.3IONS-SCNC: 25 MEQ/L (ref 8–16)
APTT: 25.7 SECONDS (ref 22–38)
AST SERPL-CCNC: 17 U/L (ref 5–40)
BASE EXCESS (CALCULATED): -0.3 MMOL/L (ref -2.5–2.5)
BASE EXCESS MIXED: -7.1 MMOL/L (ref -2–3)
BASOPHILS # BLD: 0.8 %
BASOPHILS ABSOLUTE: 0 THOU/MM3 (ref 0–0.1)
BETA-HYDROXYBUTYRATE: 81.18 MG/DL (ref 0.2–2.81)
BILIRUB SERPL-MCNC: 0.6 MG/DL (ref 0.3–1.2)
BUN BLDV-MCNC: 27 MG/DL (ref 7–22)
C-REACTIVE PROTEIN: 0.59 MG/DL (ref 0–1)
CALCIUM SERPL-MCNC: 8.9 MG/DL (ref 8.5–10.5)
CHLORIDE BLD-SCNC: 88 MEQ/L (ref 98–111)
CO2: 19 MEQ/L (ref 23–33)
COLLECTED BY:: ABNORMAL
COLLECTED BY:: NORMAL
CREAT SERPL-MCNC: 1.2 MG/DL (ref 0.4–1.2)
DEVICE: NORMAL
EOSINOPHIL # BLD: 2.5 %
EOSINOPHILS ABSOLUTE: 0.1 THOU/MM3 (ref 0–0.4)
ERYTHROCYTE [DISTWIDTH] IN BLOOD BY AUTOMATED COUNT: 11.8 % (ref 11.5–14.5)
ERYTHROCYTE [DISTWIDTH] IN BLOOD BY AUTOMATED COUNT: 37.1 FL (ref 35–45)
GFR SERPL CREATININE-BSD FRML MDRD: 66 ML/MIN/1.73M2
GLUCOSE BLD-MCNC: 215 MG/DL (ref 70–108)
GLUCOSE BLD-MCNC: 233 MG/DL (ref 70–108)
GLUCOSE BLD-MCNC: 273 MG/DL (ref 70–108)
GLUCOSE BLD-MCNC: 399 MG/DL (ref 70–108)
GLUCOSE BLD-MCNC: 420 MG/DL (ref 70–108)
GLUCOSE BLD-MCNC: 423 MG/DL (ref 70–108)
HCO3, MIXED: 20 MMOL/L (ref 23–28)
HCO3: 25 MMOL/L (ref 23–28)
HCT VFR BLD CALC: 33.5 % (ref 42–52)
HEMOGLOBIN: 11.7 GM/DL (ref 14–18)
IMMATURE GRANS (ABS): 0.01 THOU/MM3 (ref 0–0.07)
IMMATURE GRANULOCYTES: 0.2 %
INR BLD: 0.92 (ref 0.85–1.13)
LIPASE: 10.2 U/L (ref 5.6–51.3)
LYMPHOCYTES # BLD: 16.9 %
LYMPHOCYTES ABSOLUTE: 0.9 THOU/MM3 (ref 1–4.8)
MCH RBC QN AUTO: 30.3 PG (ref 26–33)
MCHC RBC AUTO-ENTMCNC: 34.9 GM/DL (ref 32.2–35.5)
MCV RBC AUTO: 86.8 FL (ref 80–94)
MONOCYTES # BLD: 5 %
MONOCYTES ABSOLUTE: 0.3 THOU/MM3 (ref 0.4–1.3)
MRSA SCREEN RT-PCR: NEGATIVE
NUCLEATED RED BLOOD CELLS: 0 /100 WBC
O2 SAT, MIXED: 62 %
O2 SATURATION: 96 %
OSMOLALITY CALCULATION: 287.7 MOSMOL/KG (ref 275–300)
PCO2, MIXED VENOUS: 44 MMHG (ref 41–51)
PCO2: 42 MMHG (ref 35–45)
PH BLOOD GAS: 7.38 (ref 7.35–7.45)
PH, MIXED: 7.26 (ref 7.31–7.41)
PLATELET # BLD: 135 THOU/MM3 (ref 130–400)
PMV BLD AUTO: 11.3 FL (ref 9.4–12.4)
PO2 MIXED: 37 MMHG (ref 25–40)
PO2: 86 MMHG (ref 71–104)
POTASSIUM REFLEX MAGNESIUM: 4.9 MEQ/L (ref 3.5–5.2)
RBC # BLD: 3.86 MILL/MM3 (ref 4.7–6.1)
SEG NEUTROPHILS: 74.6 %
SEGMENTED NEUTROPHILS ABSOLUTE COUNT: 3.9 THOU/MM3 (ref 1.8–7.7)
SODIUM BLD-SCNC: 132 MEQ/L (ref 135–145)
SOURCE, BLOOD GAS: NORMAL
TOTAL PROTEIN: 6.8 G/DL (ref 6.1–8)
TROPONIN T: < 0.01 NG/ML
VANCOMYCIN RESISTANT ENTEROCOCCUS: NEGATIVE
WBC # BLD: 5.2 THOU/MM3 (ref 4.8–10.8)

## 2019-05-26 PROCEDURE — 85025 COMPLETE CBC W/AUTO DIFF WBC: CPT

## 2019-05-26 PROCEDURE — 82948 REAGENT STRIP/BLOOD GLUCOSE: CPT

## 2019-05-26 PROCEDURE — 85730 THROMBOPLASTIN TIME PARTIAL: CPT

## 2019-05-26 PROCEDURE — 99285 EMERGENCY DEPT VISIT HI MDM: CPT

## 2019-05-26 PROCEDURE — 6370000000 HC RX 637 (ALT 250 FOR IP): Performed by: FAMILY MEDICINE

## 2019-05-26 PROCEDURE — 83690 ASSAY OF LIPASE: CPT

## 2019-05-26 PROCEDURE — 82010 KETONE BODYS QUAN: CPT

## 2019-05-26 PROCEDURE — 82803 BLOOD GASES ANY COMBINATION: CPT

## 2019-05-26 PROCEDURE — 84484 ASSAY OF TROPONIN QUANT: CPT

## 2019-05-26 PROCEDURE — 85610 PROTHROMBIN TIME: CPT

## 2019-05-26 PROCEDURE — 99223 1ST HOSP IP/OBS HIGH 75: CPT | Performed by: INTERNAL MEDICINE

## 2019-05-26 PROCEDURE — 2580000003 HC RX 258: Performed by: FAMILY MEDICINE

## 2019-05-26 PROCEDURE — 86140 C-REACTIVE PROTEIN: CPT

## 2019-05-26 PROCEDURE — 71045 X-RAY EXAM CHEST 1 VIEW: CPT

## 2019-05-26 PROCEDURE — 2709999900 HC NON-CHARGEABLE SUPPLY

## 2019-05-26 PROCEDURE — 6370000000 HC RX 637 (ALT 250 FOR IP): Performed by: INTERNAL MEDICINE

## 2019-05-26 PROCEDURE — 93005 ELECTROCARDIOGRAM TRACING: CPT | Performed by: FAMILY MEDICINE

## 2019-05-26 PROCEDURE — 2580000003 HC RX 258: Performed by: INTERNAL MEDICINE

## 2019-05-26 PROCEDURE — 2060000000 HC ICU INTERMEDIATE R&B

## 2019-05-26 PROCEDURE — 87081 CULTURE SCREEN ONLY: CPT

## 2019-05-26 PROCEDURE — 36415 COLL VENOUS BLD VENIPUNCTURE: CPT

## 2019-05-26 PROCEDURE — 87500 VANOMYCIN DNA AMP PROBE: CPT

## 2019-05-26 PROCEDURE — 36600 WITHDRAWAL OF ARTERIAL BLOOD: CPT

## 2019-05-26 PROCEDURE — 96365 THER/PROPH/DIAG IV INF INIT: CPT

## 2019-05-26 PROCEDURE — 87641 MR-STAPH DNA AMP PROBE: CPT

## 2019-05-26 PROCEDURE — 94760 N-INVAS EAR/PLS OXIMETRY 1: CPT

## 2019-05-26 PROCEDURE — 80053 COMPREHEN METABOLIC PANEL: CPT

## 2019-05-26 RX ORDER — 0.9 % SODIUM CHLORIDE 0.9 %
1000 INTRAVENOUS SOLUTION INTRAVENOUS ONCE
Status: COMPLETED | OUTPATIENT
Start: 2019-05-26 | End: 2019-05-26

## 2019-05-26 RX ORDER — 0.9 % SODIUM CHLORIDE 0.9 %
15 INTRAVENOUS SOLUTION INTRAVENOUS ONCE
Status: COMPLETED | OUTPATIENT
Start: 2019-05-26 | End: 2019-05-27

## 2019-05-26 RX ORDER — SODIUM CHLORIDE 450 MG/100ML
INJECTION, SOLUTION INTRAVENOUS CONTINUOUS
Status: DISCONTINUED | OUTPATIENT
Start: 2019-05-26 | End: 2019-05-27 | Stop reason: ALTCHOICE

## 2019-05-26 RX ORDER — DEXTROSE AND SODIUM CHLORIDE 5; .45 G/100ML; G/100ML
INJECTION, SOLUTION INTRAVENOUS CONTINUOUS PRN
Status: DISCONTINUED | OUTPATIENT
Start: 2019-05-26 | End: 2019-05-27 | Stop reason: ALTCHOICE

## 2019-05-26 RX ORDER — LANOLIN ALCOHOL/MO/W.PET/CERES
1000 CREAM (GRAM) TOPICAL DAILY
Status: DISCONTINUED | OUTPATIENT
Start: 2019-05-27 | End: 2019-05-28 | Stop reason: HOSPADM

## 2019-05-26 RX ORDER — DULOXETIN HYDROCHLORIDE 60 MG/1
60 CAPSULE, DELAYED RELEASE ORAL DAILY
Status: DISCONTINUED | OUTPATIENT
Start: 2019-05-27 | End: 2019-05-28 | Stop reason: HOSPADM

## 2019-05-26 RX ORDER — 0.9 % SODIUM CHLORIDE 0.9 %
250 INTRAVENOUS SOLUTION INTRAVENOUS ONCE
Status: COMPLETED | OUTPATIENT
Start: 2019-05-26 | End: 2019-05-27

## 2019-05-26 RX ORDER — SIMVASTATIN 20 MG
20 TABLET ORAL NIGHTLY
Status: DISCONTINUED | OUTPATIENT
Start: 2019-05-26 | End: 2019-05-28 | Stop reason: HOSPADM

## 2019-05-26 RX ORDER — HYDROXYZINE HYDROCHLORIDE 25 MG/1
25 TABLET, FILM COATED ORAL NIGHTLY
Status: DISCONTINUED | OUTPATIENT
Start: 2019-05-26 | End: 2019-05-28 | Stop reason: HOSPADM

## 2019-05-26 RX ORDER — GABAPENTIN 600 MG/1
600 TABLET ORAL 3 TIMES DAILY
Status: DISCONTINUED | OUTPATIENT
Start: 2019-05-26 | End: 2019-05-28 | Stop reason: HOSPADM

## 2019-05-26 RX ORDER — POTASSIUM CHLORIDE 7.45 MG/ML
10 INJECTION INTRAVENOUS PRN
Status: DISCONTINUED | OUTPATIENT
Start: 2019-05-26 | End: 2019-05-28 | Stop reason: HOSPADM

## 2019-05-26 RX ORDER — DEXTROSE MONOHYDRATE 25 G/50ML
12.5 INJECTION, SOLUTION INTRAVENOUS PRN
Status: DISCONTINUED | OUTPATIENT
Start: 2019-05-26 | End: 2019-05-27 | Stop reason: SDUPTHER

## 2019-05-26 RX ORDER — ACETAMINOPHEN 325 MG/1
650 TABLET ORAL EVERY 4 HOURS PRN
Status: DISCONTINUED | OUTPATIENT
Start: 2019-05-26 | End: 2019-05-28 | Stop reason: HOSPADM

## 2019-05-26 RX ADMIN — SODIUM CHLORIDE 1000 ML: 9 INJECTION, SOLUTION INTRAVENOUS at 18:20

## 2019-05-26 RX ADMIN — DEXTROSE AND SODIUM CHLORIDE: 5; 450 INJECTION, SOLUTION INTRAVENOUS at 23:34

## 2019-05-26 RX ADMIN — HYDROXYZINE HYDROCHLORIDE 25 MG: 25 TABLET ORAL at 23:38

## 2019-05-26 RX ADMIN — GABAPENTIN 600 MG: 600 TABLET, FILM COATED ORAL at 23:38

## 2019-05-26 RX ADMIN — SODIUM CHLORIDE 250 ML: 9 INJECTION, SOLUTION INTRAVENOUS at 20:30

## 2019-05-26 RX ADMIN — SODIUM CHLORIDE 921 ML: 9 INJECTION, SOLUTION INTRAVENOUS at 23:40

## 2019-05-26 RX ADMIN — SODIUM CHLORIDE 1000 ML: 9 INJECTION, SOLUTION INTRAVENOUS at 19:29

## 2019-05-26 RX ADMIN — Medication 7.2 UNITS/HR: at 18:42

## 2019-05-26 RX ADMIN — SIMVASTATIN 20 MG: 20 TABLET, FILM COATED ORAL at 23:38

## 2019-05-26 ASSESSMENT — PAIN DESCRIPTION - ORIENTATION: ORIENTATION: MID;LOWER

## 2019-05-26 ASSESSMENT — PAIN DESCRIPTION - PROGRESSION: CLINICAL_PROGRESSION: NOT CHANGED

## 2019-05-26 ASSESSMENT — ENCOUNTER SYMPTOMS
SHORTNESS OF BREATH: 0
EYE DISCHARGE: 0
SORE THROAT: 0
EYE REDNESS: 0
ABDOMINAL PAIN: 0
BACK PAIN: 0
VOMITING: 0
RHINORRHEA: 0
DIARRHEA: 0
WHEEZING: 0
NAUSEA: 0
COUGH: 0

## 2019-05-26 ASSESSMENT — PAIN DESCRIPTION - ONSET: ONSET: ON-GOING

## 2019-05-26 ASSESSMENT — PAIN DESCRIPTION - LOCATION
LOCATION: BACK
LOCATION: BACK

## 2019-05-26 ASSESSMENT — PAIN SCALES - GENERAL
PAINLEVEL_OUTOF10: 2
PAINLEVEL_OUTOF10: 5

## 2019-05-26 ASSESSMENT — PAIN DESCRIPTION - PAIN TYPE
TYPE: CHRONIC PAIN
TYPE: CHRONIC PAIN

## 2019-05-26 ASSESSMENT — PAIN - FUNCTIONAL ASSESSMENT: PAIN_FUNCTIONAL_ASSESSMENT: ACTIVITIES ARE NOT PREVENTED

## 2019-05-26 ASSESSMENT — PAIN DESCRIPTION - DESCRIPTORS: DESCRIPTORS: ACHING;NAGGING

## 2019-05-26 ASSESSMENT — PAIN DESCRIPTION - FREQUENCY: FREQUENCY: CONTINUOUS

## 2019-05-26 NOTE — ED NOTES
Pt accidentally removed his quick-set infusion insulin pump while taking his shirt off. Distribution paged to find replacement.        Ras Hughes RN  05/26/19 5906

## 2019-05-26 NOTE — ED NOTES
Pt continues to feel fatigued. Fruity, ammonia odor now coming from pt, noticed by this nurse. Pt updated on plan of care. Pt resting in bed, eyes closed, responds appropriately.       aPtrica Rodriguez RN  05/26/19 8065

## 2019-05-26 NOTE — ED NOTES
Pt reports feeling cold. Warm blanket provided. Call light in reach. Pt updated on plan for admission.       Denita Stephens RN  05/26/19 1287

## 2019-05-26 NOTE — LETTER
[facility] [address]  [facility] [address]  Phone: [phone]             May 28, 2019    Patient: Estrella Polk   YOB: 1975   Date of Visit: 5/26/2019       To Whom It May Concern:    Toney Arzate was seen and treated in our facility  beginning 5/26/2019 until 5/28/2019. He may return to work on 5/28/2019.       Sincerely,       Ciara Reilly RN         Signature:__________________________________

## 2019-05-26 NOTE — ED PROVIDER NOTES
hematuria. Musculoskeletal: Negative for arthralgias, back pain, joint swelling and neck pain. Skin: Negative for pallor and rash. Allergic/Immunologic: Negative for environmental allergies. Neurological: Positive for weakness (generalized). Negative for dizziness, syncope, light-headedness, numbness and headaches. Hematological: Negative for adenopathy. Psychiatric/Behavioral: Negative for confusion and suicidal ideas. The patient is not nervous/anxious. PAST MEDICAL HISTORY    has a past medical history of Diabetes mellitus ([facility] [address] SURGICAL HISTORY    has a past surgical history that includes Abdomen surgery and eye surgery. CURRENT MEDICATIONS       Previous Medications    CYANOCOBALAMIN 1000 MCG TABLET    Take 1,000 mcg by mouth daily. DULOXETINE (CYMBALTA) 30 MG EXTENDED RELEASE CAPSULE    Take 60 mg by mouth daily    GABAPENTIN (NEURONTIN) 600 MG TABLET    Take 600 mg by mouth 3 times daily. HYDROXYZINE (ATARAX) 25 MG TABLET    Take 25 mg by mouth nightly    INSULIN PUMP KAI    Inject 1 each into the skin See Admin Instructions    METOPROLOL TARTRATE (LOPRESSOR) 25 MG TABLET    Take 1 tablet by mouth 2 times daily    SIMVASTATIN (ZOCOR) 20 MG TABLET    Take 1 tablet by mouth nightly       ALLERGIES     has No Known Allergies. FAMILY HISTORY     indicated that his mother is alive. He indicated that the status of his maternal grandfather is unknown. He indicated that the status of his neg hx is unknown.   family history includes Colon Cancer in his maternal grandfather. SOCIAL HISTORY      reports that he has been smoking cigarettes. He has been smoking about 0.50 packs per day. He has never used smokeless tobacco. He reports that he has current or past drug history. Drugs: Marijuana and Opiates . He reports that he does not drink alcohol. PHYSICAL EXAM     INITIAL VITALS:  height is 5' 10\" (1.778 m) and weight is 130 lb (59 kg). His oral temperature is 97.7 °F (36.5 °C). His blood pressure is 113/74 and his pulse is 76. His respiration is 12 and oxygen saturation is 99%. Physical Exam   Constitutional: He is oriented to person, place, and time. He appears well-developed and well-nourished. Non-toxic appearance. HENT:   Head: Normocephalic and atraumatic. Right Ear: Tympanic membrane and external ear normal.   Left Ear: Tympanic membrane and external ear normal.   Nose: Nose normal.   Mouth/Throat: Oropharynx is clear and moist and mucous membranes are normal. No oropharyngeal exudate, posterior oropharyngeal edema or posterior oropharyngeal erythema. Eyes: Conjunctivae and EOM are normal.   Neck: Normal range of motion. Neck supple. No JVD present. Cardiovascular: Normal rate, regular rhythm, normal heart sounds, intact distal pulses and normal pulses. Exam reveals no gallop and no friction rub. No murmur heard. Pulmonary/Chest: Effort normal and breath sounds normal. No respiratory distress. He has no decreased breath sounds. He has no wheezes. He has no rhonchi. He has no rales. Abdominal: Soft. Bowel sounds are normal. He exhibits no distension. There is no tenderness. There is no rebound, no guarding and no CVA tenderness. Musculoskeletal: Normal range of motion. He exhibits no edema. Neurological: He is alert and oriented to person, place, and time. He exhibits normal muscle tone. Coordination normal.   Skin: Skin is warm and dry. No rash noted. He is not diaphoretic. Nursing note and vitals reviewed. DIFFERENTIAL DIAGNOSIS:   Hyperglycemia, DKA    DIAGNOSTIC RESULTS     EKG: All EKG's are interpreted by the Emergency Department Physician who either signs or Co-signs this chart in the absence of a cardiologist.  EKG interpreted by Shraif Krishna MD:    None     RADIOLOGY: non-plain film images(s) such as CT, Ultrasound and MRI are read by the radiologist.    XR CHEST PORTABLE   Final Result   There is no acute intrathoracic process. **This report has been created using voice recognition software. It may contain minor errors which are inherent in voice recognition technology. **      Final report electronically signed by Dr Jhonathan Slaughter on 5/26/2019 6:33 PM           LABS:   Labs Reviewed   CBC WITH AUTO DIFFERENTIAL - Abnormal; Notable for the following components:       Result Value    RBC 3.86 (*)     Hemoglobin 11.7 (*)     Hematocrit 33.5 (*)     Lymphocytes # 0.9 (*)     Monocytes # 0.3 (*)     All other components within normal limits   COMPREHENSIVE METABOLIC PANEL W/ REFLEX TO MG FOR LOW K - Abnormal; Notable for the following components:    Glucose 423 (*)     BUN 27 (*)     Sodium 132 (*)     Chloride 88 (*)     CO2 19 (*)     All other components within normal limits   BETA-HYDROXYBUTYRATE - Abnormal; Notable for the following components:    Beta-Hydroxybutyrate 81.18 (*)     All other components within normal limits   BLOOD GAS, VENOUS - Abnormal; Notable for the following components:    PH MIXED 7.26 (*)     HCO3, Mixed 20 (*)     Base Exc, Mixed -7.1 (*)     All other components within normal limits   ANION GAP - Abnormal; Notable for the following components:    Anion Gap 25.0 (*)     All other components within normal limits   GLOMERULAR FILTRATION RATE, ESTIMATED - Abnormal; Notable for the following components:    Est, Glom Filt Rate 66 (*)     All other components within normal limits   POCT GLUCOSE - Abnormal; Notable for the following components:    POC Glucose 420 (*)     All other components within normal limits   POCT GLUCOSE - Abnormal; Notable for the following components:    POC Glucose 399 (*)     All other components within normal limits   LIPASE   TROPONIN   PROTIME-INR   APTT   C-REACTIVE PROTEIN   OSMOLALITY   URINE RT REFLEX TO CULTURE       EMERGENCY DEPARTMENT COURSE:   Vitals:    Vitals:    05/26/19 1743 05/26/19 1849 05/26/19 1934   BP: 114/68 107/66 113/74   Pulse: 72 72 76   Resp: 14 14 12   Temp: 97.7 °F (36.5 °C)     TempSrc: Oral     SpO2: 99% 99% 99%   Weight: 130 lb (59 kg)     Height: 5' 10\" (1.778 m)         6:04 PM: The patient was seen and evaluated. MDM:    Patient presented to the emergency department with nonspecific symptoms. He is concerned being weakness and fatigue. His blood glucose was 420 at arrival.  She.319 9 at the moment. His venous pH is 7.26 and bicarb is down to 20. His CBC is satisfactory. Creatinine is 1.2 BUN of 27 sodium 132. Michelle Bloomery His beta hydroxybutyrate is 81. 18. Chest x-rays normal.  Patient may be admitted for DKA for further evaluation and treatment. He is started on fluids and insulin drip here in the ED. CONSULTS:    730pm Patient is discussed with the hospitalist service Dr Hillary Hong kindly accepting the patient. FINAL IMPRESSION      1. Type 1 diabetes mellitus with ketoacidosis without coma [facility])          DISPOSITION/PLAN     Patient is admitted. PATIENT REFERRED TO:  No follow-up provider specified. DISCHARGE MEDICATIONS:  New Prescriptions    No medications on file       (Please note that portions of this note were completed with a voice recognition program.  Efforts were made to edit the dictations but occasionally words are mis-transcribed.)    Scribe:  Brien Candelario 5/26/19 6:04 PM Scribing for and in the presence of Yi Esquivel MD.    Signed by: Angely Sanchez, 05/26/19 7:41 PM    Provider:  I personally performed the services described in the documentation, reviewed and edited the documentation which was dictated to the scribe in my presence, and it accurately records my words and actions.     Yi Esquivel MD 5/26/19 7:41 PM       Yi Esquivel MD  05/26/19 6234

## 2019-05-26 NOTE — H&P
History & Physical        Patient:  Ilda Hartman  YOB: 1975    MRN: [id number]     Acct: [de-identified]    PCP: Saunders Aschoff, MD    Date of Admission: 5/26/2019    Date of Service: Pt seen/examined on 5/26/2019   and Admitted to Inpatient with expected LOS greater than two midnights due to medical therapy. Chief Complaint:   Chief Complaint   Patient presents with    Fatigue     discharged Thursday with hyperglycemia       History Of Present Illness:      40 y.o. male who presented to [facility] with complaints of elevated glucose. Patient states that he has elevated glucose. He admits being a type 1 diabetic and being hospitalized last week for similar complaints. Patient states that he was discharged last week with uncontrolled blood sugars. Patient reports having an insulin pump, but is nonfunctioning at this time. Patient reports feeling sick recently. Nausea with emesis. He reports increased stress. Admits to some chest discomfort and shortness of breath earlier, but is now resolved. PMH HTN, gastroparesis. He is  Tobacco user. Patient to be admitted for hyperglycemia. Past Medical History:          Diagnosis Date    Diabetes mellitus ([facility] [address]        Past Surgical History:          Procedure Laterality Date    ABDOMEN SURGERY      EYE SURGERY         Medications Prior to Admission:      Prior to Admission medications    Medication Sig Start Date End Date Taking?  Authorizing Provider   DULoxetine (CYMBALTA) 30 MG extended release capsule Take 60 mg by mouth daily   Yes Historical Provider, MD   hydrOXYzine (ATARAX) 25 MG tablet Take 25 mg by mouth nightly   Yes Historical Provider, MD   simvastatin (ZOCOR) 20 MG tablet Take 1 tablet by mouth nightly 9/21/18  Yes Aisha Perez MD   metoprolol tartrate (LOPRESSOR) 25 MG tablet Take 1 tablet by mouth 2 times daily 9/21/18  Yes Aisha Perez MD   Insulin Pump KAI Inject 1 each into the skin See Admin Instructions   Yes Historical Provider, MD   cyanocobalamin 1000 MCG tablet Take 1,000 mcg by mouth daily. Yes Historical Provider, MD   gabapentin (NEURONTIN) 600 MG tablet Take 600 mg by mouth 3 times daily. Yes Historical Provider, MD       Allergies:  Patient has no known allergies. Social History:      The patient currently lives at home    TOBACCO:   reports that he has been smoking cigarettes. He has been smoking about 0.50 packs per day. He has never used smokeless tobacco.  ETOH:   reports that he does not drink alcohol. Family History:      Reviewed in detail and negative for DM, CAD, CVA. Positive as follows:        Problem Relation Age of Onset    Colon Cancer Maternal Grandfather         possibly    Colon Polyps Neg Hx        Diet:  Diet NPO Effective Now    REVIEW OF SYSTEMS:   Pertinent positives as noted in the HPI. All other systems reviewed and negative. PHYSICAL EXAM:    BP (!) 90/50   Pulse 83   Temp 97.7 °F (36.5 °C) (Oral)   Resp 14   Ht 5' 10\" (1.778 m)   Wt 135 lb 4.8 oz (61.4 kg)   SpO2 96%   BMI 19.41 kg/m²     General appearance:  Noted apparent distress, appears stated age and cooperative. HEENT:  Normal cephalic, atraumatic without obvious deformity. Pupils equal, round, and reactive to light. Extra ocular muscles intact. Conjunctivae/corneas clear. Neck: Supple, with full range of motion. No jugular venous distention. Trachea midline. Respiratory:  Normal respiratory effort. Clear to auscultation, bilaterally without Rales/Wheezes/Rhonchi. Cardiovascular:  Regular rate and rhythm with normal S1/S2 without rubs or gallops. Abdomen: Soft, non-tender, non-distended with normal bowel sounds. Musculoskeletal:  No clubbing, cyanosis or edema bilaterally. Full range of motion without deformity. Skin: Skin color, texture, turgor normal.  No rashes or lesions. Neurologic:  Neurovascularly intact without any focal sensory/motor deficits.  Cranial nerves: II-XII intact, grossly non-focal.  Psychiatric:  Alert and oriented, thought content appropriate, normal insight  Capillary Refill: Brisk,< 3 seconds   Peripheral Pulses: +2 palpable, equal bilaterally       Labs:     Recent Labs     05/26/19 1820   WBC 5.2   HGB 11.7*   HCT 33.5*        Recent Labs     05/26/19 1820   *   K 4.9   CL 88*   CO2 19*   BUN 27*   CREATININE 1.2   CALCIUM 8.9     Recent Labs     05/26/19 1820   AST 17   ALT 17   BILITOT 0.6   ALKPHOS 75     Recent Labs     05/26/19  1820   INR 0.92     No results for input(s): Floreen Pill in the last 72 hours. Urinalysis:      Lab Results   Component Value Date    NITRU NEGATIVE 05/21/2019    WBCUA NONE SEEN 10/23/2018    BACTERIA NONE 10/23/2018    RBCUA NONE SEEN 10/23/2018    BLOODU NEGATIVE 05/21/2019    SPECGRAV 1.015 05/21/2019    GLUCOSEU >= 1000 05/20/2019       Radiology:       XR CHEST PORTABLE   Final Result   There is no acute intrathoracic process. **This report has been created using voice recognition software. It may contain minor errors which are inherent in voice recognition technology. **      Final report electronically signed by Dr Paola Edwards on 5/26/2019 6:33 PM               DVT prophylaxis: [x] Lovenox                                 [] SCDs                                 [] SQ Heparin                                 [] Encourage ambulation           [] Already on Anticoagulation    Code Status: Full Code      PT/OT Eval Status: Encouraged, if warranted    Disposition:    [x] Home       [] TCU       [] Rehab       [] Psych       [] SNF       [] [place]       [] Other-    ASSESSMENT:    C[address] Problems    Diagnosis Date Noted    Hyperglycemia [R73.9]      Priority: High    Anemia [D64.9] 57/66/9618    Metabolic acidosis [Q87.6] 05/26/2019    Dehydration [E86.0] 05/26/2019    Gastroparesis [K31.84]     Tobacco abuse [Z72.0]     Severe malnutrition ([address] [E43] 09/18/2018     Class: Acute    DKA, type 1, not at goal [facility]) [E10.10] 09/17/2018    Nausea and vomiting [R11.2] 09/17/2018    Essential (primary) hypertension [I10] 07/09/2017       PLAN:  Admit to Telemetry Unit  Diet NPO  Insulin infusion  UA pending  IVF hydration as tolerated  Analgesics PRN  Antiemetics PRN  DVT prophylaxis  PT/OT encouraged, if warranted  IS  Troponin trend  EKG in AM  Will need to re-evaluate home medications in morning  Continue to monitor closely               Thank you Michael Armstrong MD for the opportunity to be involved in this patient's care.     Electronically signed by Mamadou Valdez DO on 5/26/2019 at 10:50 PM

## 2019-05-27 LAB
AMPHETAMINE+METHAMPHETAMINE URINE SCREEN: NEGATIVE
ANION GAP SERPL CALCULATED.3IONS-SCNC: 7 MEQ/L (ref 8–16)
ANION GAP SERPL CALCULATED.3IONS-SCNC: 8 MEQ/L (ref 8–16)
ANION GAP SERPL CALCULATED.3IONS-SCNC: 9 MEQ/L (ref 8–16)
ANION GAP SERPL CALCULATED.3IONS-SCNC: 9 MEQ/L (ref 8–16)
BARBITURATE QUANTITATIVE URINE: NEGATIVE
BASOPHILS # BLD: 0.6 %
BASOPHILS ABSOLUTE: 0 THOU/MM3 (ref 0–0.1)
BENZODIAZEPINE QUANTITATIVE URINE: NEGATIVE
BILIRUBIN URINE: ABNORMAL
BLOOD, URINE: NEGATIVE
BUN BLDV-MCNC: 14 MG/DL (ref 7–22)
BUN BLDV-MCNC: 16 MG/DL (ref 7–22)
BUN BLDV-MCNC: 18 MG/DL (ref 7–22)
BUN BLDV-MCNC: 18 MG/DL (ref 7–22)
CALCIUM SERPL-MCNC: 7.9 MG/DL (ref 8.5–10.5)
CALCIUM SERPL-MCNC: 8 MG/DL (ref 8.5–10.5)
CALCIUM SERPL-MCNC: 8 MG/DL (ref 8.5–10.5)
CALCIUM SERPL-MCNC: 8.1 MG/DL (ref 8.5–10.5)
CANNABINOID QUANTITATIVE URINE: NEGATIVE
CHARACTER, URINE: CLEAR
CHLORIDE BLD-SCNC: 102 MEQ/L (ref 98–111)
CHLORIDE BLD-SCNC: 104 MEQ/L (ref 98–111)
CHLORIDE BLD-SCNC: 105 MEQ/L (ref 98–111)
CHLORIDE BLD-SCNC: 106 MEQ/L (ref 98–111)
CO2: 26 MEQ/L (ref 23–33)
CO2: 27 MEQ/L (ref 23–33)
CO2: 27 MEQ/L (ref 23–33)
CO2: 28 MEQ/L (ref 23–33)
COCAINE METABOLITE QUANTITATIVE URINE: NEGATIVE
COLOR: YELLOW
CREAT SERPL-MCNC: 0.8 MG/DL (ref 0.4–1.2)
CREAT SERPL-MCNC: 0.9 MG/DL (ref 0.4–1.2)
EOSINOPHIL # BLD: 6.2 %
EOSINOPHILS ABSOLUTE: 0.4 THOU/MM3 (ref 0–0.4)
ERYTHROCYTE [DISTWIDTH] IN BLOOD BY AUTOMATED COUNT: 11.8 % (ref 11.5–14.5)
ERYTHROCYTE [DISTWIDTH] IN BLOOD BY AUTOMATED COUNT: 35.9 FL (ref 35–45)
FERRITIN: 134 NG/ML (ref 22–322)
FOLATE: 14.7 NG/ML (ref 4.8–24.2)
GFR SERPL CREATININE-BSD FRML MDRD: > 90 ML/MIN/1.73M2
GLUCOSE BLD-MCNC: 103 MG/DL (ref 70–108)
GLUCOSE BLD-MCNC: 110 MG/DL (ref 70–108)
GLUCOSE BLD-MCNC: 113 MG/DL (ref 70–108)
GLUCOSE BLD-MCNC: 116 MG/DL (ref 70–108)
GLUCOSE BLD-MCNC: 119 MG/DL (ref 70–108)
GLUCOSE BLD-MCNC: 141 MG/DL (ref 70–108)
GLUCOSE BLD-MCNC: 146 MG/DL (ref 70–108)
GLUCOSE BLD-MCNC: 155 MG/DL (ref 70–108)
GLUCOSE BLD-MCNC: 160 MG/DL (ref 70–108)
GLUCOSE BLD-MCNC: 169 MG/DL (ref 70–108)
GLUCOSE BLD-MCNC: 170 MG/DL (ref 70–108)
GLUCOSE BLD-MCNC: 173 MG/DL (ref 70–108)
GLUCOSE BLD-MCNC: 176 MG/DL (ref 70–108)
GLUCOSE BLD-MCNC: 193 MG/DL (ref 70–108)
GLUCOSE BLD-MCNC: 202 MG/DL (ref 70–108)
GLUCOSE BLD-MCNC: 203 MG/DL (ref 70–108)
GLUCOSE BLD-MCNC: 228 MG/DL (ref 70–108)
GLUCOSE BLD-MCNC: 93 MG/DL (ref 70–108)
GLUCOSE BLD-MCNC: 97 MG/DL (ref 70–108)
GLUCOSE BLD-MCNC: 99 MG/DL (ref 70–108)
GLUCOSE URINE: >= 1000 MG/DL
HCT VFR BLD CALC: 34.6 % (ref 42–52)
HEMOCCULT STL QL: NEGATIVE
HEMOGLOBIN: 12.6 GM/DL (ref 14–18)
ICTOTEST: NEGATIVE
IMMATURE GRANS (ABS): 0.01 THOU/MM3 (ref 0–0.07)
IMMATURE GRANULOCYTES: 0.1 %
IRON: 52 UG/DL (ref 65–195)
KETONES, URINE: 15
LEUKOCYTE ESTERASE, URINE: NEGATIVE
LYMPHOCYTES # BLD: 34.4 %
LYMPHOCYTES ABSOLUTE: 2.3 THOU/MM3 (ref 1–4.8)
MAGNESIUM: 1.6 MG/DL (ref 1.6–2.4)
MAGNESIUM: 1.6 MG/DL (ref 1.6–2.4)
MAGNESIUM: 1.7 MG/DL (ref 1.6–2.4)
MCH RBC QN AUTO: 30.6 PG (ref 26–33)
MCHC RBC AUTO-ENTMCNC: 36.4 GM/DL (ref 32.2–35.5)
MCV RBC AUTO: 84 FL (ref 80–94)
MONOCYTES # BLD: 7.7 %
MONOCYTES ABSOLUTE: 0.5 THOU/MM3 (ref 0.4–1.3)
NITRITE, URINE: NEGATIVE
NUCLEATED RED BLOOD CELLS: 0 /100 WBC
OPIATES, URINE: NEGATIVE
OXYCODONE: NEGATIVE
PH UA: 6 (ref 5–9)
PHENCYCLIDINE QUANTITATIVE URINE: NEGATIVE
PHOSPHORUS: 2.1 MG/DL (ref 2.4–4.7)
PHOSPHORUS: 2.2 MG/DL (ref 2.4–4.7)
PHOSPHORUS: 2.2 MG/DL (ref 2.4–4.7)
PLATELET # BLD: 151 THOU/MM3 (ref 130–400)
PMV BLD AUTO: 11 FL (ref 9.4–12.4)
POTASSIUM SERPL-SCNC: 3.6 MEQ/L (ref 3.5–5.2)
POTASSIUM SERPL-SCNC: 3.7 MEQ/L (ref 3.5–5.2)
POTASSIUM SERPL-SCNC: 3.7 MEQ/L (ref 3.5–5.2)
POTASSIUM SERPL-SCNC: 3.8 MEQ/L (ref 3.5–5.2)
PROTEIN UA: NEGATIVE
RBC # BLD: 4.12 MILL/MM3 (ref 4.7–6.1)
SEG NEUTROPHILS: 51 %
SEGMENTED NEUTROPHILS ABSOLUTE COUNT: 3.4 THOU/MM3 (ref 1.8–7.7)
SODIUM BLD-SCNC: 138 MEQ/L (ref 135–145)
SODIUM BLD-SCNC: 139 MEQ/L (ref 135–145)
SODIUM BLD-SCNC: 140 MEQ/L (ref 135–145)
SODIUM BLD-SCNC: 141 MEQ/L (ref 135–145)
SPECIFIC GRAVITY, URINE: 1.02 (ref 1–1.03)
TROPONIN T: < 0.01 NG/ML
TROPONIN T: < 0.01 NG/ML
UROBILINOGEN, URINE: 0.2 EU/DL (ref 0–1)
VITAMIN B-12: 532 PG/ML (ref 211–911)
WBC # BLD: 6.7 THOU/MM3 (ref 4.8–10.8)

## 2019-05-27 PROCEDURE — 82607 VITAMIN B-12: CPT

## 2019-05-27 PROCEDURE — 83540 ASSAY OF IRON: CPT

## 2019-05-27 PROCEDURE — 2500000003 HC RX 250 WO HCPCS: Performed by: INTERNAL MEDICINE

## 2019-05-27 PROCEDURE — 84100 ASSAY OF PHOSPHORUS: CPT

## 2019-05-27 PROCEDURE — 82728 ASSAY OF FERRITIN: CPT

## 2019-05-27 PROCEDURE — 82272 OCCULT BLD FECES 1-3 TESTS: CPT

## 2019-05-27 PROCEDURE — 6370000000 HC RX 637 (ALT 250 FOR IP): Performed by: FAMILY MEDICINE

## 2019-05-27 PROCEDURE — 2060000000 HC ICU INTERMEDIATE R&B

## 2019-05-27 PROCEDURE — 36415 COLL VENOUS BLD VENIPUNCTURE: CPT

## 2019-05-27 PROCEDURE — 83735 ASSAY OF MAGNESIUM: CPT

## 2019-05-27 PROCEDURE — 81003 URINALYSIS AUTO W/O SCOPE: CPT

## 2019-05-27 PROCEDURE — 2580000003 HC RX 258: Performed by: INTERNAL MEDICINE

## 2019-05-27 PROCEDURE — 85025 COMPLETE CBC W/AUTO DIFF WBC: CPT

## 2019-05-27 PROCEDURE — 80048 BASIC METABOLIC PNL TOTAL CA: CPT

## 2019-05-27 PROCEDURE — 2709999900 HC NON-CHARGEABLE SUPPLY

## 2019-05-27 PROCEDURE — 6370000000 HC RX 637 (ALT 250 FOR IP): Performed by: INTERNAL MEDICINE

## 2019-05-27 PROCEDURE — 82948 REAGENT STRIP/BLOOD GLUCOSE: CPT

## 2019-05-27 PROCEDURE — 93005 ELECTROCARDIOGRAM TRACING: CPT | Performed by: INTERNAL MEDICINE

## 2019-05-27 PROCEDURE — 80307 DRUG TEST PRSMV CHEM ANLYZR: CPT

## 2019-05-27 PROCEDURE — 84484 ASSAY OF TROPONIN QUANT: CPT

## 2019-05-27 PROCEDURE — 82746 ASSAY OF FOLIC ACID SERUM: CPT

## 2019-05-27 PROCEDURE — 99232 SBSQ HOSP IP/OBS MODERATE 35: CPT | Performed by: FAMILY MEDICINE

## 2019-05-27 RX ORDER — INSULIN GLARGINE 100 [IU]/ML
10 INJECTION, SOLUTION SUBCUTANEOUS 2 TIMES DAILY
Status: DISCONTINUED | OUTPATIENT
Start: 2019-05-28 | End: 2019-05-28 | Stop reason: HOSPADM

## 2019-05-27 RX ORDER — INSULIN GLARGINE 100 [IU]/ML
10 INJECTION, SOLUTION SUBCUTANEOUS NIGHTLY
Status: DISCONTINUED | OUTPATIENT
Start: 2019-05-27 | End: 2019-05-27

## 2019-05-27 RX ORDER — DEXTROSE MONOHYDRATE 25 G/50ML
12.5 INJECTION, SOLUTION INTRAVENOUS PRN
Status: DISCONTINUED | OUTPATIENT
Start: 2019-05-27 | End: 2019-05-28 | Stop reason: HOSPADM

## 2019-05-27 RX ORDER — NICOTINE POLACRILEX 4 MG
15 LOZENGE BUCCAL PRN
Status: DISCONTINUED | OUTPATIENT
Start: 2019-05-27 | End: 2019-05-28 | Stop reason: HOSPADM

## 2019-05-27 RX ORDER — DEXTROSE MONOHYDRATE 50 MG/ML
100 INJECTION, SOLUTION INTRAVENOUS PRN
Status: DISCONTINUED | OUTPATIENT
Start: 2019-05-27 | End: 2019-05-28 | Stop reason: HOSPADM

## 2019-05-27 RX ADMIN — INSULIN GLARGINE 10 UNITS: 100 INJECTION, SOLUTION SUBCUTANEOUS at 13:19

## 2019-05-27 RX ADMIN — METOPROLOL TARTRATE 25 MG: 25 TABLET ORAL at 21:18

## 2019-05-27 RX ADMIN — ACETAMINOPHEN 650 MG: 325 TABLET ORAL at 05:35

## 2019-05-27 RX ADMIN — ACETAMINOPHEN 650 MG: 325 TABLET ORAL at 11:18

## 2019-05-27 RX ADMIN — GABAPENTIN 600 MG: 600 TABLET, FILM COATED ORAL at 07:29

## 2019-05-27 RX ADMIN — CYANOCOBALAMIN TAB 1000 MCG 1000 MCG: 1000 TAB at 07:30

## 2019-05-27 RX ADMIN — GABAPENTIN 600 MG: 600 TABLET, FILM COATED ORAL at 21:18

## 2019-05-27 RX ADMIN — SIMVASTATIN 20 MG: 20 TABLET, FILM COATED ORAL at 21:18

## 2019-05-27 RX ADMIN — DULOXETINE HYDROCHLORIDE 60 MG: 60 CAPSULE, DELAYED RELEASE ORAL at 07:29

## 2019-05-27 RX ADMIN — DEXTROSE AND SODIUM CHLORIDE: 5; 450 INJECTION, SOLUTION INTRAVENOUS at 14:15

## 2019-05-27 RX ADMIN — SODIUM PHOSPHATE, MONOBASIC, MONOHYDRATE 15 MMOL: 276; 142 INJECTION, SOLUTION INTRAVENOUS at 15:22

## 2019-05-27 RX ADMIN — INSULIN LISPRO 2 UNITS: 100 INJECTION, SOLUTION INTRAVENOUS; SUBCUTANEOUS at 21:18

## 2019-05-27 RX ADMIN — INSULIN LISPRO 4 UNITS: 100 INJECTION, SOLUTION INTRAVENOUS; SUBCUTANEOUS at 17:51

## 2019-05-27 RX ADMIN — HYDROXYZINE HYDROCHLORIDE 25 MG: 25 TABLET ORAL at 21:18

## 2019-05-27 RX ADMIN — DEXTROSE AND SODIUM CHLORIDE: 5; 450 INJECTION, SOLUTION INTRAVENOUS at 07:20

## 2019-05-27 RX ADMIN — GABAPENTIN 600 MG: 600 TABLET, FILM COATED ORAL at 14:15

## 2019-05-27 ASSESSMENT — PAIN DESCRIPTION - FREQUENCY: FREQUENCY: CONTINUOUS

## 2019-05-27 ASSESSMENT — PAIN DESCRIPTION - PROGRESSION: CLINICAL_PROGRESSION: NOT CHANGED

## 2019-05-27 ASSESSMENT — PAIN DESCRIPTION - PAIN TYPE
TYPE: CHRONIC PAIN

## 2019-05-27 ASSESSMENT — PAIN - FUNCTIONAL ASSESSMENT: PAIN_FUNCTIONAL_ASSESSMENT: ACTIVITIES ARE NOT PREVENTED

## 2019-05-27 ASSESSMENT — PAIN SCALES - GENERAL
PAINLEVEL_OUTOF10: 3
PAINLEVEL_OUTOF10: 3
PAINLEVEL_OUTOF10: 4
PAINLEVEL_OUTOF10: 3
PAINLEVEL_OUTOF10: 5
PAINLEVEL_OUTOF10: 5
PAINLEVEL_OUTOF10: 3
PAINLEVEL_OUTOF10: 3
PAINLEVEL_OUTOF10: 5

## 2019-05-27 ASSESSMENT — PAIN DESCRIPTION - LOCATION
LOCATION: BACK

## 2019-05-27 ASSESSMENT — PAIN DESCRIPTION - ORIENTATION
ORIENTATION: LOWER;MID
ORIENTATION: LOWER
ORIENTATION: LOWER

## 2019-05-27 ASSESSMENT — PAIN DESCRIPTION - DESCRIPTORS
DESCRIPTORS: ACHING

## 2019-05-27 ASSESSMENT — PAIN DESCRIPTION - ONSET: ONSET: ON-GOING

## 2019-05-27 NOTE — ED NOTES
Pt resting in bed, eyes closed. Pt updated on new room number for admission. Pt verbalized understanding. Call light in reach.       Hector Alexander RN  05/26/19 2031

## 2019-05-27 NOTE — ED NOTES
Patient transported to [phone]  by cart in stable condition. Patient monitored on cardiac telemetry. IV infusing and line is patent.         Stephan Almonte, EMT-P  05/26/19 1993

## 2019-05-27 NOTE — PROGRESS NOTES
Diet:  DIET CARB CONTROL; Carb Control: 4 carb choices (60 gms)/meal    Exam:  BP (!) 155/84   Pulse 77   Temp 97.8 °F (36.6 °C) (Oral)   Resp 18   Ht 5' 10\" (1.778 m)   Wt 135 lb 14.4 oz (61.6 kg)   SpO2 98%   BMI 19.50 kg/m²     General appearance: No apparent distress, appears stated age and cooperative. HEENT: Pupils equal, round, and reactive to light. Conjunctivae/corneas clear. Neck: Supple, with full range of motion. No jugular venous distention. Trachea midline. Respiratory:  Normal respiratory effort. Clear to auscultation, bilaterally without Rales/Wheezes/Rhonchi. Cardiovascular: Regular rate and rhythm with normal S1/S2 without murmurs, rubs or gallops. Abdomen: Soft, non-tender, non-distended with normal bowel sounds. Musculoskeletal: passive and active ROM x 4 extremities. Skin: Skin color, texture, turgor normal.  No rashes or lesions. Neurologic:  Neurovascularly intact without any focal sensory/motor deficits. Cranial nerves: II-XII intact, grossly non-focal.  Psychiatric: Alert and oriented, thought content appropriate, normal insight  Capillary Refill: Brisk,< 3 seconds   Peripheral Pulses: +2 palpable, equal bilaterally       Labs:   Recent Labs     05/26/19 1820 05/27/19  0201   WBC 5.2 6.7   HGB 11.7* 12.6*   HCT 33.5* 34.6*    151     Recent Labs     05/27/19  0201 05/27/19  0344 05/27/19  0950 05/27/19  1201    140 139 138   K 3.8 3.7 3.6 3.7    105 104 102   CO2 26 27 28 27   BUN 18 18 16 14   CREATININE 0.8 0.9 0.8 0.8   CALCIUM 8.0* 8.0* 8.1* 7.9*   PHOS 2.2* 2.1* 2.2*  --      Recent Labs     05/26/19 1820   AST 17   ALT 17   BILITOT 0.6   ALKPHOS 75     Recent Labs     05/26/19 1820   INR 0.92     No results for input(s): Rose Lacrosse in the last 72 hours.     Urinalysis:      Lab Results   Component Value Date    NITRU NEGATIVE 05/27/2019    WBCUA NONE SEEN 10/23/2018    BACTERIA NONE 10/23/2018    RBCUA NONE SEEN 10/23/2018    BLOODU NEGATIVE 05/27/2019    SPECGRAV 1.015 05/21/2019    GLUCOSEU >= 1000 05/27/2019       Radiology:  XR CHEST PORTABLE   Final Result   There is no acute intrathoracic process. **This report has been created using voice recognition software. It may contain minor errors which are inherent in voice recognition technology. **      Final report electronically signed by Dr Lamberto Washington on 5/26/2019 6:33 PM          Diet: DIET CARB CONTROL; Carb Control: 4 carb choices (60 gms)/meal    DVT prophylaxis: [] Lovenox                                 [] SCDs                                 [] SQ Heparin                                 [] Encourage ambulation           [] Already on Anticoagulation     Disposition:    [] Home       [] TCU       [] Rehab       [] Psych       [] SNF       [] [place]       [] Other-    Code Status: Full Code    PT/OT Eval Status:   Assessment/Plan:    Anticipated Discharge in :     C[facility] [address] Problems    Diagnosis Date Noted    Anemia [D64.9] 88/19/1468    Metabolic acidosis [Q38.5] 05/26/2019    Dehydration [E86.0] 05/26/2019    Hyperglycemia [R73.9]     Gastroparesis [K31.84]     Tobacco abuse [Z72.0]     Severe malnutrition ([facility] [address] [E43] 09/18/2018     Class: Acute    DKA, type 1, not at goal [facility]) [E10.10] 09/17/2018    Nausea and vomiting [R11.2] 09/17/2018    Essential (primary) hypertension [I10] 07/09/2017       DKA, type 1  Elevated HgbA1C 9%  AG closed, glucose 100-200  Discontinue insulin drip  Shift to SQ Lantus and Humalog SS  Once port is available, reattach insulin pump  No source of infection at this time    Metabolic acidosis  Due to hyperglycemia/DKA  Resolved    Essential HTN  Continue metoprolol    Pseudohyponatremia   Due to hyperglycemia    Dehydration  IVF, restart diet    Tobacco abuse  counseling    Electronically signed by Danny Low MD on 5/27/2019 at 4:41 PM

## 2019-05-27 NOTE — PROGRESS NOTES
Patient stated his roommate will be bringing his insulin pump supplies from home. Patient instructed to contact nurse to verify settings prior to using pump. Patient verbalized understanding to this RN.

## 2019-05-28 VITALS
DIASTOLIC BLOOD PRESSURE: 73 MMHG | RESPIRATION RATE: 18 BRPM | OXYGEN SATURATION: 97 % | BODY MASS INDEX: 19.41 KG/M2 | WEIGHT: 135.6 LBS | HEART RATE: 74 BPM | TEMPERATURE: 98.3 F | SYSTOLIC BLOOD PRESSURE: 104 MMHG | HEIGHT: 70 IN

## 2019-05-28 LAB
ANION GAP SERPL CALCULATED.3IONS-SCNC: 10 MEQ/L (ref 8–16)
BUN BLDV-MCNC: 11 MG/DL (ref 7–22)
CALCIUM SERPL-MCNC: 8.7 MG/DL (ref 8.5–10.5)
CHLORIDE BLD-SCNC: 98 MEQ/L (ref 98–111)
CO2: 26 MEQ/L (ref 23–33)
CREAT SERPL-MCNC: 0.8 MG/DL (ref 0.4–1.2)
EKG ATRIAL RATE: 75 BPM
EKG P AXIS: 84 DEGREES
EKG P-R INTERVAL: 130 MS
EKG Q-T INTERVAL: 386 MS
EKG QRS DURATION: 82 MS
EKG QTC CALCULATION (BAZETT): 431 MS
EKG R AXIS: 90 DEGREES
EKG T AXIS: 72 DEGREES
EKG VENTRICULAR RATE: 75 BPM
ERYTHROCYTE [DISTWIDTH] IN BLOOD BY AUTOMATED COUNT: 11.9 % (ref 11.5–14.5)
ERYTHROCYTE [DISTWIDTH] IN BLOOD BY AUTOMATED COUNT: 37 FL (ref 35–45)
GFR SERPL CREATININE-BSD FRML MDRD: > 90 ML/MIN/1.73M2
GLUCOSE BLD-MCNC: 218 MG/DL (ref 70–108)
GLUCOSE BLD-MCNC: 253 MG/DL (ref 70–108)
GLUCOSE BLD-MCNC: 281 MG/DL (ref 70–108)
GLUCOSE BLD-MCNC: 284 MG/DL (ref 70–108)
HCT VFR BLD CALC: 40.5 % (ref 42–52)
HEMOGLOBIN: 14.7 GM/DL (ref 14–18)
MCH RBC QN AUTO: 31.1 PG (ref 26–33)
MCHC RBC AUTO-ENTMCNC: 36.3 GM/DL (ref 32.2–35.5)
MCV RBC AUTO: 85.6 FL (ref 80–94)
MRSA SCREEN: NORMAL
PLATELET # BLD: 159 THOU/MM3 (ref 130–400)
PMV BLD AUTO: 10.8 FL (ref 9.4–12.4)
POTASSIUM SERPL-SCNC: 4.2 MEQ/L (ref 3.5–5.2)
RBC # BLD: 4.73 MILL/MM3 (ref 4.7–6.1)
SODIUM BLD-SCNC: 134 MEQ/L (ref 135–145)
WBC # BLD: 5.9 THOU/MM3 (ref 4.8–10.8)

## 2019-05-28 PROCEDURE — 6370000000 HC RX 637 (ALT 250 FOR IP): Performed by: FAMILY MEDICINE

## 2019-05-28 PROCEDURE — 85027 COMPLETE CBC AUTOMATED: CPT

## 2019-05-28 PROCEDURE — 6370000000 HC RX 637 (ALT 250 FOR IP): Performed by: INTERNAL MEDICINE

## 2019-05-28 PROCEDURE — 93010 ELECTROCARDIOGRAM REPORT: CPT | Performed by: INTERNAL MEDICINE

## 2019-05-28 PROCEDURE — 80048 BASIC METABOLIC PNL TOTAL CA: CPT

## 2019-05-28 PROCEDURE — 36415 COLL VENOUS BLD VENIPUNCTURE: CPT

## 2019-05-28 PROCEDURE — 99238 HOSP IP/OBS DSCHRG MGMT 30/<: CPT | Performed by: FAMILY MEDICINE

## 2019-05-28 PROCEDURE — 82948 REAGENT STRIP/BLOOD GLUCOSE: CPT

## 2019-05-28 RX ADMIN — CYANOCOBALAMIN TAB 1000 MCG 1000 MCG: 1000 TAB at 08:08

## 2019-05-28 RX ADMIN — INSULIN GLARGINE 10 UNITS: 100 INJECTION, SOLUTION SUBCUTANEOUS at 09:02

## 2019-05-28 RX ADMIN — INSULIN LISPRO 6 UNITS: 100 INJECTION, SOLUTION INTRAVENOUS; SUBCUTANEOUS at 09:02

## 2019-05-28 RX ADMIN — METOPROLOL TARTRATE 25 MG: 25 TABLET ORAL at 08:08

## 2019-05-28 RX ADMIN — INSULIN LISPRO 6 UNITS: 100 INJECTION, SOLUTION INTRAVENOUS; SUBCUTANEOUS at 11:59

## 2019-05-28 RX ADMIN — GABAPENTIN 600 MG: 600 TABLET, FILM COATED ORAL at 08:08

## 2019-05-28 RX ADMIN — DULOXETINE HYDROCHLORIDE 60 MG: 60 CAPSULE, DELAYED RELEASE ORAL at 08:08

## 2019-05-28 ASSESSMENT — PAIN SCALES - GENERAL
PAINLEVEL_OUTOF10: 3

## 2019-05-28 NOTE — PROGRESS NOTES
5/28/2019 1109  Last data filed at 5/28/2019 0325  Gross per 24 hour   Intake 2444.17 ml   Output 1200 ml   Net 1244.17 ml       Diet:  DIET CARB CONTROL; Carb Control: 4 carb choices (60 gms)/meal    Exam:  /80   Pulse 72   Temp 96.3 °F (35.7 °C) (Axillary)   Resp 16   Ht 5' 10\" (1.778 m)   Wt 135 lb 9.6 oz (61.5 kg)   SpO2 98%   BMI 19.46 kg/m²     General appearance: No apparent distress, appears stated age and cooperative. HEENT: Pupils equal, round, and reactive to light. Conjunctivae/corneas clear. Neck: Supple, with full range of motion. No jugular venous distention. Trachea midline. Respiratory:  Normal respiratory effort. Clear to auscultation, bilaterally without Rales/Wheezes/Rhonchi. Cardiovascular: Regular rate and rhythm with normal S1/S2 without murmurs, rubs or gallops. Abdomen: Soft, non-tender, non-distended with normal bowel sounds. Musculoskeletal: passive and active ROM x 4 extremities. Skin: Skin color, texture, turgor normal.  No rashes or lesions. Neurologic:  Neurovascularly intact without any focal sensory/motor deficits.  Cranial nerves: II-XII intact, grossly non-focal.  Psychiatric: Alert and oriented, thought content appropriate, normal insight  Capillary Refill: Brisk,< 3 seconds   Peripheral Pulses: +2 palpable, equal bilaterally       Labs:   Recent Labs     05/26/19  1820 05/27/19  0201 05/28/19  0531   WBC 5.2 6.7 5.9   HGB 11.7* 12.6* 14.7   HCT 33.5* 34.6* 40.5*    151 159     Recent Labs     05/27/19  0201 05/27/19  0344 05/27/19  0950 05/27/19  1201 05/28/19  0531    140 139 138 134*   K 3.8 3.7 3.6 3.7 4.2    105 104 102 98   CO2 26 27 28 27 26   BUN 18 18 16 14 11   CREATININE 0.8 0.9 0.8 0.8 0.8   CALCIUM 8.0* 8.0* 8.1* 7.9* 8.7   PHOS 2.2* 2.1* 2.2*  --   --      Recent Labs     05/26/19 1820   AST 17   ALT 17   BILITOT 0.6   ALKPHOS 75     Recent Labs     05/26/19 1820   INR 0.92     No results for input(s): Jose Tate in the last 72 hours. Urinalysis:      Lab Results   Component Value Date    NITRU NEGATIVE 05/27/2019    WBCUA NONE SEEN 10/23/2018    BACTERIA NONE 10/23/2018    RBCUA NONE SEEN 10/23/2018    BLOODU NEGATIVE 05/27/2019    SPECGRAV 1.015 05/21/2019    GLUCOSEU >= 1000 05/27/2019       Radiology:  XR CHEST PORTABLE   Final Result   There is no acute intrathoracic process. **This report has been created using voice recognition software. It may contain minor errors which are inherent in voice recognition technology. **      Final report electronically signed by Dr Crow Marquez on 5/26/2019 6:33 PM          Diet: DIET CARB CONTROL; Carb Control: 4 carb choices (60 gms)/meal    DVT prophylaxis: [] Lovenox                                 [] SCDs                                 [] SQ Heparin                                 [] Encourage ambulation           [] Already on Anticoagulation     Disposition:    [] Home       [] TCU       [] Rehab       [] Psych       [] SNF       [] [place]       [] Other-    Code Status: Full Code    PT/OT Eval Status:   Assessment/Plan:    Anticipated Discharge in :     C[facility] [address] Problems    Diagnosis Date Noted    Anemia [D64.9] 16/60/8190    Metabolic acidosis [G14.8] 05/26/2019    Dehydration [E86.0] 05/26/2019    Hyperglycemia [R73.9]     Gastroparesis [K31.84]     Tobacco abuse [Z72.0]     Severe malnutrition ([address] [E43] 09/18/2018     Class: Acute    DKA, type 1, not at goal [facility]) [E10.10] 09/17/2018    Nausea and vomiting [R11.2] 09/17/2018    Essential (primary) hypertension [I10] 07/09/2017       DKA, type 1  Elevated HgbA1C 9%  AG closed, glucose 100-200  Discontinue insulin drip  Shift to SQ Lantus and Humalog SS  Once port is available, reattach insulin pump  No source of infection at this time    Metabolic acidosis  Due to hyperglycemia/DKA  Resolved    Essential HTN  Continue metoprolol    Pseudohyponatremia   Due to

## 2019-05-28 NOTE — PROGRESS NOTES
CLINICAL PHARMACY: [address] [facility] Select Patient?: No  Total # of Interventions Recommended: 0   -   Total # Interventions Accepted: 0  Intervention Severity:   - Level 1 Intervention Present?: No   - Level 2 #: 0   - Level 3 #: 0   Time Spent (min): 15    Additional Documentation:    Deonna Dai, PharmD, BCPS   5/28/2019  5:33 PM

## 2019-05-28 NOTE — DISCHARGE SUMMARY
Hospital Medicine Discharge Summary      Patient Identification:   Kori Augustin   : 1975  MRN: [id number]   Account: [de-identified]      Patient's PCP: Norah Bourne MD    Admit Date: 2019     Discharge Date:   2019    Admitting Physician: Galilea Lagos DO     Discharge Physician: Hardik Griffith MD     Discharge Diagnoses: Active Hospital Problems    Diagnosis Date Noted    Anemia [D64.9]     Metabolic acidosis [Y59.0] 2019    Dehydration [E86.0] 2019    Hyperglycemia [R73.9]     Gastroparesis [K31.84]     Tobacco abuse [Z72.0]     Severe malnutrition ([address] [E43] 2018     Class: Acute    DKA, type 1, not at goal [facility]) [E10.10] 2018    Nausea and vomiting [R11.2] 2018    Essential (primary) hypertension [I10] 2017       The patient was seen and examined on day of discharge and this discharge summary is in conjunction with any daily progress note from day of discharge. Hospital Course:   Kori Augustin is a 40 y.o. male admitted to [address] [place] [address] on 2019 for elevated glucose, managed as DKA, due to non compliance with insulin pump. He admits being a type 1 diabetic and being hospitalized last week for similar complaints.  Patient states that he was discharged last week with uncontrolled blood sugars.  Patient reports having an insulin pump, functioning but did not notice that the port or the [employer] Hoist was not attached, stated that it was just an accident. Patient reports feeling sick recently with Nausea with emesis. PMH HTN, gastroparesis. He is a Tobacco user.       Labs showed metabolic acidosis and elevated hydroxybutyrate. He was started on Insulin drip, eventually shifted to SQ insulin. Tried to have his insulin pump \"quickset\" or port brought by family or girlfriend to the hospital so it can be restarted while admitted, unfortunately patient said nobody is available to do so.  He was adamant about going home, and returning to work, threatened to leave AMA if not discharged. Discussed with  and discharge planner, due to patient's uncompliance. Patient agreed to getting Lantus and Humalog prior to discharge and will be taken straight to darinel, and restart his pump. Discharged stable and will follow-up with PCP and Endo as outpatient. Exam:     Vitals:  Vitals:    05/27/19 2308 05/28/19 0325 05/28/19 0804 05/28/19 1119   BP: 128/87 99/74 135/80 104/73   Pulse: 80 75 72 74   Resp: 16 16 16 18   Temp: 98 °F (36.7 °C) 98.3 °F (36.8 °C) 96.3 °F (35.7 °C) 98.3 °F (36.8 °C)   TempSrc: Oral Oral Axillary Oral   SpO2: 99% 100% 98% 97%   Weight:  135 lb 9.6 oz (61.5 kg)     Height:         Weight: Weight: 135 lb 9.6 oz (61.5 kg)     24 hour intake/output:    Intake/Output Summary (Last 24 hours) at 5/28/2019 1128  Last data filed at 5/28/2019 0325  Gross per 24 hour   Intake 2444.17 ml   Output 1200 ml   Net 1244.17 ml         General appearance:  No apparent distress, appears stated age and cooperative. HEENT:  Normal cephalic, atraumatic without obvious deformity. Pupils equal, round, and reactive to light. Extra ocular muscles intact. Conjunctivae/corneas clear. Neck: Supple, with full range of motion. No jugular venous distention. Trachea midline. Respiratory:  Normal respiratory effort. Clear to auscultation, bilaterally without Rales/Wheezes/Rhonchi. Cardiovascular:  Regular rate and rhythm with normal S1/S2 without murmurs, rubs or gallops. Abdomen: Soft, non-tender, non-distended with normal bowel sounds. Musculoskeletal:  No clubbing, cyanosis or edema bilaterally. Full range of motion without deformity. Skin: Skin color, texture, turgor normal.  No rashes or lesions. Neurologic:  Neurovascularly intact without any focal sensory/motor deficits.  Cranial nerves: II-XII intact, grossly non-focal.  Psychiatric:  Alert and oriented, thought content appropriate, normal mouth nightly             Insulin Pump KAI  Inject 1 each into the skin See Admin Instructions             metoprolol tartrate (LOPRESSOR) 25 MG tablet  Take 1 tablet by mouth 2 times daily             simvastatin (ZOCOR) 20 MG tablet  Take 1 tablet by mouth nightly                 Time Spent on discharge is more than 30 minutes in the examination, evaluation, counseling and review of medications and discharge plan. Signed: Thank you Naima Martinez MD for the opportunity to be involved in this patient's care.     Electronically signed by Dayana Lawrence MD on 5/28/2019 at 11:28 AM

## 2019-05-28 NOTE — CARE COORDINATION
Update: spoke with client who reports friend is sick and mother does not know where he resides; therefore insulin pump parts cannot be brought to the hospital; he wants to be discharged home (and not sign [facility] form) today and go to work or may leave [facility]; updated Malena Delcid, manager. I updated Attending who reports likely send home with Lantus insulin today and f/u with [facility] provider.    Electronically signed by Rusty Beckwith RN on 5/28/2019 at 11:07 AM

## 2019-05-28 NOTE — PROGRESS NOTES
Discharge teaching and instructions for diagnosis/procedure of hyperglycemia completed with patient using teachback method. AVS reviewed. Printed prescriptions given to patient. Patient voiced understanding regarding prescriptions, follow up appointments, and care of self at home.  Discharged ambulatory to  home with support per friend

## 2019-05-29 LAB
EKG ATRIAL RATE: 69 BPM
EKG P AXIS: 75 DEGREES
EKG P-R INTERVAL: 134 MS
EKG Q-T INTERVAL: 438 MS
EKG QRS DURATION: 84 MS
EKG QTC CALCULATION (BAZETT): 469 MS
EKG R AXIS: 83 DEGREES
EKG T AXIS: 90 DEGREES
EKG VENTRICULAR RATE: 69 BPM

## 2019-05-29 PROCEDURE — 93010 ELECTROCARDIOGRAM REPORT: CPT | Performed by: INTERNAL MEDICINE

## 2019-06-20 PROBLEM — J06.9 URI (UPPER RESPIRATORY INFECTION): Status: RESOLVED | Noted: 2019-05-21 | Resolved: 2019-06-20

## 2019-06-25 PROBLEM — E86.0 DEHYDRATION: Status: RESOLVED | Noted: 2019-05-26 | Resolved: 2019-06-25

## 2020-07-26 ENCOUNTER — HOSPITAL ENCOUNTER (EMERGENCY)
Age: 45
Discharge: HOME | End: 2020-07-26
Attending: EMERGENCY MEDICINE
Payer: OTHER GOVERNMENT

## 2020-07-26 ENCOUNTER — APPOINTMENT (OUTPATIENT)
Dept: GENERAL RADIOLOGY | Age: 45
End: 2020-07-26
Payer: OTHER GOVERNMENT

## 2020-07-26 VITALS
HEIGHT: 70 IN | HEART RATE: 70 BPM | TEMPERATURE: 98 F | DIASTOLIC BLOOD PRESSURE: 72 MMHG | RESPIRATION RATE: 12 BRPM | WEIGHT: 125 LBS | SYSTOLIC BLOOD PRESSURE: 113 MMHG | OXYGEN SATURATION: 98 % | BODY MASS INDEX: 17.9 KG/M2

## 2020-07-26 LAB
ANION GAP SERPL CALCULATED.3IONS-SCNC: 14 MEQ/L (ref 8–16)
BASE EXCESS MIXED: 3.3 MMOL/L (ref -2–3)
BASOPHILS # BLD: 1 %
BASOPHILS ABSOLUTE: 0.1 THOU/MM3 (ref 0–0.1)
BETA-HYDROXYBUTYRATE: 14.23 MG/DL (ref 0.2–2.81)
BUN BLDV-MCNC: 31 MG/DL (ref 7–22)
CALCIUM SERPL-MCNC: 9 MG/DL (ref 8.5–10.5)
CHLORIDE BLD-SCNC: 92 MEQ/L (ref 98–111)
CO2: 25 MEQ/L (ref 23–33)
COLLECTED BY:: ABNORMAL
CREAT SERPL-MCNC: 1.2 MG/DL (ref 0.4–1.2)
DEVICE: ABNORMAL
EKG ATRIAL RATE: 86 BPM
EKG P AXIS: 83 DEGREES
EKG P-R INTERVAL: 126 MS
EKG Q-T INTERVAL: 318 MS
EKG QRS DURATION: 84 MS
EKG QTC CALCULATION (BAZETT): 380 MS
EKG R AXIS: 84 DEGREES
EKG T AXIS: 81 DEGREES
EKG VENTRICULAR RATE: 86 BPM
EOSINOPHIL # BLD: 3 %
EOSINOPHILS ABSOLUTE: 0.2 THOU/MM3 (ref 0–0.4)
ERYTHROCYTE [DISTWIDTH] IN BLOOD BY AUTOMATED COUNT: 11.7 % (ref 11.5–14.5)
ERYTHROCYTE [DISTWIDTH] IN BLOOD BY AUTOMATED COUNT: 37.6 FL (ref 35–45)
GFR SERPL CREATININE-BSD FRML MDRD: 65 ML/MIN/1.73M2
GLUCOSE BLD-MCNC: 317 MG/DL (ref 70–108)
GLUCOSE BLD-MCNC: 371 MG/DL
GLUCOSE BLD-MCNC: 442 MG/DL (ref 70–108)
HCO3, MIXED: 29 MMOL/L (ref 23–28)
HCT VFR BLD CALC: 41.4 % (ref 42–52)
HEMOGLOBIN: 14.5 GM/DL (ref 14–18)
IMMATURE GRANS (ABS): 0.01 THOU/MM3 (ref 0–0.07)
IMMATURE GRANULOCYTES: 0.2 %
LYMPHOCYTES # BLD: 37 %
LYMPHOCYTES ABSOLUTE: 2.1 THOU/MM3 (ref 1–4.8)
MCH RBC QN AUTO: 30.9 PG (ref 26–33)
MCHC RBC AUTO-ENTMCNC: 35 GM/DL (ref 32.2–35.5)
MCV RBC AUTO: 88.1 FL (ref 80–94)
MONOCYTES # BLD: 6.1 %
MONOCYTES ABSOLUTE: 0.4 THOU/MM3 (ref 0.4–1.3)
NUCLEATED RED BLOOD CELLS: 0 /100 WBC
O2 SAT, MIXED: 93 %
OSMOLALITY CALCULATION: 288.3 MOSMOL/KG (ref 275–300)
PCO2, MIXED VENOUS: 47 MMHG (ref 41–51)
PH, MIXED: 7.4 (ref 7.31–7.41)
PLATELET # BLD: 195 THOU/MM3 (ref 130–400)
PMV BLD AUTO: 12.1 FL (ref 9.4–12.4)
PO2 MIXED: 68 MMHG (ref 25–40)
POTASSIUM REFLEX MAGNESIUM: 4 MEQ/L (ref 3.5–5.2)
PRO-BNP: 87.7 PG/ML (ref 0–450)
RBC # BLD: 4.7 MILL/MM3 (ref 4.7–6.1)
SEG NEUTROPHILS: 52.7 %
SEGMENTED NEUTROPHILS ABSOLUTE COUNT: 3.1 THOU/MM3 (ref 1.8–7.7)
SODIUM BLD-SCNC: 131 MEQ/L (ref 135–145)
TROPONIN T: < 0.01 NG/ML
WBC # BLD: 5.8 THOU/MM3 (ref 4.8–10.8)

## 2020-07-26 PROCEDURE — 80048 BASIC METABOLIC PNL TOTAL CA: CPT

## 2020-07-26 PROCEDURE — 84484 ASSAY OF TROPONIN QUANT: CPT

## 2020-07-26 PROCEDURE — 82948 REAGENT STRIP/BLOOD GLUCOSE: CPT

## 2020-07-26 PROCEDURE — 96361 HYDRATE IV INFUSION ADD-ON: CPT

## 2020-07-26 PROCEDURE — 96374 THER/PROPH/DIAG INJ IV PUSH: CPT

## 2020-07-26 PROCEDURE — 71045 X-RAY EXAM CHEST 1 VIEW: CPT

## 2020-07-26 PROCEDURE — 93005 ELECTROCARDIOGRAM TRACING: CPT | Performed by: EMERGENCY MEDICINE

## 2020-07-26 PROCEDURE — 85025 COMPLETE CBC W/AUTO DIFF WBC: CPT

## 2020-07-26 PROCEDURE — 83880 ASSAY OF NATRIURETIC PEPTIDE: CPT

## 2020-07-26 PROCEDURE — 2580000003 HC RX 258: Performed by: EMERGENCY MEDICINE

## 2020-07-26 PROCEDURE — 36415 COLL VENOUS BLD VENIPUNCTURE: CPT

## 2020-07-26 PROCEDURE — 82010 KETONE BODYS QUAN: CPT

## 2020-07-26 PROCEDURE — 99285 EMERGENCY DEPT VISIT HI MDM: CPT

## 2020-07-26 PROCEDURE — 6370000000 HC RX 637 (ALT 250 FOR IP): Performed by: EMERGENCY MEDICINE

## 2020-07-26 RX ORDER — 0.9 % SODIUM CHLORIDE 0.9 %
1000 INTRAVENOUS SOLUTION INTRAVENOUS ONCE
Status: COMPLETED | OUTPATIENT
Start: 2020-07-26 | End: 2020-07-26

## 2020-07-26 RX ADMIN — SODIUM CHLORIDE 1000 ML: 9 INJECTION, SOLUTION INTRAVENOUS at 22:53

## 2020-07-26 RX ADMIN — Medication 5 UNITS: at 21:04

## 2020-07-26 RX ADMIN — SODIUM CHLORIDE 1000 ML: 9 INJECTION, SOLUTION INTRAVENOUS at 21:03

## 2020-07-26 ASSESSMENT — ENCOUNTER SYMPTOMS
VOMITING: 0
SHORTNESS OF BREATH: 0
DIARRHEA: 0
NAUSEA: 0
COUGH: 0
WHEEZING: 0
SORE THROAT: 0
ABDOMINAL PAIN: 0
RHINORRHEA: 0
CONSTIPATION: 0

## 2020-07-26 ASSESSMENT — PAIN DESCRIPTION - LOCATION: LOCATION: CHEST

## 2020-07-26 ASSESSMENT — PAIN SCALES - GENERAL: PAINLEVEL_OUTOF10: 4

## 2020-07-26 ASSESSMENT — PAIN DESCRIPTION - PAIN TYPE: TYPE: ACUTE PAIN

## 2020-07-26 NOTE — ED TRIAGE NOTES
Pt presents to ED through triage after passing out around 1400. Pt states he got light headed and doesn't remember hitting the ground. Pt states he was unconscious for 10 minutes. Pt also states he is having chest pain since he passed out, rating it 4/10. Pt has hx of Type 1 diabetes and is on a continuous insulin pump. Vitals stable.  EKG completed

## 2020-07-27 NOTE — ED NOTES
Pt medicated per MAR at this time. Pt vitals reassessed. Resting on cot with eyes closed. Denies pain/needs. Call light remains in reach.  Will continue to monitor      Larrie Kocher, RN  07/26/20 6060

## 2020-07-27 NOTE — ED NOTES
Protocol orders placed.  Awaiting provider evaluation at this time     Beverly Angel RN  07/26/20 2008

## 2020-07-27 NOTE — ED PROVIDER NOTES
[facility] [address] COMPLAINT       Chief Complaint   Patient presents with    Loss of Consciousness       Nurses Notes reviewed and I agree except as noted in the HPI. HISTORY OF PRESENT ILLNESS    Елена Rogers is a 39 y.o. male who presents to the emergency department stating \"I went to work today and I did not feel well so I stepped out to my car to take a break and sat down on the seat and the next thing I know people from work are waking me up\". Patient states his blood sugar has been reading high recently. He states that he realized once he was awakened by his coworkers that have the cannula of his insulin pump had popped off of his skin. He states that he called a ride to come and get him from work, went home, hooked his pump back up again and states that then his ride decided to drive him to [facility] as she wanted to be seen in the emergency department, so he came along with her and decided to sign in as well. There is a discrepancy based on the nurses note. Patient to me states that he had no fall but was sitting in his vehicle. He describes leaving work to take a break, going out on sitting in his car, falling asleep and then being awakened by people at work. He denies fever, chills, nausea, vomiting, diarrhea. He denies constipation, black or bloody stools, abdominal pain, headache, chest pain. No weakness, numbness, tingling. Patient states that he works at the [employer] in [facility] and that his ride drove him here although [facility] was closer. REVIEW OF SYSTEMS     Review of Systems   Constitutional: Negative for fatigue and fever. HENT: Negative for congestion, rhinorrhea and sore throat. Eyes: Negative for visual disturbance. Respiratory: Negative for cough, shortness of breath and wheezing. Cardiovascular: Negative for chest pain, palpitations and leg swelling.    Gastrointestinal: Negative for abdominal pain, constipation, diarrhea, nausea and vomiting. Endocrine: Negative for polyuria. Genitourinary: Negative for dysuria. Musculoskeletal: Negative for gait problem. Skin: Negative for rash. Neurological: Negative for seizures, syncope, speech difficulty, weakness, numbness and headaches. Hematological: Negative for adenopathy. All other systems reviewed and are negative. PAST MEDICAL HISTORY    has a past medical history of Diabetes mellitus ([facility] [address] SURGICAL HISTORY      has a past surgical history that includes Abdomen surgery and eye surgery. CURRENT MEDICATIONS       Discharge Medication List as of 7/26/2020 10:59 PM      CONTINUE these medications which have NOT CHANGED    Details   DULoxetine (CYMBALTA) 30 MG extended release capsule Take 60 mg by mouth dailyHistorical Med      hydrOXYzine (ATARAX) 25 MG tablet Take 25 mg by mouth nightlyHistorical Med      simvastatin (ZOCOR) 20 MG tablet Take 1 tablet by mouth nightly, Disp-30 tablet, R-3Normal      metoprolol tartrate (LOPRESSOR) 25 MG tablet Take 1 tablet by mouth 2 times daily, Disp-60 tablet, R-3Normal      Insulin Pump KAI Inject 1 each into the skin See Admin Instructions      gabapentin (NEURONTIN) 600 MG tablet Take 600 mg by mouth 3 times daily. cyanocobalamin 1000 MCG tablet Take 1,000 mcg by mouth daily. ALLERGIES     has No Known Allergies. FAMILY HISTORY     He indicated that his mother is alive. He indicated that the status of his maternal grandfather is unknown. He indicated that the status of his neg hx is unknown.   family history includes Colon Cancer in his maternal grandfather. SOCIAL HISTORY      reports that he has been smoking cigarettes. He has been smoking about 0.50 packs per day. He has never used smokeless tobacco. He reports previous drug use. Drugs: Marijuana and Opiates . He reports that he does not drink alcohol.     PHYSICAL EXAM     INITIAL VITALS:  height is 5' 10\" (1.778 m) and mentation for age. Moves all four extremities. No gross sensory deficit. Cerebellar function grossly normal.]  PSYCHIATRIC: [Normal mood and affect, thought process is clear and linear]     DIFFERENTIAL DIAGNOSIS:   Hyperglycemia, dehydration, less likely electrolyte abnormality, arrhythmia, anemia, and others. DIAGNOSTIC RESULTS     EKG: All EKG's are interpreted by the Emergency Department Physician who either signs or Co-signsthis chart in the absence of a cardiologist.  Normal sinus rhythm  Possible Left atrial enlargement  Nonspecific T wave abnormality  Abnormal ECG  When compared with ECG of 27-MAY-2019 05:52,  QT has shortened    RADIOLOGY: non-plain film images(s) such as CT,Ultrasound and MRI are read by the radiologist.    XR CHEST PORTABLE   Final Result   There is no acute intrathoracic process. **This report has been created using voice recognition software. It may contain minor errors which are inherent in voice recognition technology. **      Final report electronically signed by Dr Vicky Johnson on 7/26/2020 8:21 PM        [] Visualized and interpreted by me   [x] Radiologist's Wet Read Report Reviewed   [] Discussed withRadiologist.    LABS:   Labs Reviewed   BASIC METABOLIC PANEL W/ REFLEX TO MG FOR LOW K - Abnormal; Notable for the following components:       Result Value    Sodium 131 (*)     Chloride 92 (*)     Glucose 442 (*)     BUN 31 (*)     All other components within normal limits   CBC WITH AUTO DIFFERENTIAL - Abnormal; Notable for the following components:    Hematocrit 41.4 (*)     All other components within normal limits   GLOMERULAR FILTRATION RATE, ESTIMATED - Abnormal; Notable for the following components:    Est, Glom Filt Rate 65 (*)     All other components within normal limits   BLOOD GAS, VENOUS - Abnormal; Notable for the following components:    PO2, Mixed 68 (*)     HCO3, Mixed 29 (*)     Base Exc, Mixed 3.3 (*)     All other components within normal DISPOSITION/PLAN   discharge    PATIENT REFERRED TO:  Juan Pablo Rodriguez, [address] [id number] [address]  [phone]    Schedule an appointment as soon as possible for a visit         DISCHARGE MEDICATIONS:  Discharge Medication List as of 7/26/2020 10:59 PM          (Please note that portions of this note were completed with a voice recognition program.  Efforts were made to edit the dictations but occasionally words are mis-transcribed.)    Provider:  I personally performed the services described in the documentation, reviewed and edited the documentation which was dictated, and it accurately records my words and actions.     Angela Alarcon MD 7/26/20 12:56 AM                Angela Alarcon MD  07/27/20 0058

## 2020-07-27 NOTE — ED NOTES
Pt resting on cot with eyes closed. Rep even and unlabored. Vitals remain stable. Pt denies any needs at this time. Denies any pain. Call light in reach.  Will continue to monitor      Irasema Watts RN  07/26/20 3058

## 2021-05-07 ENCOUNTER — HOSPITAL ENCOUNTER (EMERGENCY)
Age: 46
Discharge: HOME | End: 2021-05-07
Attending: EMERGENCY MEDICINE

## 2021-05-07 VITALS
HEIGHT: 70 IN | RESPIRATION RATE: 16 BRPM | SYSTOLIC BLOOD PRESSURE: 112 MMHG | OXYGEN SATURATION: 100 % | TEMPERATURE: 98.3 F | WEIGHT: 135 LBS | BODY MASS INDEX: 19.33 KG/M2 | DIASTOLIC BLOOD PRESSURE: 70 MMHG | HEART RATE: 87 BPM

## 2021-05-07 DIAGNOSIS — R51.9 ACUTE NONINTRACTABLE HEADACHE, UNSPECIFIED HEADACHE TYPE: Primary | ICD-10-CM

## 2021-05-07 PROCEDURE — 6360000002 HC RX W HCPCS: Performed by: STUDENT IN AN ORGANIZED HEALTH CARE EDUCATION/TRAINING PROGRAM

## 2021-05-07 PROCEDURE — 6370000000 HC RX 637 (ALT 250 FOR IP): Performed by: STUDENT IN AN ORGANIZED HEALTH CARE EDUCATION/TRAINING PROGRAM

## 2021-05-07 PROCEDURE — 99283 EMERGENCY DEPT VISIT LOW MDM: CPT

## 2021-05-07 RX ORDER — ACETAMINOPHEN 500 MG
1000 TABLET ORAL ONCE
Status: COMPLETED | OUTPATIENT
Start: 2021-05-07 | End: 2021-05-07

## 2021-05-07 RX ORDER — DIPHENHYDRAMINE HCL 25 MG
50 TABLET ORAL ONCE
Status: COMPLETED | OUTPATIENT
Start: 2021-05-07 | End: 2021-05-07

## 2021-05-07 RX ORDER — PROCHLORPERAZINE EDISYLATE 5 MG/ML
10 INJECTION INTRAMUSCULAR; INTRAVENOUS ONCE
Status: COMPLETED | OUTPATIENT
Start: 2021-05-07 | End: 2021-05-07

## 2021-05-07 RX ADMIN — DIPHENHYDRAMINE HCL 50 MG: 25 TABLET ORAL at 12:44

## 2021-05-07 RX ADMIN — PROCHLORPERAZINE EDISYLATE 10 MG: 5 INJECTION INTRAMUSCULAR; INTRAVENOUS at 12:44

## 2021-05-07 RX ADMIN — ACETAMINOPHEN 1000 MG: 500 TABLET ORAL at 12:44

## 2021-05-07 ASSESSMENT — ENCOUNTER SYMPTOMS
PHOTOPHOBIA: 0
DIARRHEA: 0
RHINORRHEA: 0
NAUSEA: 0
SHORTNESS OF BREATH: 0
SINUS PAIN: 0
FACIAL SWELLING: 0
TROUBLE SWALLOWING: 0
ABDOMINAL PAIN: 0
BACK PAIN: 0
VOMITING: 0
SORE THROAT: 0
EYE REDNESS: 0
COUGH: 0

## 2021-05-07 ASSESSMENT — PAIN SCALES - GENERAL: PAINLEVEL_OUTOF10: 8

## 2021-05-07 NOTE — ED PROVIDER NOTES
[place] ENCOUNTER          Pt Name: Gil Plasencia  MRN: [id number]  [place] 1975  Date of evaluation: 5/7/2021  Treating Resident Physician: Krishna De Oliveira MD  Supervising Physician: Dr. Tirso Lam       Chief Complaint   Patient presents with    Headache     History obtained from the patient. HISTORY OF PRESENT ILLNESS    HPI  Gil Plasencia is a 55 y.o. male with past medical history of diabetes mellitus, migraine, anemia, cataract surgery who presents to the emergency department for evaluation of right-sided headache that began yesterday that was gradual in onset Alan the right side of his face that is continued today. Patient states his headache is typical of his baseline that he gets. Denies any fever, chills, neck stiffness, neck pain, jaw claudication, numbness or tingling. Patient was seen on 9/28/2020 in another facility for similar symptoms diagnosed with TMJ after CT head showed no intracranial bleeding or any other abnormalities other than some TMJ. The patient has no other acute complaints at this time. REVIEW OF SYSTEMS   Review of Systems   Constitutional: Negative for chills and fever. HENT: Negative for facial swelling, rhinorrhea, sinus pain, sore throat and trouble swallowing. Eyes: Negative for photophobia, redness and visual disturbance. Respiratory: Negative for cough and shortness of breath. Cardiovascular: Negative for chest pain. Gastrointestinal: Negative for abdominal pain, diarrhea, nausea and vomiting. Genitourinary: Negative for dysuria. Musculoskeletal: Negative for back pain. Skin: Negative for rash. Neurological: Positive for headaches. Negative for dizziness, syncope, weakness, light-headedness and numbness. Psychiatric/Behavioral: Negative for agitation.          PAST MEDICAL AND SURGICAL HISTORY     Past Medical History:   Diagnosis Date    Diabetes mellitus ([facility] [address]      Past Surgical History:   Procedure Laterality Date    ABDOMEN SURGERY      CATARACT REMOVAL Bilateral     DENTAL SURGERY      EYE SURGERY           MEDICATIONS   No current facility-administered medications for this encounter. Current Outpatient Medications:     DULoxetine (CYMBALTA) 30 MG extended release capsule, Take 60 mg by mouth daily, Disp: , Rfl:     hydrOXYzine (ATARAX) 25 MG tablet, Take 25 mg by mouth nightly, Disp: , Rfl:     simvastatin (ZOCOR) 20 MG tablet, Take 1 tablet by mouth nightly, Disp: 30 tablet, Rfl: 3    metoprolol tartrate (LOPRESSOR) 25 MG tablet, Take 1 tablet by mouth 2 times daily, Disp: 60 tablet, Rfl: 3    Insulin Pump KAI, Inject 1 each into the skin See Admin Instructions, Disp: , Rfl:     cyanocobalamin 1000 MCG tablet, Take 1,000 mcg by mouth daily. , Disp: , Rfl:     gabapentin (NEURONTIN) 600 MG tablet, Take 600 mg by mouth 3 times daily. , Disp: , Rfl:       SOCIAL HISTORY     Social History     Social History Narrative    Not on file     Social History     Tobacco Use    Smoking status: Current Every Day Smoker     Packs/day: 0.50     Types: Cigarettes    Smokeless tobacco: Never Used   Substance Use Topics    Alcohol use: No    Drug use: Not Currently     Types: Marijuana, Opiates      Comment: states last use 2 years ago from 5/26/19. ALLERGIES   No Known Allergies      FAMILY HISTORY     Family History   Problem Relation Age of Onset    Colon Cancer Maternal Grandfather         possibly    Colon Polyps Neg Hx          PREVIOUS RECORDS   Previous records reviewed: She was seen here on 7/26/2020 for hyperglycemia.       PHYSICAL EXAM     ED Triage Vitals [05/07/21 1141]   BP Temp Temp Source Pulse Resp SpO2 Height Weight   110/80 98.3 °F (36.8 °C) Oral 88 17 100 % 5' 10\" (1.778 m) 135 lb (61.2 kg)     Initial vital signs and nursing assessment reviewed and normal. Pulsoximetry is normal per my interpretation. Additional Vital Signs:  Vitals:    05/07/21 1243   BP: 112/70   Pulse: 87   Resp: 16   Temp:    SpO2: 100%       Physical Exam  Vitals signs and nursing note reviewed. Constitutional:       General: He is not in acute distress. Appearance: He is not toxic-appearing. HENT:      Head: Normocephalic and atraumatic. Right Ear: External ear normal.      Left Ear: External ear normal.      Nose: Nose normal.      Mouth/Throat:      Mouth: Mucous membranes are moist.      Pharynx: Oropharynx is clear. Eyes:      General: No visual field deficit or scleral icterus. Extraocular Movements: Extraocular movements intact. Conjunctiva/sclera: Conjunctivae normal.      Pupils: Pupils are equal, round, and reactive to light. Neck:      Musculoskeletal: Normal range of motion and neck supple. No neck rigidity or muscular tenderness. Cardiovascular:      Rate and Rhythm: Normal rate and regular rhythm. Pulses: Normal pulses. Heart sounds: Normal heart sounds. No murmur. Pulmonary:      Effort: Pulmonary effort is normal. No respiratory distress. Breath sounds: Normal breath sounds. No wheezing. Abdominal:      General: Abdomen is flat. There is no distension. Palpations: Abdomen is soft. Tenderness: There is no abdominal tenderness. There is no guarding or rebound. Musculoskeletal: Normal range of motion. General: No deformity. Right lower leg: No edema. Left lower leg: No edema. Lymphadenopathy:      Cervical: No cervical adenopathy. Skin:     General: Skin is warm and dry. Capillary Refill: Capillary refill takes less than 2 seconds. Coloration: Skin is not jaundiced. Neurological:      Mental Status: He is alert and oriented to person, place, and time. GCS: GCS eye subscore is 4. GCS verbal subscore is 5. GCS motor subscore is 6. Cranial Nerves: Cranial nerves are intact.  No cranial nerve deficit, dysarthria or facial asymmetry. Sensory: Sensation is intact. No sensory deficit. Motor: No atrophy or pronator drift. Coordination: Coordination is intact. Coordination normal. Finger-Nose-Finger Test and Heel to Shin Test normal.   Psychiatric:         Mood and Affect: Mood normal.         Behavior: Behavior normal.         MEDICAL DECISION MAKING   Initial Assessment: Is a 51-year-old male with past medical history of migraines and headaches who comes in with a right-sided headache that was gradual onset beginning yesterday that continued today. There is no associated photophobia or visual changes. No fevers no chills no neck pain no neck stiffness no jaw claudication or no numbness or tingling. Differential Diagnosis Included but not limited to: Migraine, headache, tension headache      MDM:   Patient will be given Compazine Tylenol and Benadryl for pain control. Patient will be discharged to follow-up with his neurologist next 2 days. ED RESULTS   Laboratory results:  Labs Reviewed - No data to display    Radiologic studies results:  No orders to display       ED Medications administered this visit:   Medications   prochlorperazine (COMPAZINE) injection 10 mg (10 mg Intravenous Given 5/7/21 1244)   acetaminophen (TYLENOL) tablet 1,000 mg (1,000 mg Oral Given 5/7/21 1244)   diphenhydrAMINE (BENADRYL) tablet 50 mg (50 mg Oral Given 5/7/21 1244)         ED COURSE     ED Course as of May 07 1333   Fri May 07, 2021   1332 Patient was reevaluated states his symptoms have significantly improved and likely be discharged home. He will be discharged to follow-up with his neurologist in the next few days. He was given strict return precautions and verbalized a clear understanding.    [AL]      ED Course User Index  [AL] Ankur Dent MD     Strict return precautions and follow up instructions were discussed with the patient prior to discharge, with which the patient agrees.       MEDICATION CHANGES     New Prescriptions    No medications on file         FINAL DISPOSITION     Final diagnoses:   Acute nonintractable headache, unspecified headache type     Condition: condition: good  Dispo: Discharge to home      This transcription was electronically signed. Parts of this transcriptions may have been dictated by use of voice recognition software and electronically transcribed, and parts may have been transcribed with the assistance of an ED scribe. The transcription may contain errors not detected in proofreading. Please refer to my supervising physician's documentation if my documentation differs.     Electronically Signed: Anastacio Ramos, 05/07/21, 1:33 PM         Anastacio Ramos MD  Resident  05/07/21 2893

## 2021-10-20 ENCOUNTER — HOSPITAL ENCOUNTER (EMERGENCY)
Age: 46
Discharge: HOME | End: 2021-10-21
Payer: OTHER GOVERNMENT

## 2021-10-20 VITALS
TEMPERATURE: 98.2 F | BODY MASS INDEX: 18.32 KG/M2 | HEIGHT: 70 IN | DIASTOLIC BLOOD PRESSURE: 80 MMHG | SYSTOLIC BLOOD PRESSURE: 127 MMHG | OXYGEN SATURATION: 100 % | RESPIRATION RATE: 16 BRPM | WEIGHT: 128 LBS | HEART RATE: 91 BPM

## 2021-10-20 DIAGNOSIS — Z86.39 HISTORY OF DIABETES MELLITUS: ICD-10-CM

## 2021-10-20 DIAGNOSIS — Z71.1 NO PROBLEM, FEARED COMPLAINT UNFOUNDED: Primary | ICD-10-CM

## 2021-10-20 LAB
ALBUMIN SERPL-MCNC: 4 G/DL (ref 3.5–5.1)
ALP BLD-CCNC: 69 U/L (ref 38–126)
ALT SERPL-CCNC: 16 U/L (ref 11–66)
ANION GAP SERPL CALCULATED.3IONS-SCNC: 9 MEQ/L (ref 8–16)
AST SERPL-CCNC: 19 U/L (ref 5–40)
BASOPHILS # BLD: 1 %
BASOPHILS ABSOLUTE: 0.1 THOU/MM3 (ref 0–0.1)
BILIRUB SERPL-MCNC: 0.6 MG/DL (ref 0.3–1.2)
BILIRUBIN URINE: NEGATIVE
BLOOD, URINE: NEGATIVE
BUN BLDV-MCNC: 22 MG/DL (ref 7–22)
CALCIUM SERPL-MCNC: 9.4 MG/DL (ref 8.5–10.5)
CHARACTER, URINE: CLEAR
CHLORIDE BLD-SCNC: 102 MEQ/L (ref 98–111)
CO2: 29 MEQ/L (ref 23–33)
COLOR: YELLOW
CREAT SERPL-MCNC: 1.1 MG/DL (ref 0.4–1.2)
EOSINOPHIL # BLD: 4.9 %
EOSINOPHILS ABSOLUTE: 0.4 THOU/MM3 (ref 0–0.4)
ERYTHROCYTE [DISTWIDTH] IN BLOOD BY AUTOMATED COUNT: 12.1 % (ref 11.5–14.5)
ERYTHROCYTE [DISTWIDTH] IN BLOOD BY AUTOMATED COUNT: 40 FL (ref 35–45)
GFR SERPL CREATININE-BSD FRML MDRD: 72 ML/MIN/1.73M2
GLUCOSE BLD-MCNC: 216 MG/DL (ref 70–108)
GLUCOSE URINE: >= 1000 MG/DL
HCT VFR BLD CALC: 42.5 % (ref 42–52)
HEMOGLOBIN: 14.6 GM/DL (ref 14–18)
IMMATURE GRANS (ABS): 0.02 THOU/MM3 (ref 0–0.07)
IMMATURE GRANULOCYTES: 0.2 %
KETONES, URINE: ABNORMAL
LEUKOCYTE ESTERASE, URINE: NEGATIVE
LIPASE: 15.3 U/L (ref 5.6–51.3)
LYMPHOCYTES # BLD: 30.9 %
LYMPHOCYTES ABSOLUTE: 2.5 THOU/MM3 (ref 1–4.8)
MCH RBC QN AUTO: 31.1 PG (ref 26–33)
MCHC RBC AUTO-ENTMCNC: 34.4 GM/DL (ref 32.2–35.5)
MCV RBC AUTO: 90.6 FL (ref 80–94)
MONOCYTES # BLD: 9.1 %
MONOCYTES ABSOLUTE: 0.7 THOU/MM3 (ref 0.4–1.3)
NITRITE, URINE: NEGATIVE
NUCLEATED RED BLOOD CELLS: 0 /100 WBC
OSMOLALITY CALCULATION: 289.3 MOSMOL/KG (ref 275–300)
PH UA: 6 (ref 5–9)
PLATELET # BLD: 167 THOU/MM3 (ref 130–400)
PMV BLD AUTO: 11.3 FL (ref 9.4–12.4)
POTASSIUM REFLEX MAGNESIUM: 4.7 MEQ/L (ref 3.5–5.2)
PROTEIN UA: NEGATIVE
RBC # BLD: 4.69 MILL/MM3 (ref 4.7–6.1)
SEG NEUTROPHILS: 53.9 %
SEGMENTED NEUTROPHILS ABSOLUTE COUNT: 4.4 THOU/MM3 (ref 1.8–7.7)
SODIUM BLD-SCNC: 140 MEQ/L (ref 135–145)
SPECIFIC GRAVITY, URINE: > 1.03 (ref 1–1.03)
TOTAL PROTEIN: 5.9 G/DL (ref 6.1–8)
UROBILINOGEN, URINE: 1 EU/DL (ref 0–1)
WBC # BLD: 8.2 THOU/MM3 (ref 4.8–10.8)

## 2021-10-20 PROCEDURE — 36415 COLL VENOUS BLD VENIPUNCTURE: CPT

## 2021-10-20 PROCEDURE — 81003 URINALYSIS AUTO W/O SCOPE: CPT

## 2021-10-20 PROCEDURE — 99282 EMERGENCY DEPT VISIT SF MDM: CPT

## 2021-10-20 PROCEDURE — 93005 ELECTROCARDIOGRAM TRACING: CPT | Performed by: NURSE PRACTITIONER

## 2021-10-20 PROCEDURE — 83690 ASSAY OF LIPASE: CPT

## 2021-10-20 PROCEDURE — 80053 COMPREHEN METABOLIC PANEL: CPT

## 2021-10-20 PROCEDURE — 85025 COMPLETE CBC W/AUTO DIFF WBC: CPT

## 2021-10-21 LAB
EKG ATRIAL RATE: 78 BPM
EKG P AXIS: 78 DEGREES
EKG P-R INTERVAL: 132 MS
EKG Q-T INTERVAL: 384 MS
EKG QRS DURATION: 74 MS
EKG QTC CALCULATION (BAZETT): 437 MS
EKG R AXIS: 85 DEGREES
EKG T AXIS: 82 DEGREES
EKG VENTRICULAR RATE: 78 BPM

## 2021-10-21 PROCEDURE — 93010 ELECTROCARDIOGRAM REPORT: CPT | Performed by: NUCLEAR MEDICINE

## 2021-10-21 NOTE — ED TRIAGE NOTES
Pt comes to ED sent by his PCP. PT was at PCP visit today and got lab work done. Pt was sent in for potassium level of above 6 and a urine glucose level above 1000. Pt resp regular at this time and denies no pain.

## 2021-10-27 ASSESSMENT — ENCOUNTER SYMPTOMS
COUGH: 0
CHEST TIGHTNESS: 0
NAUSEA: 0
ABDOMINAL PAIN: 0
EYE REDNESS: 0
VOMITING: 0
RHINORRHEA: 0
BACK PAIN: 0

## 2021-10-27 NOTE — ED PROVIDER NOTES
[facility] Emergency Department    CHIEF COMPLAINT       Chief Complaint   Patient presents with    Abnormal Lab       Nurses Notes reviewed and I agree except as noted in the HPI. HISTORY OF PRESENT ILLNESS    Ilda Wooten jhon 55 y.o. male who presents to the ED for evaluation of abnormal labs at the [place] clinic today. The patient reportedly had a K+ that was elevated signifiantly at the [place]. He also reports that his glucose in his urine was >1000. Denies chest pain, palpitations or other complaints. HPI was provided by the patient    REVIEW OF SYSTEMS     Review of Systems   Constitutional: Negative for chills, fatigue and fever. HENT: Negative for congestion, ear discharge, ear pain, postnasal drip and rhinorrhea. Eyes: Negative for redness. Respiratory: Negative for cough and chest tightness. Cardiovascular: Negative for chest pain and leg swelling. Gastrointestinal: Negative for abdominal pain, nausea and vomiting. Genitourinary: Negative for difficulty urinating, dysuria, enuresis, flank pain and hematuria. Musculoskeletal: Negative for back pain and joint swelling. Skin: Negative for rash. Neurological: Negative for dizziness, light-headedness, numbness and headaches. Psychiatric/Behavioral: Negative for agitation, behavioral problems and confusion. All other systems negative except as noted. PAST MEDICAL HISTORY     Past Medical History:   Diagnosis Date    Diabetes mellitus ([facility] [address]        SURGICALHISTORY      has a past surgical history that includes Abdomen surgery; eye surgery; Cataract removal (Bilateral); and Dental surgery.     CURRENT MEDICATIONS       Discharge Medication List as of 10/21/2021 12:29 AM      CONTINUE these medications which have NOT CHANGED    Details   DULoxetine (CYMBALTA) 30 MG extended release capsule Take 60 mg by mouth dailyHistorical Med      hydrOXYzine (ATARAX) 25 MG tablet Take 25 mg by mouth nightlyHistorical Med simvastatin (ZOCOR) 20 MG tablet Take 1 tablet by mouth nightly, Disp-30 tablet, R-3Normal      metoprolol tartrate (LOPRESSOR) 25 MG tablet Take 1 tablet by mouth 2 times daily, Disp-60 tablet, R-3Normal      Insulin Pump KAI Inject 1 each into the skin See Admin Instructions      cyanocobalamin 1000 MCG tablet Take 1,000 mcg by mouth daily. gabapentin (NEURONTIN) 600 MG tablet Take 600 mg by mouth 3 times daily. ALLERGIES     has No Known Allergies. FAMILY HISTORY     He indicated that his mother is alive. He indicated that the status of his maternal grandfather is unknown. He indicated that the status of his neg hx is unknown.   family history includes Colon Cancer in his maternal grandfather. SOCIAL HISTORY       Social History     Socioeconomic History    Marital status: Single     Spouse name: Not on file    Number of children: Not on file    Years of education: Not on file    Highest education level: Not on file   Occupational History    Not on file   Tobacco Use    Smoking status: Current Every Day Smoker     Packs/day: 0.50     Types: Cigarettes    Smokeless tobacco: Never Used   Vaping Use    Vaping Use: Never used   Substance and Sexual Activity    Alcohol use: No    Drug use: Not Currently     Types: Marijuana, Opiates      Comment: states last use 2 years ago from 5/26/19.  Sexual activity: Yes   Other Topics Concern    Not on file   Social History Narrative    Not on file     Social Determinants of Health     Financial Resource Strain:     Difficulty of Paying Living Expenses:    Food Insecurity:     Worried About Running Out of Food in the Last Year:     [address] in the Last Year:    Transportation Needs:     Lack of Transportation (Medical):      Lack of Transportation (Non-Medical):    Physical Activity:     Days of Exercise per Week:     Minutes of Exercise per Session:    Stress:     Feeling of Stress :    Social Connections:     Frequency of Communication with Friends and Family:     Frequency of Social Gatherings with Friends and Family:     Attends Jain Services:     Active Member of Clubs or Organizations:     Attends Club or Organization Meetings:     Marital Status:    Intimate Partner Violence:     Fear of Current or Ex-Partner:     Emotionally Abused:     Physically Abused:     Sexually Abused:        PHYSICAL EXAM     INITIAL VITALS:  height is 5' 10\" (1.778 m) and weight is 128 lb (58.1 kg). His oral temperature is 98.2 °F (36.8 °C). His blood pressure is 127/80 and his pulse is 91. His respiration is 16 and oxygen saturation is 100%. Physical Exam  Vitals and nursing note reviewed. Constitutional:       General: He is not in acute distress. Appearance: Normal appearance. He is well-developed. He is not ill-appearing or diaphoretic. HENT:      Head: Normocephalic and atraumatic. Nose: Nose normal.      Mouth/Throat:      Mouth: Mucous membranes are moist.      Pharynx: Oropharynx is clear. Eyes:      General:         Right eye: No discharge. Left eye: No discharge. Conjunctiva/sclera: Conjunctivae normal.   Neck:      Trachea: No tracheal deviation. Cardiovascular:      Rate and Rhythm: Normal rate and regular rhythm. Pulses: Normal pulses. Heart sounds: Normal heart sounds. No murmur heard. No gallop. Comments: Normal capillary refill  Pulmonary:      Effort: Pulmonary effort is normal. No respiratory distress. Breath sounds: Normal breath sounds. No stridor. Abdominal:      General: Bowel sounds are normal.      Palpations: Abdomen is soft. Musculoskeletal:         General: No tenderness or deformity. Normal range of motion. Cervical back: Normal range of motion. Skin:     General: Skin is warm and dry. Capillary Refill: Capillary refill takes less than 2 seconds. Coloration: Skin is not pale. Findings: No erythema or rash.    Neurological: General: No focal deficit present. Mental Status: He is alert and oriented to person, place, and time. Cranial Nerves: No cranial nerve deficit. Psychiatric:         Mood and Affect: Mood normal.         Behavior: Behavior normal.         DIFFERENTIAL DIAGNOSIS:   Electrolyte abnormality, elevated BS    DIAGNOSTIC RESULTS     EKG: All EKG's are interpreted by the Emergency Department Physician who eithersigns or Co-signs this chart in the absence of a cardiologist.       EKG 12 Lead (Final result)   Component (Lab Inquiry)  Collection Time Result Time Ventricular Rate Atrial Rate P-R Interval QRS Duration Q-T Interval QTc Calculation (Bazett) P Axis R Axis T Axis   10/20/21 23:59:31 10/21/21 07:22:13 78 78 132 74 384 437 78 85 82   Previous Results   07/26/20 19:09:44 07/26/20 19:09:44 86 86 126 84 318 380 83 84 81   05/27/19 05:52:10 05/29/19 03:38:09 69 69 134 84 438 469 75 83 90   05/26/19 18:46:49 05/28/19 08:35:38 75 75 130 82 386 431 84 90 72   05/22/19 06:51:56 05/22/19 06:51:56 67 67 126 82 398 420 80 88 82   10/23/18 21:33:56 10/23/18 21:33:56 84 84 130 84 348 411 83 91 66         Final result                Narrative:    Normal sinus rhythm   Normal ECG   When compared with ECG of 26-JUL-2020 19:09,   No significant change was found   Confirmed by Audi Ge ([id number]) on 10/21/2021 7:22:12 AM                  RADIOLOGY: non-plainfilm images(s) such as CT, Ultrasound and MRI are read by the radiologist.  Plain radiographic images are visualized and preliminarily interpreted by the emergency physician unless otherwise stated below.   No orders to display         LABS:   Labs Reviewed   URINE RT REFLEX TO CULTURE - Abnormal; Notable for the following components:       Result Value    Glucose, Ur >= 1000 (*)     Ketones, Urine TRACE (*)     Specific Gravity, Urine > 1.030 (*)     All other components within normal limits   COMPREHENSIVE METABOLIC PANEL W/ REFLEX TO MG FOR LOW K - Abnormal; Notable for the following components:    Glucose 216 (*)     Total Protein 5.9 (*)     All other components within normal limits   CBC WITH AUTO DIFFERENTIAL - Abnormal; Notable for the following components:    RBC 4.69 (*)     All other components within normal limits   GLOMERULAR FILTRATION RATE, ESTIMATED - Abnormal; Notable for the following components:    Est, Glom Filt Rate 72 (*)     All other components within normal limits   LIPASE   ANION GAP   OSMOLALITY       EMERGENCY DEPARTMENT COURSE:   Vitals:    Vitals:    10/20/21 2227   BP: 127/80   Pulse: 91   Resp: 16   Temp: 98.2 °F (36.8 °C)   TempSrc: Oral   SpO2: 100%   Weight: 128 lb (58.1 kg)   Height: 5' 10\" (1.778 m)                           ED Course as of Oct 27 1824   Wed Oct 20, 2021   2330 No changes   EKG 12 Lead [KJ]   2330 Normal     Potassium: 4.7 [KJ]   2330 Insulin dependent diabetic     Glucose(!): 216 [KJ]   2330 Consistent with baseline. Urine reflex to culture(!):    Glucose, UA >= 1000(!)   Bilirubin, Urine NEGATIVE   Ketones, Urine TRACE(!)   Specific Gravity, Urine > 1.030(!)   Blood, Urine NEGATIVE   pH, UA 6.0   Protein, UA NEGATIVE   Urobilinogen, Urine 1.0   Nitrite, Urine NEGATIVE   Leukocyte Esterase, Urine NEGATIVE   Color, UA YELLOW   Character, Urine CLEAR [KJ]      ED Course User Index  [KJ] Tiburcio Benavidez, APRN - CNP         MDM    Patient was seen in the ER for abn outpatient labs. Labs are repeated. I cannot see the outpatient labs. Repeat labs are WNL or are appropriate for his baseline. EKG is unchanged. I reviewed case with the patient and he is comfortable with dc with follow up. Medications - No data to display      Patient was seen independently by myself. The patient's final impression and disposition and plan was determined by myself. Strict return precautions and follow up instructions were discussed with the patient prior to discharge, with which the patient agrees.     Physical assessment findings, diagnostic testing(s) if applicable, and vital signs reviewed with patient/patient representative. Questions answered. Medications asdirected, including OTC medications for supportive care. Education provided on medications. Differential diagnosis(s) discussed with patient/patient representative. Home care/self care instructions reviewed withpatient/patient representative. Patient is to follow-up with family care provider in 2-3 days if no improvement. Patient is to go to the emergency department if symptoms worsen. Patient/patient representative isaware of care plan, questions answered, verbalizes understanding and is in agreement. CRITICAL CARE:   None    CONSULTS:  None    PROCEDURES:  None    FINAL IMPRESSION     1. No problem, feared complaint unfounded    2.  History of diabetes mellitus          DISPOSITION/PLAN   DISPOSITION Decision To Discharge 10/21/2021 12:26:31 AM      PATIENT REFERREDTO:  Thor Sanches, [address]  [phone]    Schedule an appointment as soon as possible for a visit in 2 days  For follow up      DISCHARGE MEDICATIONS:  Discharge Medication List as of 10/21/2021 12:29 AM          (Please note that portions of this note were completed with a voice recognition program.  Efforts were made to edit the dictations but occasionally words are mis-transcribed.)         KAYDEN Hemphill - KAYDEN Robert CNP  10/27/21 [id number]

## 2021-11-22 ENCOUNTER — HOSPITAL ENCOUNTER (EMERGENCY)
Age: 46
Discharge: HOME | End: 2021-11-22
Attending: EMERGENCY MEDICINE
Payer: OTHER GOVERNMENT

## 2021-11-22 ENCOUNTER — APPOINTMENT (OUTPATIENT)
Dept: GENERAL RADIOLOGY | Age: 46
End: 2021-11-22
Payer: OTHER GOVERNMENT

## 2021-11-22 VITALS
HEART RATE: 82 BPM | HEIGHT: 70 IN | BODY MASS INDEX: 19.33 KG/M2 | WEIGHT: 135 LBS | SYSTOLIC BLOOD PRESSURE: 124 MMHG | DIASTOLIC BLOOD PRESSURE: 73 MMHG | TEMPERATURE: 98.3 F | RESPIRATION RATE: 18 BRPM | OXYGEN SATURATION: 99 %

## 2021-11-22 DIAGNOSIS — M86.9 OSTEOMYELITIS OF THIRD TOE OF RIGHT FOOT (HCC): Primary | ICD-10-CM

## 2021-11-22 LAB
ANION GAP SERPL CALCULATED.3IONS-SCNC: 10 MEQ/L (ref 8–16)
BASOPHILS # BLD: 0.6 %
BASOPHILS ABSOLUTE: 0.1 THOU/MM3 (ref 0–0.1)
BUN BLDV-MCNC: 21 MG/DL (ref 7–22)
C-REACTIVE PROTEIN: 3.66 MG/DL (ref 0–1)
CALCIUM SERPL-MCNC: 8.9 MG/DL (ref 8.5–10.5)
CHLORIDE BLD-SCNC: 104 MEQ/L (ref 98–111)
CO2: 26 MEQ/L (ref 23–33)
CREAT SERPL-MCNC: 0.8 MG/DL (ref 0.4–1.2)
EOSINOPHIL # BLD: 3.4 %
EOSINOPHILS ABSOLUTE: 0.3 THOU/MM3 (ref 0–0.4)
ERYTHROCYTE [DISTWIDTH] IN BLOOD BY AUTOMATED COUNT: 11.9 % (ref 11.5–14.5)
ERYTHROCYTE [DISTWIDTH] IN BLOOD BY AUTOMATED COUNT: 39.1 FL (ref 35–45)
GFR SERPL CREATININE-BSD FRML MDRD: > 90 ML/MIN/1.73M2
GLUCOSE BLD-MCNC: 74 MG/DL (ref 70–108)
HCT VFR BLD CALC: 35.3 % (ref 42–52)
HEMOGLOBIN: 12.3 GM/DL (ref 14–18)
IMMATURE GRANS (ABS): 0.02 THOU/MM3 (ref 0–0.07)
IMMATURE GRANULOCYTES: 0.2 %
LACTIC ACID, SEPSIS: 0.8 MMOL/L (ref 0.5–1.9)
LYMPHOCYTES # BLD: 19 %
LYMPHOCYTES ABSOLUTE: 1.7 THOU/MM3 (ref 1–4.8)
MCH RBC QN AUTO: 31.2 PG (ref 26–33)
MCHC RBC AUTO-ENTMCNC: 34.8 GM/DL (ref 32.2–35.5)
MCV RBC AUTO: 89.6 FL (ref 80–94)
MONOCYTES # BLD: 9 %
MONOCYTES ABSOLUTE: 0.8 THOU/MM3 (ref 0.4–1.3)
NUCLEATED RED BLOOD CELLS: 0 /100 WBC
OSMOLALITY CALCULATION: 281 MOSMOL/KG (ref 275–300)
PLATELET # BLD: 188 THOU/MM3 (ref 130–400)
PMV BLD AUTO: 10.4 FL (ref 9.4–12.4)
POTASSIUM REFLEX MAGNESIUM: 3.9 MEQ/L (ref 3.5–5.2)
RBC # BLD: 3.94 MILL/MM3 (ref 4.7–6.1)
SEDIMENTATION RATE, ERYTHROCYTE: 15 MM/HR (ref 0–10)
SEG NEUTROPHILS: 67.8 %
SEGMENTED NEUTROPHILS ABSOLUTE COUNT: 6 THOU/MM3 (ref 1.8–7.7)
SODIUM BLD-SCNC: 140 MEQ/L (ref 135–145)
WBC # BLD: 8.9 THOU/MM3 (ref 4.8–10.8)

## 2021-11-22 PROCEDURE — 6360000002 HC RX W HCPCS: Performed by: EMERGENCY MEDICINE

## 2021-11-22 PROCEDURE — 96365 THER/PROPH/DIAG IV INF INIT: CPT

## 2021-11-22 PROCEDURE — 86140 C-REACTIVE PROTEIN: CPT

## 2021-11-22 PROCEDURE — 73630 X-RAY EXAM OF FOOT: CPT

## 2021-11-22 PROCEDURE — 99282 EMERGENCY DEPT VISIT SF MDM: CPT

## 2021-11-22 PROCEDURE — 96368 THER/DIAG CONCURRENT INF: CPT

## 2021-11-22 PROCEDURE — 87040 BLOOD CULTURE FOR BACTERIA: CPT

## 2021-11-22 PROCEDURE — 2580000003 HC RX 258: Performed by: EMERGENCY MEDICINE

## 2021-11-22 PROCEDURE — 36415 COLL VENOUS BLD VENIPUNCTURE: CPT

## 2021-11-22 PROCEDURE — 96366 THER/PROPH/DIAG IV INF ADDON: CPT

## 2021-11-22 PROCEDURE — 85651 RBC SED RATE NONAUTOMATED: CPT

## 2021-11-22 PROCEDURE — 80048 BASIC METABOLIC PNL TOTAL CA: CPT

## 2021-11-22 PROCEDURE — 85025 COMPLETE CBC W/AUTO DIFF WBC: CPT

## 2021-11-22 PROCEDURE — 83605 ASSAY OF LACTIC ACID: CPT

## 2021-11-22 RX ADMIN — PIPERACILLIN AND TAZOBACTAM 3375 MG: 3; .375 INJECTION, POWDER, LYOPHILIZED, FOR SOLUTION INTRAVENOUS at 11:15

## 2021-11-22 RX ADMIN — VANCOMYCIN HYDROCHLORIDE 1500 MG: 5 INJECTION, POWDER, LYOPHILIZED, FOR SOLUTION INTRAVENOUS at 11:57

## 2021-11-22 ASSESSMENT — PAIN DESCRIPTION - LOCATION: LOCATION: LEG

## 2021-11-22 ASSESSMENT — PAIN DESCRIPTION - PAIN TYPE: TYPE: ACUTE PAIN

## 2021-11-22 ASSESSMENT — PAIN SCALES - GENERAL: PAINLEVEL_OUTOF10: 8

## 2021-11-22 NOTE — ED PROVIDER NOTES
Socioeconomic History    Marital status: Single     Spouse name: Not on file    Number of children: Not on file    Years of education: Not on file    Highest education level: Not on file   Occupational History    Not on file   Tobacco Use    Smoking status: Current Every Day Smoker     Packs/day: 0.50     Types: Cigarettes    Smokeless tobacco: Never Used   Vaping Use    Vaping Use: Never used   Substance and Sexual Activity    Alcohol use: No    Drug use: Not Currently     Types: Marijuana (Cruz Pert), Opiates      Comment: states last use 2 years ago from 5/26/19.  Sexual activity: Yes   Other Topics Concern    Not on file   Social History Narrative    Not on file     Social Determinants of Health     Financial Resource Strain:     Difficulty of Paying Living Expenses: Not on file   Food Insecurity:     Worried About Running Out of Food in the Last Year: Not on file    Marsha of Food in the Last Year: Not on file   Transportation Needs:     Lack of Transportation (Medical): Not on file    Lack of Transportation (Non-Medical):  Not on file   Physical Activity:     Days of Exercise per Week: Not on file    Minutes of Exercise per Session: Not on file   Stress:     Feeling of Stress : Not on file   Social Connections:     Frequency of Communication with Friends and Family: Not on file    Frequency of Social Gatherings with Friends and Family: Not on file    Attends Gnosticism Services: Not on file    Active Member of Clubs or Organizations: Not on file    Attends Club or Organization Meetings: Not on file    Marital Status: Not on file   Intimate Partner Violence:     Fear of Current or Ex-Partner: Not on file    Emotionally Abused: Not on file    Physically Abused: Not on file    Sexually Abused: Not on file   Housing Stability:     Unable to Pay for Housing in the Last Year: Not on file    Number of [place] in the Last Year: Not on file    Unstable Housing in the Last Year: Not on file       REVIEW OF SYSTEMS     Review of Systems   Constitutional: Negative for chills and fever. Except as noted above the remainder of the review of systems was reviewed and is. PHYSICAL EXAM    (up to 7 for level 4, 8 or more for level 5)     ED Triage Vitals [11/22/21 0729]   BP Temp Temp src Pulse Resp SpO2 Height Weight   136/82 98.3 °F (36.8 °C) -- 95 18 100 % 5' 10\" (1.778 m) 135 lb (61.2 kg)       Physical Exam  Vitals and nursing note reviewed. Constitutional:       Appearance: He is well-developed. HENT:      Head: Normocephalic. Eyes:      Conjunctiva/sclera: Conjunctivae normal.      Pupils: Pupils are equal, round, and reactive to light. Neck:      Trachea: No tracheal deviation. Pulmonary:      Effort: Pulmonary effort is normal.   Musculoskeletal:         General: Normal range of motion. Cervical back: Neck supple. Comments: 3rd/4th toe infection/ulceration as seen on below images. Foot moderately swollen (dorsum). DP/PTpulses intact. No erythema or cellulitis. Skin:     General: Skin is warm and dry. Neurological:      Mental Status: He is alert and oriented to person, place, and time. Cranial Nerves: No cranial nerve deficit. DIAGNOSTIC RESULTS     EKG:(none if blank)  [facility]'s [facility] who either signs or Co-signs this chart in the absence of a cardiologist.        RADIOLOGY: (none if blank)   Interpretation per the Radiologistbelow, if available at the time of this note:    XR FOOT RIGHT (MIN 3 VIEWS)   Final Result   Areas of cortical erosion involving the second metatarsal and first metatarsal with soft tissue swelling and subcutaneous gas. Findings highly suspicious for active osteomyelitis. Routine            **This report has been created using voice recognition software. It may contain minor errors which are inherent in voice recognition technology. **      Final report electronically signed by Dr. Ricky Couch on 11/22/2021 9:04 AM          LABS:  Labs Reviewed   CBC WITH AUTO DIFFERENTIAL - Abnormal; Notable for the following components:       Result Value    RBC 3.94 (*)     Hemoglobin 12.3 (*)     Hematocrit 35.3 (*)     All other components within normal limits   SEDIMENTATION RATE - Abnormal; Notable for the following components:    Sed Rate 15 (*)     All other components within normal limits   C-REACTIVE PROTEIN - Abnormal; Notable for the following components:    CRP 3.66 (*)     All other components within normal limits   CULTURE, BLOOD 1   CULTURE, BLOOD 2   BASIC METABOLIC PANEL W/ REFLEX TO MG FOR LOW K   LACTATE, SEPSIS   ANION GAP   OSMOLALITY   GLOMERULAR FILTRATION RATE, ESTIMATED       All other labs were within normal range or not returned as of this dictation. Please note, any cultures that may have been sent were not resulted at the time of this patient visit. EMERGENCY DEPARTMENT COURSE andMedical Decision Making:     MDM/    Patient seen by podiatry in the emergency department. They recommended a dose of vancomycin in the ED and discharged the patient for outpatient follow-up. Encouraged him to continue taking the doxycycline, but they do not feel his antibiotic coverage needs to be broadened at this point and they do not feel he needs to be hospitalized for IV antibiotics at this point. Strict returnprecautions and follow up instructions were discussed with the patient with which the patient agrees        ED Medications administered this visit:    Medications   vancomycin (VANCOCIN) 1,500 mg in dextrose 5 % 500 mL IVPB (0 mg IntraVENous Stopped 11/22/21 1457)   piperacillin-tazobactam (ZOSYN) 3,375 mg in dextrose 5 % 50 mL IVPB (mini-bag) (0 mg IntraVENous Stopped 11/22/21 1457)         Procedures: (None if blank)       CLINICAL       1.  Osteomyelitis of third toe of right foot [facility])          DISPOSITION/PLAN   DISPOSITION Decision To Discharge

## 2021-11-22 NOTE — CONSULTS
Podiatric Surgery Consult    Reason for Consult:  Right foot wound  Requesting Physician:  Dr. Andressa Mckenna DO    CHIEF COMPLAINT:  Right foot wound    HISTORY OF PRESENT ILLNESS:                The patient is a 55 y.o. male with significant past medical history of Diabetes Mellitus  who is seen at bedside on behalf of Dr. Gustabo Cordova. Patient states he was seen by Dr. Gustabo Cordova last week for a wound to his right foot 3rd digit and was prescribed Doxycycline. Patient states he has had a wound to his right 3rd digit for a few weeks. He has been applying betadine dressings at home. Patient states he noticed increased swelling and redness to the area, and presented to [facility] ED. He denies pain to either foot. He denies trauma to the area. Patient denies N/V/F/SOB/CP/C or other symptoms at this time. Past Medical History:        Diagnosis Date    Diabetes mellitus ([facility] [address]      Past Surgical History:        Procedure Laterality Date    ABDOMEN SURGERY      CATARACT REMOVAL Bilateral     DENTAL SURGERY      EYE SURGERY       Current Medications:    Current Facility-Administered Medications: vancomycin (VANCOCIN) 1,500 mg in dextrose 5 % 500 mL IVPB, 1,500 mg, IntraVENous, Once  piperacillin-tazobactam (ZOSYN) 3,375 mg in dextrose 5 % 50 mL IVPB (mini-bag), 3,375 mg, IntraVENous, Once  Allergies:  Patient has no known allergies.     Social History:    Socioeconomic History    Marital status: Single       Spouse name: Not on file    Number of children: Not on file    Years of education: Not on file    Highest education level: Not on file   Occupational History    Not on file   Tobacco Use    Smoking status: Current Every Day Smoker       Packs/day: 0.50       Types: Cigarettes    Smokeless tobacco: Never Used   Vaping Use    Vaping Use: Never used   Substance and Sexual Activity    Alcohol use: No    Drug use: Not Currently       Types: Marijuana (Azzie Nelly), Opiates        Comment: states last use 2 years ago from 5/26/19.  Sexual activity: Yes   Other Topics Concern    Not on file   Social History Narrative    Not on file      Social Determinants of Health          Financial Resource Strain:     Difficulty of Paying Living Expenses: Not on file   Food Insecurity:     Worried About Running Out of Food in the Last Year: Not on file    Marsha of Food in the Last Year: Not on file   Transportation Needs:     Lack of Transportation (Medical): Not on file    Lack of Transportation (Non-Medical): Not on file   Physical Activity:     Days of Exercise per Week: Not on file    Minutes of Exercise per Session: Not on file   Stress:     Feeling of Stress : Not on file   Social Connections:     Frequency of Communication with Friends and Family: Not on file    Frequency of Social Gatherings with Friends and Family: Not on file    Attends [employer] Services: Not on file    Active Member of [employer] or Organizations: Not on file    Attends Club or Organization Meetings: Not on file    Marital Status: Not on file   Intimate Partner Violence:     Fear of Current or Ex-Partner: Not on file    Emotionally Abused: Not on file    Physically Abused: Not on file    Sexually Abused: Not on file   Housing Stability:     Unable to Pay for Housing in the Last Year: Not on file    Number of [place] in the Last Year: Not on file    Unstable Housing in the Last Year: Not on file       Family History:       Problem Relation Age of Onset    Colon Cancer Maternal Grandfather         possibly    Colon Polyps Neg Hx      REVIEW OF SYSTEMS:    Pertinent items are listed in HPI. All other systems negative.   PHYSICAL EXAM:      Vitals:    /73   Pulse 82   Temp 98.3 °F (36.8 °C)   Resp 18   Ht 5' 10\" (1.778 m)   Wt 135 lb (61.2 kg)   SpO2 99%   BMI 19.37 kg/m²     Results for orders placed or performed during the hospital encounter of 11/22/21   CBC Auto Differential   Result Value Ref Range    WBC 8.9 4.8 - 10.8 thou/mm3    RBC 3.94 (L) 4.70 - 6.10 mill/mm3    Hemoglobin 12.3 (L) 14.0 - 18.0 gm/dl    Hematocrit 35.3 (L) 42.0 - 52.0 %    MCV 89.6 80.0 - 94.0 fL    MCH 31.2 26.0 - 33.0 pg    MCHC 34.8 32.2 - 35.5 gm/dl    RDW-CV 11.9 11.5 - 14.5 %    RDW-SD 39.1 35.0 - 45.0 fL    Platelets 092 056 - 734 thou/mm3    MPV 10.4 9.4 - 12.4 fL    Seg Neutrophils 67.8 %    Lymphocytes 19.0 %    Monocytes 9.0 %    Eosinophils 3.4 %    Basophils 0.6 %    Immature Granulocytes 0.2 %    Segs Absolute 6.0 1.8 - 7.7 thou/mm3    Lymphocytes Absolute 1.7 1.0 - 4.8 thou/mm3    Monocytes Absolute 0.8 0.4 - 1.3 thou/mm3    Eosinophils Absolute 0.3 0.0 - 0.4 thou/mm3    Basophils Absolute 0.1 0.0 - 0.1 thou/mm3    Immature Grans (Abs) 0.02 0.00 - 0.07 thou/mm3    nRBC 0 /100 wbc   Basic Metabolic Panel w/ Reflex to MG   Result Value Ref Range    Sodium 140 135 - 145 meq/L    Potassium reflex Magnesium 3.9 3.5 - 5.2 meq/L    Chloride 104 98 - 111 meq/L    CO2 26 23 - 33 meq/L    Glucose 74 70 - 108 mg/dL    BUN 21 7 - 22 mg/dL    CREATININE 0.8 0.4 - 1.2 mg/dL    Calcium 8.9 8.5 - 10.5 mg/dL   Lactate, Sepsis   Result Value Ref Range    Lactic Acid, Sepsis 0.8 0.5 - 1.9 mmol/L   Sedimentation Rate   Result Value Ref Range    Sed Rate 15 (H) 0 - 10 mm/hr   C-reactive protein   Result Value Ref Range    CRP 3.66 (H) 0.00 - 1.00 mg/dl   Anion Gap   Result Value Ref Range    Anion Gap 10.0 8.0 - 16.0 meq/L   Osmolality   Result Value Ref Range    Osmolality Calc 281.0 275.0 - 300.0 mOsmol/kg   Glomerular Filtration Rate, Estimated   Result Value Ref Range    Est, Glom Filt Rate >90 ml/min/1.73m2       Exam:     Vascular: Dorsalis pedis and posterior tibial pulses are palpable bilaterally. Skin temperature is warm to cool from proximal tibial tuberosity to distal digits left, warm to hot from proximal to distal right. CFT brisk to exposed digits. Edema, non-pitting present diffusely to right foot. Hair growth present.  Quality of skin 8.9 4.8 - 10.8 thou/mm3    RBC 3.94 (L) 4.70 - 6.10 mill/mm3    Hemoglobin 12.3 (L) 14.0 - 18.0 gm/dl    Hematocrit 35.3 (L) 42.0 - 52.0 %    MCV 89.6 80.0 - 94.0 fL    MCH 31.2 26.0 - 33.0 pg    MCHC 34.8 32.2 - 35.5 gm/dl    RDW-CV 11.9 11.5 - 14.5 %    RDW-SD 39.1 35.0 - 45.0 fL    Platelets 233 615 - 720 thou/mm3    MPV 10.4 9.4 - 12.4 fL    Seg Neutrophils 67.8 %    Lymphocytes 19.0 %    Monocytes 9.0 %    Eosinophils 3.4 %    Basophils 0.6 %    Immature Granulocytes 0.2 %    Segs Absolute 6.0 1.8 - 7.7 thou/mm3    Lymphocytes Absolute 1.7 1.0 - 4.8 thou/mm3    Monocytes Absolute 0.8 0.4 - 1.3 thou/mm3    Eosinophils Absolute 0.3 0.0 - 0.4 thou/mm3    Basophils Absolute 0.1 0.0 - 0.1 thou/mm3    Immature Grans (Abs) 0.02 0.00 - 0.07 thou/mm3    nRBC 0 /100 wbc   Basic Metabolic Panel w/ Reflex to MG   Result Value Ref Range    Sodium 140 135 - 145 meq/L    Potassium reflex Magnesium 3.9 3.5 - 5.2 meq/L    Chloride 104 98 - 111 meq/L    CO2 26 23 - 33 meq/L    Glucose 74 70 - 108 mg/dL    BUN 21 7 - 22 mg/dL    CREATININE 0.8 0.4 - 1.2 mg/dL    Calcium 8.9 8.5 - 10.5 mg/dL   Lactate, Sepsis   Result Value Ref Range    Lactic Acid, Sepsis 0.8 0.5 - 1.9 mmol/L   Sedimentation Rate   Result Value Ref Range    Sed Rate 15 (H) 0 - 10 mm/hr   C-reactive protein   Result Value Ref Range    CRP 3.66 (H) 0.00 - 1.00 mg/dl   Anion Gap   Result Value Ref Range    Anion Gap 10.0 8.0 - 16.0 meq/L   Osmolality   Result Value Ref Range    Osmolality Calc 281.0 275.0 - 300.0 mOsmol/kg   Glomerular Filtration Rate, Estimated   Result Value Ref Range    Est, Glom Filt Rate >90 ml/min/1.73m2           Assessment  Principle  Infected full-thickness ulcer, right foot  Cellulitis, RLE    Plan  Patient initially examined and evaluated  IV antibiotics per ED physician  WBC 8.9; patient afebrile  Wound care provided with betadine, gauze, margret, ACE to RLE.    XR right foot reviewed, impression above  Patient is ok to DC per podiatry  RX

## 2021-11-22 NOTE — ED NOTES
Pt resting on cot.  Podiatry at bedside     Amanda Lanes, [address] [facility]  11/22/21 3228

## 2021-11-22 NOTE — ED NOTES
Pt resting on cot. Mother at bedside. Lights off for comfort.      [facility], RN  11/22/21 9004

## 2021-11-27 LAB
BLOOD CULTURE, ROUTINE: NORMAL
BLOOD CULTURE, ROUTINE: NORMAL

## 2022-01-23 ENCOUNTER — HOSPITAL ENCOUNTER (EMERGENCY)
Age: 47
Discharge: HOME | End: 2022-01-23
Attending: EMERGENCY MEDICINE

## 2022-01-23 VITALS
SYSTOLIC BLOOD PRESSURE: 110 MMHG | RESPIRATION RATE: 18 BRPM | TEMPERATURE: 96.7 F | HEIGHT: 70 IN | BODY MASS INDEX: 18.61 KG/M2 | DIASTOLIC BLOOD PRESSURE: 77 MMHG | OXYGEN SATURATION: 100 % | HEART RATE: 100 BPM | WEIGHT: 130 LBS

## 2022-01-23 DIAGNOSIS — E10.65 HYPERGLYCEMIA DUE TO TYPE 1 DIABETES MELLITUS (HCC): Primary | ICD-10-CM

## 2022-01-23 LAB
ABSOLUTE EOS #: 0.03 K/UL (ref 0–0.44)
ABSOLUTE IMMATURE GRANULOCYTE: 0.03 K/UL (ref 0–0.3)
ABSOLUTE LYMPH #: 0.82 K/UL (ref 1.1–3.7)
ABSOLUTE MONO #: 0.75 K/UL (ref 0.1–1.2)
ANION GAP SERPL CALCULATED.3IONS-SCNC: 14 MMOL/L (ref 9–17)
BASOPHILS # BLD: 0 % (ref 0–2)
BASOPHILS ABSOLUTE: 0.04 K/UL (ref 0–0.2)
BUN BLDV-MCNC: 24 MG/DL (ref 6–20)
BUN/CREAT BLD: 23 (ref 9–20)
CALCIUM SERPL-MCNC: 8.9 MG/DL (ref 8.6–10.4)
CHLORIDE BLD-SCNC: 95 MMOL/L (ref 98–107)
CO2: 21 MMOL/L (ref 20–31)
CREAT SERPL-MCNC: 1.04 MG/DL (ref 0.7–1.2)
DIFFERENTIAL TYPE: ABNORMAL
EOSINOPHILS RELATIVE PERCENT: 0 % (ref 1–4)
GFR AFRICAN AMERICAN: >60 ML/MIN
GFR NON-AFRICAN AMERICAN: >60 ML/MIN
GFR SERPL CREATININE-BSD FRML MDRD: ABNORMAL ML/MIN/{1.73_M2}
GFR SERPL CREATININE-BSD FRML MDRD: ABNORMAL ML/MIN/{1.73_M2}
GLUCOSE BLD-MCNC: 240 MG/DL (ref 75–110)
GLUCOSE BLD-MCNC: 313 MG/DL (ref 70–99)
HCT VFR BLD CALC: 32.7 % (ref 40.7–50.3)
HEMOGLOBIN: 11.4 G/DL (ref 13–17)
IMMATURE GRANULOCYTES: 0 %
LYMPHOCYTES # BLD: 8 % (ref 24–43)
MCH RBC QN AUTO: 31.5 PG (ref 25.2–33.5)
MCHC RBC AUTO-ENTMCNC: 34.9 G/DL (ref 25.2–33.5)
MCV RBC AUTO: 90.3 FL (ref 82.6–102.9)
MONOCYTES # BLD: 7 % (ref 3–12)
NRBC AUTOMATED: 0 PER 100 WBC
PDW BLD-RTO: 14 % (ref 11.8–14.4)
PLATELET # BLD: 152 K/UL (ref 138–453)
PLATELET ESTIMATE: ABNORMAL
PMV BLD AUTO: 10.2 FL (ref 8.1–13.5)
POTASSIUM SERPL-SCNC: 5.5 MMOL/L (ref 3.7–5.3)
RBC # BLD: 3.62 M/UL (ref 4.21–5.77)
RBC # BLD: ABNORMAL 10*6/UL
SEG NEUTROPHILS: 85 % (ref 36–65)
SEGMENTED NEUTROPHILS ABSOLUTE COUNT: 9.29 K/UL (ref 1.5–8.1)
SODIUM BLD-SCNC: 130 MMOL/L (ref 135–144)
WBC # BLD: 11 K/UL (ref 3.5–11.3)
WBC # BLD: ABNORMAL 10*3/UL

## 2022-01-23 PROCEDURE — 36415 COLL VENOUS BLD VENIPUNCTURE: CPT

## 2022-01-23 PROCEDURE — 82947 ASSAY GLUCOSE BLOOD QUANT: CPT

## 2022-01-23 PROCEDURE — 80048 BASIC METABOLIC PNL TOTAL CA: CPT

## 2022-01-23 PROCEDURE — 99283 EMERGENCY DEPT VISIT LOW MDM: CPT

## 2022-01-23 PROCEDURE — 85025 COMPLETE CBC W/AUTO DIFF WBC: CPT

## 2022-01-23 ASSESSMENT — PAIN SCALES - GENERAL: PAINLEVEL_OUTOF10: 8

## 2022-01-23 NOTE — ED PROVIDER NOTES
[address] [facility] ED  [address]  Phone: [phone]      Pt Name: Tabitha Gutierrez  DUSTIN:[id number]  Birthdate 1975  Date of evaluation: 1/23/2022      CHIEF COMPLAINT       Chief Complaint   Patient presents with    Other     insuln pump disconnected, states \"not feeling well\"       HISTORY OF PRESENT ILLNESS   Tabitha uGtierrez is a 52 y.o. male with history of type 1 diabetes who presents for evaluation of malaise. The patient reports that he works at the [employer] and around 12:30 AM he accidentally locked himself out of the restaurant with his coat and car keys inside. He states that his car was unlocked and he waited in his car for approximately 2 hours but was unable to get a hold of anyone to come pick him up or get a hold of his coworkers to come open the building. He states that he eventually walked here to the emergency department for evaluation. He states that throughout the day today he did feel \"ill\" and yesterday he did have nausea with 1 episode of nonbilious nonbloody emesis. He realized upon arrival to the ER that his insulin pump cannula has been disconnected, likely all day. He states that his replacement pieces for this at home but he lives 1.5 hours away. He suspects that he has not been getting insulin all day. The patient states that he normally would not have come in tonight for his symptoms if he had not locked himself out and had nowhere else to go. The patient states that he had all of his toes amputated on 12/1/2021 by his podiatrist at the [address] [place] [address] secondary to his diabetes. He states that he has been followed by infectious disease and podiatry and finished antibiotics 1 week ago. He is scheduled to see them next week for reevaluation. He states that he cleans the surgical wound daily with Betadine and wrapped with gauze. He states that tonight his foot does appear to be slightly red but he is unsure if it is because he worked all day. The patient denies fever, headache, vision changes, headache, vision changes, neck pain, back pain, chest pain, shortness of breath, abdominal pain, urinary/bowel symptoms, focal weakness, numbness, tingling, recent injury or illness. He is COVID vaccinated x3. REVIEW OF SYSTEMS     Ten point review of systems was reviewed and is negative unless otherwise noted in the HPI    [address]    has a past medical history of Diabetes mellitus ([facility] [address] SURGICAL HISTORY      has a past surgical history that includes Abdomen surgery; eye surgery; Cataract removal (Bilateral); and Dental surgery. CURRENT MEDICATIONS       Previous Medications    CYANOCOBALAMIN 1000 MCG TABLET    Take 1,000 mcg by mouth daily. DULOXETINE (CYMBALTA) 30 MG EXTENDED RELEASE CAPSULE    Take 60 mg by mouth daily    GABAPENTIN (NEURONTIN) 600 MG TABLET    Take 600 mg by mouth 3 times daily. HYDROXYZINE (ATARAX) 25 MG TABLET    Take 25 mg by mouth nightly    INSULIN PUMP KAI    Inject 1 each into the skin See Admin Instructions    METOPROLOL TARTRATE (LOPRESSOR) 25 MG TABLET    Take 1 tablet by mouth 2 times daily    SIMVASTATIN (ZOCOR) 20 MG TABLET    Take 1 tablet by mouth nightly       ALLERGIES     has No Known Allergies. FAMILY HISTORY     He indicated that his mother is alive. He indicated that the status of his maternal grandfather is unknown. He indicated that the status of his neg hx is unknown.     family history includes Colon Cancer in his maternal grandfather. SOCIAL HISTORY      reports that he has been smoking cigarettes. He has been smoking about 0.50 packs per day. He has never used smokeless tobacco. He reports previous drug use. Drugs: Marijuana (Weed) and Opiates . He reports that he does not drink alcohol. PHYSICAL EXAM     INITIAL VITALS:  height is 5' 10\" (1.778 m) and weight is 130 lb (59 kg). His tympanic temperature is 96.7 °F (35.9 °C). His blood pressure is 110/77 and his pulse is 100.  His respiration is 18 and oxygen saturation is 100%. CONSTITUTIONAL: anxious, no apparent distress, well appearing  SKIN: There is a well healing surgical scar to the right forefoot with recent amputation of all of the digits of the right foot. There is slight erythema to the distal forefoot but no induration or fluctuance or lymphangitic streaking. No wound dehiscence. There is a small 1 cm x 1 cm ulcer over the distal lateral wound at the base of where the pinky toe would be. Strength 5/5 with dorsi and plantar flexion of the bilateral feet. DP/PT pulses 2/4 and equal bilaterally. Skin otherwise warm, dry, no jaundice, hives or petechiae  EYES: clear conjunctiva, non-icteric sclera  HENT: normocephalic, atraumatic, moist mucus membranes  NECK: Nontender and supple with no nuchal rigidity, full range of motion  PULMONARY: clear to auscultation without wheezes, rhonchi, or rales, normal excursion, no accessory muscle use and no stridor  CARDIOVASCULAR: regular rate, rhythm. Strong radial pulses with intact distal perfusion. Capillary refill <2 seconds. GASTROINTESTINAL: soft, non-tender, non-distended, no palpable masses, no rebound or guarding   GENITOURINARY: No costovertebral angle tenderness to palpation  MUSCULOSKELETAL: No midline spinal tenderness, step off or deformity. Extremities are otherwise nontender to palpation and nonerythematous. Compartments soft. No peripheral edema. NEUROLOGIC: alert and oriented x 3, GCS 15, normal mentation and speech. Moves all extremities x 4 without motor or sensory deficit, gait is stable without ataxia  PSYCHIATRIC: normal mood and affect, thought process is clear and linear    DIAGNOSTIC RESULTS     EKG:  None    RADIOLOGY:   No results found.     LABS:  Results for orders placed or performed during the hospital encounter of 01/23/22   CBC Auto Differential   Result Value Ref Range    WBC 11.0 3.5 - 11.3 k/uL    RBC 3.62 (L) 4.21 - 5.77 m/uL    Hemoglobin 11.4 (L) 13.0 - 17.0 g/dL    Hematocrit 32.7 (L) 40.7 - 50.3 %    MCV 90.3 82.6 - 102.9 fL    MCH 31.5 25.2 - 33.5 pg    MCHC 34.9 (H) 25.2 - 33.5 g/dL    RDW 14.0 11.8 - 14.4 %    Platelets 458 805 - 017 k/uL    MPV 10.2 8.1 - 13.5 fL    NRBC Automated 0.0 0.0 per 100 WBC    Differential Type NOT REPORTED     Seg Neutrophils 85 (H) 36 - 65 %    Lymphocytes 8 (L) 24 - 43 %    Monocytes 7 3 - 12 %    Eosinophils % 0 (L) 1 - 4 %    Basophils 0 0 - 2 %    Immature Granulocytes 0 0 %    Segs Absolute 9.29 (H) 1.50 - 8.10 k/uL    Absolute Lymph # 0.82 (L) 1.10 - 3.70 k/uL    Absolute Mono # 0.75 0.10 - 1.20 k/uL    Absolute Eos # 0.03 0.00 - 0.44 k/uL    Basophils Absolute 0.04 0.00 - 0.20 k/uL    Absolute Immature Granulocyte 0.03 0.00 - 0.30 k/uL    WBC Morphology NOT REPORTED     RBC Morphology NOT REPORTED     Platelet Estimate NOT REPORTED    Basic Metabolic Panel w/ Reflex to MG   Result Value Ref Range    Glucose 313 (H) 70 - 99 mg/dL    BUN 24 (H) 6 - 20 mg/dL    CREATININE 1.04 0.70 - 1.20 mg/dL    Bun/Cre Ratio 23 (H) 9 - 20    Calcium 8.9 8.6 - 10.4 mg/dL    Sodium 130 (L) 135 - 144 mmol/L    Potassium 5.5 (H) 3.7 - 5.3 mmol/L    Chloride 95 (L) 98 - 107 mmol/L    CO2 21 20 - 31 mmol/L    Anion Gap 14 9 - 17 mmol/L    GFR Non-African American >60 >60 mL/min    GFR African American >60 >60 mL/min    GFR Comment          GFR Staging NOT REPORTED    POC Glucose Fingerstick   Result Value Ref Range    POC Glucose 240 (H) 75 - 110 mg/dL       EMERGENCY DEPARTMENT COURSE:        The patient was given the following medications:  No orders of the defined types were placed in this encounter.        Vitals:    Vitals:    01/23/22 0245 01/23/22 0303 01/23/22 0345   BP: 105/87  110/77   Pulse: 112 92 100   Resp: 23 18    Temp: 96.7 °F (35.9 °C)     TempSrc: Tympanic     SpO2: 100%     Weight: 130 lb (59 kg)     Height: 5' 10\" (1.778 m)       -------------------------  BP: 110/77, Temp: 96.7 °F (35.9 °C), Pulse: 100, Resp: 18    CONSULTS:  None    CRITICAL CARE:   None    PROCEDURES:  None    DIAGNOSIS/ MDM:   Kareem Shabazz is a 52 y.o. male who presents with generalized malaise. He states that he has been sitting outside in the cold for 2 hours after locking himself out of his workplace. He states that he would not normally have gone to the ER tonight but he had nowhere else to go. He arrives slightly tachycardic and was breathing at an increased respiratory rate but I suspect this was secondary to anxiety. After talking him for a few minutes his heart rate and respiratory rate returned to normal.  Vital signs are otherwise stable. He has slight erythema to his right forefoot surgical wound which is approximately 2 months old. No wound dehiscence, induration or fluctuance. He states that his insulin pump accidentally became dislodged tonight and his replacement pieces for this at home. Point-of-care glucose is 240 which based on chart review is at his baseline. CBC is unremarkable other than slight anemia with hemoglobin 11.4. BMP shows hyperglycemia of 313 with corrected sodium of 133. Potassium is mildly elevated at 5.5 but I suspect this is falsely elevated because of his diabetes and lack of insulin over the past few hours. I have low suspicion for DKA. He is tolerating oral fluids and I do not feel that he needs any acute treatment for this at this time. I think the patient can wait until the morning to start his normal insulin regimen through his pump. He was instructed to follow-up with his doctor within 1 to 2 days and to continue to monitor his blood sugar at home. I have low suspicion for infection at this time. He is scheduled to see ID and podiatry this week for his foot and I do not feel that he needs any additional antibiotics at this time. I instructed the patient to continue to dress the right foot wound and to return to the ER for worsening symptoms or any other concern.  The patient understands that at this time there is no evidence for a more malignant underlying process, but also understands that early in the process of an illness or injury, an emergency department work-up can be falsely reassuring. Routine discharge counseling was given, and the patient understands that worsening, changing or persistent symptoms should prompt a immediate call or follow-up with their primary care physician or return to the emergency department. The importance of appropriate follow-up was also discussed. I have reviewed the disposition diagnosis with the patient. I have answered their questions and given discharge instructions. They voiced understanding of these instructions and did not have any further questions or complaints. FINAL IMPRESSION      1. Hyperglycemia due to type 1 diabetes mellitus ([facility] [address]          DISPOSITION/PLAN   DISPOSITION          PATIENT REFERRED TO:  José Swan, APRN - CNP  [address]    Schedule an appointment as soon as possible for a visit in 2 days      Your podiatrist and infectious disease doctor    Schedule an appointment as soon as possible for a visit in 2 days      [address] [facility]  [address]  [phone]    If symptoms worsen      DISCHARGE MEDICATIONS:  New Prescriptions    No medications on file       (Please note that portions of this note were completed with a voice recognitionprogram.  Efforts were made to edit the dictations but occasionally words are mis-transcribed.)    Sissy Anders DO, [address] [facility] [facility]  Emergency Physician Attending         Sissy Anders DO  01/23/22 4400

## 2023-01-19 ENCOUNTER — HOSPITAL ENCOUNTER (OUTPATIENT)
Age: 48
Setting detail: OBSERVATION
Discharge: HOME | End: 2023-01-21
Attending: EMERGENCY MEDICINE | Admitting: INTERNAL MEDICINE
Payer: OTHER GOVERNMENT

## 2023-01-19 ENCOUNTER — APPOINTMENT (OUTPATIENT)
Dept: GENERAL RADIOLOGY | Age: 48
End: 2023-01-19
Payer: OTHER GOVERNMENT

## 2023-01-19 ENCOUNTER — APPOINTMENT (OUTPATIENT)
Dept: CT IMAGING | Age: 48
End: 2023-01-19
Payer: OTHER GOVERNMENT

## 2023-01-19 DIAGNOSIS — E10.65 UNCONTROLLED TYPE 1 DIABETES MELLITUS WITH HYPERGLYCEMIA (HCC): Primary | ICD-10-CM

## 2023-01-19 DIAGNOSIS — R11.2 NAUSEA AND VOMITING, UNSPECIFIED VOMITING TYPE: ICD-10-CM

## 2023-01-19 DIAGNOSIS — E86.0 DEHYDRATION: ICD-10-CM

## 2023-01-19 LAB — GLUCOSE BLD STRIP.AUTO-MCNC: 196 MG/DL (ref 70–108)

## 2023-01-19 PROCEDURE — 87636 SARSCOV2 & INF A&B AMP PRB: CPT

## 2023-01-19 PROCEDURE — 83880 ASSAY OF NATRIURETIC PEPTIDE: CPT

## 2023-01-19 PROCEDURE — 70450 CT HEAD/BRAIN W/O DYE: CPT

## 2023-01-19 PROCEDURE — 96361 HYDRATE IV INFUSION ADD-ON: CPT

## 2023-01-19 PROCEDURE — 2580000003 HC RX 258: Performed by: EMERGENCY MEDICINE

## 2023-01-19 PROCEDURE — 83690 ASSAY OF LIPASE: CPT

## 2023-01-19 PROCEDURE — 82010 KETONE BODYS QUAN: CPT

## 2023-01-19 PROCEDURE — 80307 DRUG TEST PRSMV CHEM ANLYZR: CPT

## 2023-01-19 PROCEDURE — 36415 COLL VENOUS BLD VENIPUNCTURE: CPT

## 2023-01-19 PROCEDURE — 82948 REAGENT STRIP/BLOOD GLUCOSE: CPT

## 2023-01-19 PROCEDURE — 84443 ASSAY THYROID STIM HORMONE: CPT

## 2023-01-19 PROCEDURE — 93005 ELECTROCARDIOGRAM TRACING: CPT | Performed by: EMERGENCY MEDICINE

## 2023-01-19 PROCEDURE — 71045 X-RAY EXAM CHEST 1 VIEW: CPT

## 2023-01-19 PROCEDURE — 84484 ASSAY OF TROPONIN QUANT: CPT

## 2023-01-19 PROCEDURE — 96374 THER/PROPH/DIAG INJ IV PUSH: CPT

## 2023-01-19 PROCEDURE — 99285 EMERGENCY DEPT VISIT HI MDM: CPT

## 2023-01-19 PROCEDURE — 85025 COMPLETE CBC W/AUTO DIFF WBC: CPT

## 2023-01-19 PROCEDURE — 83735 ASSAY OF MAGNESIUM: CPT

## 2023-01-19 PROCEDURE — 82248 BILIRUBIN DIRECT: CPT

## 2023-01-19 PROCEDURE — 80053 COMPREHEN METABOLIC PANEL: CPT

## 2023-01-19 PROCEDURE — 82077 ASSAY SPEC XCP UR&BREATH IA: CPT

## 2023-01-19 RX ORDER — ONDANSETRON 2 MG/ML
4 INJECTION INTRAMUSCULAR; INTRAVENOUS ONCE
Status: COMPLETED | OUTPATIENT
Start: 2023-01-20 | End: 2023-01-20

## 2023-01-19 RX ORDER — 0.9 % SODIUM CHLORIDE 0.9 %
1000 INTRAVENOUS SOLUTION INTRAVENOUS ONCE
Status: COMPLETED | OUTPATIENT
Start: 2023-01-20 | End: 2023-01-20

## 2023-01-19 RX ADMIN — SODIUM CHLORIDE 1000 ML: 9 INJECTION, SOLUTION INTRAVENOUS at 23:55

## 2023-01-19 ASSESSMENT — PAIN SCALES - GENERAL: PAINLEVEL_OUTOF10: 7

## 2023-01-19 ASSESSMENT — PAIN - FUNCTIONAL ASSESSMENT: PAIN_FUNCTIONAL_ASSESSMENT: 0-10

## 2023-01-19 ASSESSMENT — PAIN DESCRIPTION - LOCATION: LOCATION: GENERALIZED

## 2023-01-20 PROBLEM — R11.2 NAUSEA & VOMITING: Status: ACTIVE | Noted: 2023-01-20

## 2023-01-20 LAB
ALBUMIN SERPL BCG-MCNC: 4 G/DL (ref 3.5–5.1)
ALP SERPL-CCNC: 69 U/L (ref 38–126)
ALT SERPL W/O P-5'-P-CCNC: 13 U/L (ref 11–66)
AMPHETAMINES UR QL SCN: NEGATIVE
ANION GAP SERPL CALC-SCNC: 14 MEQ/L (ref 8–16)
ANION GAP SERPL CALC-SCNC: 14 MEQ/L (ref 8–16)
ARTERIAL PATENCY WRIST A: POSITIVE
AST SERPL-CCNC: 19 U/L (ref 5–40)
B-OH-BUTYR SERPL-MSCNC: 29.1 MG/DL (ref 0.2–2.81)
BACTERIA URNS QL MICRO: ABNORMAL /HPF
BARBITURATES UR QL SCN: NEGATIVE
BASE EXCESS BLDA CALC-SCNC: -0.5 MMOL/L (ref -2.5–2.5)
BASOPHILS ABSOLUTE: 0 THOU/MM3 (ref 0–0.1)
BASOPHILS NFR BLD AUTO: 0.2 %
BDY SITE: NORMAL
BENZODIAZ UR QL SCN: NEGATIVE
BILIRUB CONJ SERPL-MCNC: 0.3 MG/DL (ref 0–0.3)
BILIRUB SERPL-MCNC: 1 MG/DL (ref 0.3–1.2)
BILIRUB UR QL STRIP.AUTO: NEGATIVE
BUN SERPL-MCNC: 28 MG/DL (ref 7–22)
BUN SERPL-MCNC: 36 MG/DL (ref 7–22)
BZE UR QL SCN: NEGATIVE
CALCIUM SERPL-MCNC: 7.5 MG/DL (ref 8.5–10.5)
CALCIUM SERPL-MCNC: 8.7 MG/DL (ref 8.5–10.5)
CANNABINOIDS UR QL SCN: NEGATIVE
CASTS #/AREA URNS LPF: ABNORMAL /LPF
CASTS 2: ABNORMAL /LPF
CHARACTER UR: CLEAR
CHLORIDE SERPL-SCNC: 104 MEQ/L (ref 98–111)
CHLORIDE SERPL-SCNC: 94 MEQ/L (ref 98–111)
CO2 SERPL-SCNC: 22 MEQ/L (ref 23–33)
CO2 SERPL-SCNC: 25 MEQ/L (ref 23–33)
COLLECTED BY:: NORMAL
COLOR: YELLOW
CREAT SERPL-MCNC: 1 MG/DL (ref 0.4–1.2)
CREAT SERPL-MCNC: 1.3 MG/DL (ref 0.4–1.2)
CRYSTALS URNS MICRO: ABNORMAL
DEPRECATED RDW RBC AUTO: 34.7 FL (ref 35–45)
DEVICE: NORMAL
EOSINOPHIL NFR BLD AUTO: 0.2 %
EOSINOPHILS ABSOLUTE: 0 THOU/MM3 (ref 0–0.4)
EPITHELIAL CELLS, UA: ABNORMAL /HPF
ERYTHROCYTE [DISTWIDTH] IN BLOOD BY AUTOMATED COUNT: 11.6 % (ref 11.5–14.5)
ETHANOL SERPL-MCNC: < 0.01 %
FENTANYL: NEGATIVE
FIO2 ON VENT O2 ANALYZER: 21 %
FLUAV RNA RESP QL NAA+PROBE: NOT DETECTED
FLUBV RNA RESP QL NAA+PROBE: NOT DETECTED
GFR SERPL CREATININE-BSD FRML MDRD: > 60 ML/MIN/1.73M2
GFR SERPL CREATININE-BSD FRML MDRD: > 60 ML/MIN/1.73M2
GLUCOSE BLD STRIP.AUTO-MCNC: 105 MG/DL (ref 70–108)
GLUCOSE BLD STRIP.AUTO-MCNC: 145 MG/DL (ref 70–108)
GLUCOSE BLD STRIP.AUTO-MCNC: 166 MG/DL (ref 70–108)
GLUCOSE BLD STRIP.AUTO-MCNC: 273 MG/DL (ref 70–108)
GLUCOSE BLD STRIP.AUTO-MCNC: 309 MG/DL (ref 70–108)
GLUCOSE SERPL-MCNC: 130 MG/DL (ref 70–108)
GLUCOSE SERPL-MCNC: 193 MG/DL (ref 70–108)
GLUCOSE UR QL STRIP.AUTO: 500 MG/DL
HCO3 BLDA-SCNC: 24 MMOL/L (ref 23–28)
HCT VFR BLD AUTO: 42.2 % (ref 42–52)
HGB BLD-MCNC: 15.3 GM/DL (ref 14–18)
HGB UR QL STRIP.AUTO: NEGATIVE
IMM GRANULOCYTES # BLD AUTO: 0.02 THOU/MM3 (ref 0–0.07)
IMM GRANULOCYTES NFR BLD AUTO: 0.2 %
KETONES UR QL STRIP.AUTO: 80
LIPASE SERPL-CCNC: 12.4 U/L (ref 5.6–51.3)
LYMPHOCYTES ABSOLUTE: 1 THOU/MM3 (ref 1–4.8)
LYMPHOCYTES NFR BLD AUTO: 12 %
MAGNESIUM SERPL-MCNC: 2.2 MG/DL (ref 1.6–2.4)
MCH RBC QN AUTO: 29.9 PG (ref 26–33)
MCHC RBC AUTO-ENTMCNC: 36.3 GM/DL (ref 32.2–35.5)
MCV RBC AUTO: 82.6 FL (ref 80–94)
MISCELLANEOUS 2: ABNORMAL
MONOCYTES ABSOLUTE: 0.7 THOU/MM3 (ref 0.4–1.3)
MONOCYTES NFR BLD AUTO: 7.6 %
NEUTROPHILS NFR BLD AUTO: 79.8 %
NITRITE UR QL STRIP: NEGATIVE
NRBC BLD AUTO-RTO: 0 /100 WBC
NT-PROBNP SERPL IA-MCNC: 120.6 PG/ML (ref 0–124)
OPIATES UR QL SCN: NEGATIVE
OSMOLALITY SERPL CALC.SUM OF ELEC: 280 MOSMOL/KG (ref 275–300)
OSMOLALITY SERPL CALC.SUM OF ELEC: 286.6 MOSMOL/KG (ref 275–300)
OXYCODONE: NEGATIVE
PCO2 BLDA: 37 MMHG (ref 35–45)
PCP UR QL SCN: NEGATIVE
PH BLDA: 7.41 [PH] (ref 7.35–7.45)
PH UR STRIP.AUTO: 6.5 [PH] (ref 5–9)
PLATELET # BLD AUTO: 189 THOU/MM3 (ref 130–400)
PMV BLD AUTO: 10.7 FL (ref 9.4–12.4)
PO2 BLDA: 82 MMHG (ref 71–104)
POTASSIUM SERPL-SCNC: 3.7 MEQ/L (ref 3.5–5.2)
POTASSIUM SERPL-SCNC: 3.8 MEQ/L (ref 3.5–5.2)
PROT SERPL-MCNC: 6.4 G/DL (ref 6.1–8)
PROT UR STRIP.AUTO-MCNC: 100 MG/DL
RBC # BLD AUTO: 5.11 MILL/MM3 (ref 4.7–6.1)
RBC URINE: ABNORMAL /HPF
RENAL EPI CELLS #/AREA URNS HPF: ABNORMAL /[HPF]
SAO2 % BLDA: 96 %
SARS-COV-2 RNA RESP QL NAA+PROBE: NOT DETECTED
SEGMENTED NEUTROPHILS ABSOLUTE COUNT: 6.9 THOU/MM3 (ref 1.8–7.7)
SODIUM SERPL-SCNC: 133 MEQ/L (ref 135–145)
SODIUM SERPL-SCNC: 140 MEQ/L (ref 135–145)
SP GR UR REFRACT.AUTO: 1.03 (ref 1–1.03)
TROPONIN T: < 0.01 NG/ML
TSH SERPL DL<=0.005 MIU/L-ACNC: 0.74 UIU/ML (ref 0.4–4.2)
UROBILINOGEN, URINE: 1 EU/DL (ref 0–1)
WBC # BLD AUTO: 8.6 THOU/MM3 (ref 4.8–10.8)
WBC #/AREA URNS HPF: ABNORMAL /HPF
WBC #/AREA URNS HPF: NEGATIVE /[HPF]
YEAST LIKE FUNGI URNS QL MICRO: ABNORMAL

## 2023-01-20 PROCEDURE — G0378 HOSPITAL OBSERVATION PER HR: HCPCS

## 2023-01-20 PROCEDURE — 96375 TX/PRO/DX INJ NEW DRUG ADDON: CPT

## 2023-01-20 PROCEDURE — 2580000003 HC RX 258: Performed by: EMERGENCY MEDICINE

## 2023-01-20 PROCEDURE — 36415 COLL VENOUS BLD VENIPUNCTURE: CPT

## 2023-01-20 PROCEDURE — 2580000003 HC RX 258: Performed by: STUDENT IN AN ORGANIZED HEALTH CARE EDUCATION/TRAINING PROGRAM

## 2023-01-20 PROCEDURE — 82948 REAGENT STRIP/BLOOD GLUCOSE: CPT

## 2023-01-20 PROCEDURE — 6360000002 HC RX W HCPCS: Performed by: EMERGENCY MEDICINE

## 2023-01-20 PROCEDURE — 99223 1ST HOSP IP/OBS HIGH 75: CPT | Performed by: HOSPITALIST

## 2023-01-20 PROCEDURE — 82803 BLOOD GASES ANY COMBINATION: CPT

## 2023-01-20 PROCEDURE — 6370000000 HC RX 637 (ALT 250 FOR IP): Performed by: EMERGENCY MEDICINE

## 2023-01-20 PROCEDURE — 96376 TX/PRO/DX INJ SAME DRUG ADON: CPT

## 2023-01-20 PROCEDURE — 36600 WITHDRAWAL OF ARTERIAL BLOOD: CPT

## 2023-01-20 PROCEDURE — 96361 HYDRATE IV INFUSION ADD-ON: CPT

## 2023-01-20 PROCEDURE — 96374 THER/PROPH/DIAG INJ IV PUSH: CPT

## 2023-01-20 PROCEDURE — 6360000002 HC RX W HCPCS: Performed by: STUDENT IN AN ORGANIZED HEALTH CARE EDUCATION/TRAINING PROGRAM

## 2023-01-20 PROCEDURE — 80048 BASIC METABOLIC PNL TOTAL CA: CPT

## 2023-01-20 PROCEDURE — 81001 URINALYSIS AUTO W/SCOPE: CPT

## 2023-01-20 RX ORDER — ONDANSETRON 4 MG/1
4 TABLET, ORALLY DISINTEGRATING ORAL EVERY 8 HOURS PRN
Status: DISCONTINUED | OUTPATIENT
Start: 2023-01-20 | End: 2023-01-21 | Stop reason: HOSPADM

## 2023-01-20 RX ORDER — SODIUM CHLORIDE 9 MG/ML
INJECTION, SOLUTION INTRAVENOUS PRN
Status: DISCONTINUED | OUTPATIENT
Start: 2023-01-20 | End: 2023-01-21 | Stop reason: HOSPADM

## 2023-01-20 RX ORDER — INSULIN LISPRO 100 [IU]/ML
0-4 INJECTION, SOLUTION INTRAVENOUS; SUBCUTANEOUS
Status: DISCONTINUED | OUTPATIENT
Start: 2023-01-20 | End: 2023-01-20

## 2023-01-20 RX ORDER — POLYETHYLENE GLYCOL 3350 17 G/17G
17 POWDER, FOR SOLUTION ORAL DAILY PRN
Status: DISCONTINUED | OUTPATIENT
Start: 2023-01-20 | End: 2023-01-21 | Stop reason: HOSPADM

## 2023-01-20 RX ORDER — ATORVASTATIN CALCIUM 10 MG/1
10 TABLET, FILM COATED ORAL DAILY
Status: DISCONTINUED | OUTPATIENT
Start: 2023-01-20 | End: 2023-01-21 | Stop reason: HOSPADM

## 2023-01-20 RX ORDER — 0.9 % SODIUM CHLORIDE 0.9 %
1000 INTRAVENOUS SOLUTION INTRAVENOUS ONCE
Status: COMPLETED | OUTPATIENT
Start: 2023-01-20 | End: 2023-01-20

## 2023-01-20 RX ORDER — DULOXETIN HYDROCHLORIDE 60 MG/1
60 CAPSULE, DELAYED RELEASE ORAL DAILY
Status: DISCONTINUED | OUTPATIENT
Start: 2023-01-20 | End: 2023-01-21 | Stop reason: HOSPADM

## 2023-01-20 RX ORDER — ACETAMINOPHEN 500 MG
1000 TABLET ORAL ONCE
Status: COMPLETED | OUTPATIENT
Start: 2023-01-20 | End: 2023-01-20

## 2023-01-20 RX ORDER — GABAPENTIN 400 MG/1
800 CAPSULE ORAL 3 TIMES DAILY
COMMUNITY

## 2023-01-20 RX ORDER — SODIUM CHLORIDE, SODIUM LACTATE, POTASSIUM CHLORIDE, CALCIUM CHLORIDE 600; 310; 30; 20 MG/100ML; MG/100ML; MG/100ML; MG/100ML
INJECTION, SOLUTION INTRAVENOUS CONTINUOUS
Status: DISCONTINUED | OUTPATIENT
Start: 2023-01-20 | End: 2023-01-21

## 2023-01-20 RX ORDER — ACETAMINOPHEN 650 MG/1
650 SUPPOSITORY RECTAL EVERY 6 HOURS PRN
Status: DISCONTINUED | OUTPATIENT
Start: 2023-01-20 | End: 2023-01-21 | Stop reason: HOSPADM

## 2023-01-20 RX ORDER — GABAPENTIN 600 MG/1
600 TABLET ORAL 3 TIMES DAILY
Status: DISCONTINUED | OUTPATIENT
Start: 2023-01-20 | End: 2023-01-20

## 2023-01-20 RX ORDER — SODIUM CHLORIDE 0.9 % (FLUSH) 0.9 %
5-40 SYRINGE (ML) INJECTION EVERY 12 HOURS SCHEDULED
Status: DISCONTINUED | OUTPATIENT
Start: 2023-01-20 | End: 2023-01-21 | Stop reason: HOSPADM

## 2023-01-20 RX ORDER — ACETAMINOPHEN 325 MG/1
650 TABLET ORAL ONCE
Status: DISCONTINUED | OUTPATIENT
Start: 2023-01-20 | End: 2023-01-20

## 2023-01-20 RX ORDER — GABAPENTIN 400 MG/1
400 CAPSULE ORAL 3 TIMES DAILY
Status: DISCONTINUED | OUTPATIENT
Start: 2023-01-20 | End: 2023-01-21 | Stop reason: HOSPADM

## 2023-01-20 RX ORDER — ONDANSETRON 2 MG/ML
4 INJECTION INTRAMUSCULAR; INTRAVENOUS EVERY 6 HOURS PRN
Status: DISCONTINUED | OUTPATIENT
Start: 2023-01-20 | End: 2023-01-21 | Stop reason: HOSPADM

## 2023-01-20 RX ORDER — ACETAMINOPHEN 325 MG/1
650 TABLET ORAL EVERY 6 HOURS PRN
Status: DISCONTINUED | OUTPATIENT
Start: 2023-01-20 | End: 2023-01-21 | Stop reason: HOSPADM

## 2023-01-20 RX ORDER — SODIUM CHLORIDE 0.9 % (FLUSH) 0.9 %
5-40 SYRINGE (ML) INJECTION PRN
Status: DISCONTINUED | OUTPATIENT
Start: 2023-01-20 | End: 2023-01-21 | Stop reason: HOSPADM

## 2023-01-20 RX ORDER — METOCLOPRAMIDE HYDROCHLORIDE 5 MG/ML
10 INJECTION INTRAMUSCULAR; INTRAVENOUS EVERY 6 HOURS
Status: DISCONTINUED | OUTPATIENT
Start: 2023-01-20 | End: 2023-01-21 | Stop reason: HOSPADM

## 2023-01-20 RX ORDER — DEXTROSE MONOHYDRATE 100 MG/ML
INJECTION, SOLUTION INTRAVENOUS CONTINUOUS PRN
Status: DISCONTINUED | OUTPATIENT
Start: 2023-01-20 | End: 2023-01-21 | Stop reason: HOSPADM

## 2023-01-20 RX ORDER — INSULIN LISPRO 100 [IU]/ML
0-4 INJECTION, SOLUTION INTRAVENOUS; SUBCUTANEOUS NIGHTLY
Status: DISCONTINUED | OUTPATIENT
Start: 2023-01-20 | End: 2023-01-20

## 2023-01-20 RX ADMIN — SODIUM CHLORIDE, POTASSIUM CHLORIDE, SODIUM LACTATE AND CALCIUM CHLORIDE: 600; 310; 30; 20 INJECTION, SOLUTION INTRAVENOUS at 23:27

## 2023-01-20 RX ADMIN — METOCLOPRAMIDE 10 MG: 5 INJECTION, SOLUTION INTRAMUSCULAR; INTRAVENOUS at 10:23

## 2023-01-20 RX ADMIN — METOCLOPRAMIDE 10 MG: 5 INJECTION, SOLUTION INTRAMUSCULAR; INTRAVENOUS at 06:22

## 2023-01-20 RX ADMIN — SODIUM CHLORIDE, PRESERVATIVE FREE 10 ML: 5 INJECTION INTRAVENOUS at 20:02

## 2023-01-20 RX ADMIN — ONDANSETRON 4 MG: 2 INJECTION INTRAMUSCULAR; INTRAVENOUS at 20:02

## 2023-01-20 RX ADMIN — ONDANSETRON 4 MG: 2 INJECTION INTRAMUSCULAR; INTRAVENOUS at 00:02

## 2023-01-20 RX ADMIN — SODIUM CHLORIDE, POTASSIUM CHLORIDE, SODIUM LACTATE AND CALCIUM CHLORIDE: 600; 310; 30; 20 INJECTION, SOLUTION INTRAVENOUS at 04:39

## 2023-01-20 RX ADMIN — ONDANSETRON 4 MG: 2 INJECTION INTRAMUSCULAR; INTRAVENOUS at 12:10

## 2023-01-20 RX ADMIN — SODIUM CHLORIDE 1000 ML: 9 INJECTION, SOLUTION INTRAVENOUS at 03:18

## 2023-01-20 RX ADMIN — METOCLOPRAMIDE 10 MG: 5 INJECTION, SOLUTION INTRAMUSCULAR; INTRAVENOUS at 17:20

## 2023-01-20 RX ADMIN — SODIUM CHLORIDE 1000 ML: 9 INJECTION, SOLUTION INTRAVENOUS at 02:50

## 2023-01-20 RX ADMIN — METOCLOPRAMIDE 10 MG: 5 INJECTION, SOLUTION INTRAMUSCULAR; INTRAVENOUS at 23:25

## 2023-01-20 RX ADMIN — ACETAMINOPHEN 1000 MG: 500 TABLET ORAL at 02:24

## 2023-01-20 RX ADMIN — SODIUM CHLORIDE, POTASSIUM CHLORIDE, SODIUM LACTATE AND CALCIUM CHLORIDE: 600; 310; 30; 20 INJECTION, SOLUTION INTRAVENOUS at 15:53

## 2023-01-20 ASSESSMENT — ENCOUNTER SYMPTOMS
EYE ITCHING: 0
CHEST TIGHTNESS: 0
COLOR CHANGE: 0
CHOKING: 0
SINUS PRESSURE: 0
EYE REDNESS: 0
WHEEZING: 0
SORE THROAT: 0
NAUSEA: 1
DIARRHEA: 0
CONSTIPATION: 0
PHOTOPHOBIA: 0
ABDOMINAL DISTENTION: 0
ABDOMINAL PAIN: 0
SHORTNESS OF BREATH: 0
VOMITING: 1
EYE PAIN: 0
BLOOD IN STOOL: 0
EYE DISCHARGE: 0
VOICE CHANGE: 0
TROUBLE SWALLOWING: 0
BACK PAIN: 0
RHINORRHEA: 0
COUGH: 0

## 2023-01-20 ASSESSMENT — PAIN SCALES - GENERAL
PAINLEVEL_OUTOF10: 8

## 2023-01-20 ASSESSMENT — VISUAL ACUITY: OU: 1

## 2023-01-20 ASSESSMENT — PAIN DESCRIPTION - LOCATION
LOCATION: HEAD;BACK;NECK
LOCATION: BACK;HEAD;NECK

## 2023-01-20 ASSESSMENT — PAIN DESCRIPTION - DESCRIPTORS
DESCRIPTORS: ACHING
DESCRIPTORS: ACHING

## 2023-01-20 ASSESSMENT — PAIN - FUNCTIONAL ASSESSMENT: PAIN_FUNCTIONAL_ASSESSMENT: 0-10

## 2023-01-20 ASSESSMENT — PAIN DESCRIPTION - PAIN TYPE
TYPE: ACUTE PAIN
TYPE: ACUTE PAIN

## 2023-01-20 NOTE — ED NOTES
PT resting in bed. Respirations even and unlabored. PT and VS assessed. Call light in reach.      Donavon Perez RN  01/20/23 3660

## 2023-01-20 NOTE — PLAN OF CARE
Problem: Pain  Goal: Verbalizes/displays adequate comfort level or baseline comfort level  0/69/5588 [id number] by Savannah Talley RN  Outcome: Progressing  Flowsheets (Taken 1/20/2023 1655)  Verbalizes/displays adequate comfort level or baseline comfort level: Encourage patient to monitor pain and request assistance     Problem: Safety - Adult  Goal: Free from fall injury  9/71/2441 0769 by Savannah Talley RN  Outcome: Progressing  Flowsheets (Taken 1/20/2023 1655)  Free From Fall Injury: Based on caregiver fall risk screen, instruct family/caregiver to ask for assistance with transferring infant if caregiver noted to have fall risk factors     Problem: Chronic Conditions and Co-morbidities  Goal: Patient's chronic conditions and co-morbidity symptoms are monitored and maintained or improved  Outcome: Progressing  Flowsheets (Taken 1/20/2023 1014)  Care Plan - Patient's Chronic Conditions and Co-Morbidity Symptoms are Monitored and Maintained or Improved:   Collaborate with multidisciplinary team to address chronic and comorbid conditions and prevent exacerbation or deterioration   Monitor and assess patient's chronic conditions and comorbid symptoms for stability, deterioration, or improvement   Care plan reviewed with patient. Patient verbalizes understanding of the plan of care and contributes to goal setting.

## 2023-01-20 NOTE — ED NOTES
PT resting in bed. PT and VS assessed. Respirations even and unlabored. Call light in reach.      Tanvir Alarcon RN  01/20/23 7875

## 2023-01-20 NOTE — ED PROVIDER NOTES
[facility]  eMERGENCY dEPARTMENT eNCOUnter          CHIEF COMPLAINT       Chief Complaint   Patient presents with    Nausea    Headache       Nurses Notes reviewed and I agree except as noted in the HPI. HISTORY OF PRESENT ILLNESS    Codie Ford is a 50 y.o. male who presents for generalized body aches, feeling ill, low-grade fevers nausea vomiting malaise onset approximately 3 weeks. Patient has not followed up with his primary care physician. Patient has not taken anything for this. Patient is also complaining of headaches. Patient is a type I diabetic. Patient states he is taking all his medications as prescribed. Patient states that he has had a hard time eating and drinking. Patient denies any alcohol or drugs. Patient is able to ambulate without difficulty. Patient denies any chest pain shortness of breath. He denies any changes in bowel or bladder habits. No blood in his urine no blood in the stool. Patient is otherwise resting comfortably on the cot no apparent distress no other physical complaints at this time    REVIEW OF SYSTEMS     Review of Systems   Constitutional:  Positive for chills, fatigue and fever. Negative for activity change, appetite change, diaphoresis and unexpected weight change. HENT:  Negative for congestion, ear discharge, ear pain, hearing loss, rhinorrhea, sinus pressure, sore throat, trouble swallowing and voice change. Eyes:  Negative for photophobia, pain, discharge, redness and itching. Respiratory:  Negative for cough, choking, chest tightness, shortness of breath and wheezing. Cardiovascular:  Negative for chest pain, palpitations and leg swelling. Gastrointestinal:  Positive for nausea and vomiting. Negative for abdominal distention, abdominal pain, blood in stool, constipation and diarrhea. Endocrine: Negative for polydipsia, polyphagia and polyuria.    Genitourinary:  Negative for decreased urine volume, difficulty urinating, dysuria, enuresis, frequency, hematuria, penile swelling, scrotal swelling, testicular pain and urgency. Musculoskeletal:  Negative for arthralgias, back pain, gait problem, myalgias, neck pain and neck stiffness. Skin:  Negative for color change, pallor, rash and wound. Allergic/Immunologic: Negative for immunocompromised state. Neurological:  Positive for headaches. Negative for dizziness, tremors, seizures, syncope, facial asymmetry, weakness, light-headedness and numbness. Hematological:  Negative for adenopathy. Does not bruise/bleed easily. Psychiatric/Behavioral:  Negative for agitation, behavioral problems, confusion, hallucinations and suicidal ideas. The patient is not nervous/anxious. PAST MEDICAL HISTORY    has a past medical history of Diabetes mellitus ([facility] [address] SURGICAL HISTORY      has a past surgical history that includes Abdomen surgery; eye surgery; Cataract removal (Bilateral); and Dental surgery. CURRENT MEDICATIONS       Current Discharge Medication List        CONTINUE these medications which have NOT CHANGED    Details   gabapentin (NEURONTIN) 400 MG capsule Take 800 mg by mouth 3 times daily. DULoxetine (CYMBALTA) 30 MG extended release capsule Take 60 mg by mouth daily      hydrOXYzine (ATARAX) 25 MG tablet Take 25 mg by mouth nightly      simvastatin (ZOCOR) 20 MG tablet Take 1 tablet by mouth nightly  Qty: 30 tablet, Refills: 3      metoprolol tartrate (LOPRESSOR) 25 MG tablet Take 1 tablet by mouth 2 times daily  Qty: 60 tablet, Refills: 3      Insulin Pump KAI Inject 1 each into the skin See Admin Instructions      cyanocobalamin 1000 MCG tablet Take 1,000 mcg by mouth daily. gabapentin (NEURONTIN) 600 MG tablet Take 600 mg by mouth 3 times daily. ALLERGIES     has No Known Allergies. FAMILY HISTORY     He indicated that his mother is alive. He indicated that the status of his maternal grandfather is unknown.  He indicated that the status of his neg hx is unknown.   family history includes Colon Cancer in his maternal grandfather. SOCIAL HISTORY      reports that he has been smoking cigarettes. He has been smoking an average of .5 packs per day. He has never used smokeless tobacco. He reports that he does not currently use drugs after having used the following drugs: Marijuana (Weed) and Opiates . He reports that he does not drink alcohol. PHYSICAL EXAM     INITIAL VITALS:  height is 5' 10\" (1.778 m) and weight is 130 lb (59 kg). His oral temperature is 98.5 °F (36.9 °C). His blood pressure is 134/78 and his pulse is 81. His respiration is 18 and oxygen saturation is 100%. Physical Exam  Vitals and nursing note reviewed. Constitutional:       General: He is not in acute distress. Appearance: He is well-developed. He is not diaphoretic. HENT:      Head: Normocephalic and atraumatic. Right Ear: Hearing, tympanic membrane, ear canal and external ear normal.      Left Ear: Hearing, tympanic membrane, ear canal and external ear normal.      Nose: Nose normal.   Eyes:      General: Lids are normal. Vision grossly intact. Gaze aligned appropriately. No scleral icterus. Right eye: No discharge. Left eye: No discharge. Extraocular Movements: Extraocular movements intact. Conjunctiva/sclera: Conjunctivae normal.      Right eye: No exudate. Left eye: No exudate. Pupils: Pupils are equal, round, and reactive to light. Neck:      Thyroid: No thyromegaly. Vascular: No JVD. Trachea: No tracheal deviation. Cardiovascular:      Rate and Rhythm: Normal rate and regular rhythm. Pulses: Normal pulses. Carotid pulses are 2+ on the right side and 2+ on the left side. Radial pulses are 2+ on the right side and 2+ on the left side. Femoral pulses are 2+ on the right side and 2+ on the left side. Popliteal pulses are 2+ on the right side and 2+ on the left side. Dorsalis pedis pulses are 2+ on the right side and 2+ on the left side. Posterior tibial pulses are 2+ on the right side and 2+ on the left side. Heart sounds: Normal heart sounds, S1 normal and S2 normal. No murmur heard. No friction rub. No gallop. Pulmonary:      Effort: Pulmonary effort is normal. No respiratory distress. Breath sounds: Normal breath sounds. No stridor. No decreased breath sounds, wheezing, rhonchi or rales. Chest:      Chest wall: No tenderness. Abdominal:      General: Bowel sounds are normal. There is no distension. Palpations: Abdomen is soft. There is no mass. Tenderness: There is no abdominal tenderness. There is no guarding or rebound. Musculoskeletal:         General: No tenderness. Normal range of motion. Cervical back: Normal range of motion and neck supple. Normal range of motion. Right lower leg: No edema. Left lower leg: No edema. Lymphadenopathy:      Cervical: No cervical adenopathy. Skin:     General: Skin is warm and dry. Capillary Refill: Capillary refill takes less than 2 seconds. Findings: No bruising, ecchymosis, lesion or rash. Neurological:      General: No focal deficit present. Mental Status: He is alert and oriented to person, place, and time. Cranial Nerves: No cranial nerve deficit. Sensory: No sensory deficit. Coordination: Coordination normal.      Deep Tendon Reflexes: Reflexes are normal and symmetric. Psychiatric:         Speech: Speech normal.         Behavior: Behavior normal.         Thought Content:  Thought content normal.         Judgment: Judgment normal.         DIFFERENTIAL DIAGNOSIS:   DKA hyperglycemia dehydration viral illness electrolyte disorder    DIAGNOSTIC RESULTS     EKG: All EKG's are interpreted by the Emergency Department Physician who either signs or Co-signs this chart in the absence of a cardiologist.  Normal sinus rhythm, normal axis, ventricular rate of 83 LA interval 132 QRS duration 84 QT interval 358 QTC of 420    RADIOLOGY: non-plain film images(s) such as CT, Ultrasound and MRI are read by the radiologist.  CT HEAD WO CONTRAST   Final Result   Impression:      No acute intracranial abnormality. This document has been electronically signed by: Meme Boykin MD on    01/20/2023 12:43 AM      All CTs at this facility use dose modulation techniques and iterative    reconstructions, and/or weight-based dosing   when appropriate to reduce radiation to a low as reasonably achievable. XR CHEST PORTABLE   Final Result   1. No acute findings.       This document has been electronically signed by: Meme Boykin MD on    01/20/2023 12:05 AM            LABS:   Labs Reviewed   CBC WITH AUTO DIFFERENTIAL - Abnormal; Notable for the following components:       Result Value    MCHC 36.3 (*)     RDW-SD 34.7 (*)     All other components within normal limits   BASIC METABOLIC PANEL - Abnormal; Notable for the following components:    Sodium 133 (*)     Chloride 94 (*)     Glucose 193 (*)     BUN 36 (*)     Creatinine 1.3 (*)     All other components within normal limits   BETA-HYDROXYBUTYRATE - Abnormal; Notable for the following components:    Beta-Hydroxybutyrate 29.10 (*)     All other components within normal limits   URINE WITH REFLEXED MICRO - Abnormal; Notable for the following components:    Glucose, Ur 500 (*)     Ketones, Urine 80 (*)     Protein,  (*)     All other components within normal limits   POCT GLUCOSE - Abnormal; Notable for the following components:    POC Glucose 196 (*)     All other components within normal limits   POCT GLUCOSE - Abnormal; Notable for the following components:    POC Glucose 145 (*)     All other components within normal limits   COVID-19 & INFLUENZA COMBO   BRAIN NATRIURETIC PEPTIDE   HEPATIC FUNCTION PANEL   LIPASE   TROPONIN   MAGNESIUM   TSH   ETHANOL   URINE DRUG SCREEN   BLOOD GAS, ARTERIAL   ANION GAP GLOMERULAR FILTRATION RATE, ESTIMATED   OSMOLALITY   BASIC METABOLIC PANEL W/ REFLEX TO MG FOR LOW K   BASIC METABOLIC PANEL W/ REFLEX TO MG FOR LOW K   POCT GLUCOSE   POCT GLUCOSE   POCT GLUCOSE   POCT GLUCOSE   POCT GLUCOSE   POCT GLUCOSE       EMERGENCY DEPARTMENT COURSE:   Vitals:    Vitals:    01/20/23 0224 01/20/23 0316 01/20/23 0401 01/20/23 0434   BP: 132/71 (!) 126/93 130/87 134/78   Pulse: 85 79 79 81   Resp: 11 12 13 18   Temp:    98.5 °F (36.9 °C)   TempSrc:    Oral   SpO2: 100% 98% 100% 100%   Weight:       Height:         Patient was assessed at bedside labs and imaging were ordered. Patient's blood sugar was 196. Patient was orthostatic hypotensive. Fluids were started. Here today is obvious that the patient is dehydrated. Patient's blood glucose was 196. Hemoglobin and hematocrit are slightly elevated, indicating possible hemoconcentration, BUN and creatinine are slightly elevated at 36 and 1.3 indicating some dehydration. Troponins are negative. COVID and influenza negative. His beta hydroxybutyrate is 29.1 but the patient has no anion gap. ABG was not indicative of an acidosis with a pH of 7.41. This is most likely dehydration. Patient was given multiple bags of fluid because he states he was \"unable to keep medications down\". However the patient states in another sentence that he was able to take all his other medications. Here today the patient was still having mild bouts of nausea. He is a type I diabetic. At this point I feel that he needs to stay to get his hydration down and that he is able to eat and drink. I called the hospitalist group and discussed case with them. They graciously excepted the admission. Patient is admitted in stable condition pending further evaluation and treatment. CRITICAL CARE:   None    CONSULTS:  Hospitalist    PROCEDURES:  None    FINAL IMPRESSION      1. Uncontrolled type 1 diabetes mellitus with hyperglycemia (HCC)    2. Dehydration    3. Nausea and vomiting, unspecified vomiting type          DISPOSITION/PLAN   Admission    PATIENT REFERRED TO:  No follow-up provider specified.     DISCHARGE MEDICATIONS:  Current Discharge Medication List          (Please note that portions of this note were completed with a voice recognition program.  Efforts were made to edit the dictations but occasionally words are mis-transcribed.)    Lexie Sanz, [address] [place] [address],   01/20/23 8335

## 2023-01-20 NOTE — ED NOTES
PT resting in bed. Respirations even and unlabored. PT and VS assessed. PT requesting medication for headache. RN will notify provider.       Donavon Perez RN  01/20/23 0964

## 2023-01-20 NOTE — ED NOTES
PT resting in bed alert. PT and VS assessed. PT updated on POC. Call light in reach.      Tanvir Alarcon RN  01/20/23 8313

## 2023-01-20 NOTE — ED NOTES
PT resting in bed. Respirations are even and unlabored. PT and VS assessed. Call light in reach.      Virginia Cedillo RN  01/20/23 8053

## 2023-01-20 NOTE — ED NOTES
PT resting in bed. Respirations even and unlabored. PT medicated per MAR. PT and VS assessed. POCT 145.      Michaela Linares RN  01/20/23 9056

## 2023-01-20 NOTE — PLAN OF CARE
Problem: Pain  Goal: Verbalizes/displays adequate comfort level or baseline comfort level  Outcome: Progressing     Problem: Safety - Adult  Goal: Free from fall injury  Outcome: Progressing   Care plan reviewed with patient. Patient verbalizes understanding of the plan of care and contribute to goal setting.

## 2023-01-20 NOTE — ED NOTES
ED to inpatient nurses report    Chief Complaint   Patient presents with    Nausea    Headache      Present to ED from home  LOC: alert and orientated to name, place, date  Vital signs   Vitals:    01/20/23 0127 01/20/23 0213 01/20/23 0224 01/20/23 0316   BP: (!) 149/82 129/67 132/71 (!) 126/93   Pulse: 77 91 85 79   Resp: 13 16 11 12   Temp:       TempSrc:       SpO2: 98% 100% 100% 98%   Weight:       Height:          Oxygen Baseline RA    Current needs required RA Bipap/Cpap No  LDAs:   Peripheral IV 01/19/23 Right Antecubital (Active)   Site Assessment Clean, dry & intact 01/19/23 2349   Line Status Normal saline locked 01/19/23 2349   Phlebitis Assessment No symptoms 01/19/23 2349   Infiltration Assessment 0 01/19/23 2349     Mobility: Requires assistance * 1  Pending ED orders: None  Present condition: stable    C-SSRS Risk of Suicide: No Risk  Swallow Screening    Preferred Language: [place]     Electronically signed by Alejandra Waddell RN on 1/20/2023 at 4:01 AM\       Alejandra Waddell RN  01/20/23 0401

## 2023-01-20 NOTE — CARE COORDINATION
Case Management Assessment  Initial Evaluation    Date/Time of Evaluation: 1/20/2023 2:19 PM  Assessment Completed by: Mary Gleason RN    If patient is discharged prior to next notation, then this note serves as note for discharge by case management. Patient Name: Terry Fisher                   YOB: 1975  Diagnosis: Dehydration [E86.0]  Nausea & vomiting [R11.2]  Uncontrolled type 1 diabetes mellitus with hyperglycemia ([facility] [address] [E10.65]  Nausea and vomiting, unspecified vomiting type [R11.2]                   Date / Time: 1/19/2023 11:37 PM  Location: [facility]19/019-A     Patient Admission Status: Observation   Readmission Risk (Low < 19, Mod (19-27), High > 27): No data recorded  Current PCP: TL Plaza, MSN, APRN - CNP  PCP verified by ? Yes    Chart Reviewed: Yes      History Provided by:    Patient Orientation: Alert and Oriented    Patient Cognition: Alert    Hospitalization in the last 30 days (Readmission):  No    If yes, Readmission Assessment in  Navigator will be completed. Advance Directives:      Code Status: Full Code   Patient's Primary Decision Maker is:        Discharge Planning:    Patient lives with: Parent Type of Home: House  Primary Care Giver:    Patient Support Systems include: Parent, Family Members   Current Financial resources: [place] ([place])  Current community resources: None  Current services prior to admission: None            Current DME:              Type of Home Care services:  None    ADLS  Prior functional level: Independent in ADLs/IADLs  Current functional level: Independent in ADLs/IADLs    Family can provide assistance at DC: Yes  Would you like Case Management to discuss the discharge plan with any other family members/significant others, and if so, who?  No  Plans to Return to Present Housing: Yes  Other Identified Issues/Barriers to RETURNING to current housing: None  Potential Assistance needed at discharge: N/A            Potential DME:    Patient expects to discharge to: [facility] for transportation at discharge: Self    Financial    Payor: Praveen Come / Plan: Praveen Come / Product Type: *No Product type* /     Does insurance require precert for SNF: Yes    Potential assistance Purchasing Medications: No  Meds-to-Beds request: No      [address], [address]  [address]  [address] [facility] [address]  Phone: [phone] Fax: [phone]    [address], [place] - [address]  [address] [place] [address] [phone] [place]on [phone]  [address]  [facility] [address] [facility] [address]  Phone: [phone] Fax: [phone]      Notes:    Factors facilitating achievement of predicted outcomes: Family support, Cooperative, and Pleasant    Barriers to discharge: Medical complications and Medication managment    Additional Case Management Notes: Pt admitted through ER with c/o body aches, nausea and headache. LR infusing-had NS bolus in ER, on tele, creat 1.0    Procedure: 1/20: CXR: No acute findings  1/20: CT head: No acute intracranial abnormality    The Plan for Transition of Care is related to the following treatment goals of Dehydration [E86.0]  Nausea & vomiting [R11.2]  Uncontrolled type 1 diabetes mellitus with hyperglycemia (HCC) [E10.65]  Nausea and vomiting, unspecified vomiting type [R11.2]    Patient Goals/Plan/Treatment Preferences: Spoke with pt. He lives home with his mother Devon Joe. Independent in ADL's. Able to drive. Verified PCP and insurance. Will continue to follow for additional home going discharge needs. Transportation/Food Security/Housekeeping Addressed: No issues identified.      Ame Betancourt RN  Case Management Department

## 2023-01-20 NOTE — ED TRIAGE NOTES
PT to the ED via EMS with complaint of sickness on and off for the past 3 weeks. PT states he has had nausea, vomiting, malaise  and generalized body pains for about three weeks. PT states that. PT denies chest pain. PT states he currently has a head ache. Respirations are even and unlabored. POCT 196.  PT received 4 mg zofran in route by EMS

## 2023-01-20 NOTE — H&P
History & Physical      Patient: Sharath Romano  YOB: 1975    MRN: [id number]  Acct: [de-identified]    PCP: Doris Pedroza, LARONP, MSN, APRN - CNP    Date of Admission: 1/19/2023    Date of Service: Patient seen / examined on 01/20/23 and admitted to Observation with expected LOS less than two midnights due to medical therapy. ASSESSMENT / PLAN:  ?acute on chronic diabetic gastroparesis - Reports intractable nausea and vomiting for 3 weeks with only a few episodes of emesis. Last emesis was \"days ago\". No known sick contacts, no travel, no change in bowel/bladder habits. Decreased bowel sounds, no s/s of mechanical obstruction.  - Continue last bolus of NS then transition to LR at 125cc/hr  - Discontinue Zofran  - Metoclopramide 10mg every 6 hours  - BMP in morning    Stage 1 ELIESER - Cr=1.3 on baseline of ~1. Pre-renal etiology as evidenced by BUN:Cr>20:1, elevated specific gravity (1.026). - Continue IVF as above  - Avoid nephrotoxic agents and renally dose medications  - Strict I/O's  - Daily BMP     Type 1 diabetes - Not in DKA. Elevated beta-hydroxybutyrate is likely from dehydration. Insulin pump at home with regimen of 1unit/hr continuous and patient administers bolus at meal time with sliding scale coverage.  Care is managed at the [facility].  - Observe for s/s of DKA with elevated Beta-hydroxybutyrate  - Continue home insulin pump at current rate of 1unit/hr  - POCT glucose now then every 2 hours for 6 occurrences; transition to 4 times daily if eating  - Hypoglycemia protocol  - Adult diet with 4 carb choices  - Resume Gabapentin for neuropathy    Primary HTN - Currently volume depleted with soft pressures  - Hold home Lopressor    HLD - Resume statin    Depression/Anxiety - Resume Duloxetine      Chief Complaint: Nausa, vomiting, malaise    History of Present Illness:  Sharath Romano is a 50 y.o. male with PMHx of Type 1 diabetes, primary HTN, HLD, and depression who presented to [facility] [address] with complaint of nausea, vomiting and malaise for the past 3 weeks. He stated that he's only had a few episodes of vomiting but a considerable amount of dry heaves. He has generalized abdominal discomfort. He tried to manage much of this at home and has been able to eat intermittently, but it became unmanageable. He also stated that he recently has new onset headache and subjective fevers. He denies chills, lightheadedness, change in vision, rhinorrhea, congestion, sore throat, cough, hemoptysis, chest pain, palpitations, BUTT, PND, shortness of breath, hematemesis, change in bowel/bladder habits, hematochezia, hematuria, weakness, difficulty with ambulation, numbness/tingling, or known exposure to sick contacts. ED course: CXR without acute findings, CT head without acute intracranial findings, 3L normal saline, 1g Tylenol, and 4mg Zofran      The patient was admitted to the hospital service for further care and management. Please see A&P above for additional information. Past Medical History:  Past Medical History:   Diagnosis Date    Diabetes mellitus ([address]        Past Surgical History:  Past Surgical History:   Procedure Laterality Date    ABDOMINAL SURGERY      CATARACT REMOVAL Bilateral     DENTAL SURGERY      EYE SURGERY         Medications Prior to Admission:  No current facility-administered medications on file prior to encounter. Current Outpatient Medications on File Prior to Encounter   Medication Sig Dispense Refill    DULoxetine (CYMBALTA) 30 MG extended release capsule Take 60 mg by mouth daily      hydrOXYzine (ATARAX) 25 MG tablet Take 25 mg by mouth nightly      simvastatin (ZOCOR) 20 MG tablet Take 1 tablet by mouth nightly 30 tablet 3    metoprolol tartrate (LOPRESSOR) 25 MG tablet Take 1 tablet by mouth 2 times daily 60 tablet 3    Insulin Pump KAI Inject 1 each into the skin See Admin Instructions      cyanocobalamin 1000 MCG tablet Take 1,000 mcg by mouth daily. gabapentin (NEURONTIN) 600 MG tablet Take 600 mg by mouth 3 times daily. Allergies:  Patient has no known allergies. Social History:  Social History     Socioeconomic History    Marital status: Single     Spouse name: Not on file    Number of children: Not on file    Years of education: Not on file    Highest education level: Not on file   Occupational History    Not on file   Tobacco Use    Smoking status: Every Day     Packs/day: 0.50     Types: Cigarettes    Smokeless tobacco: Never   Vaping Use    Vaping Use: Never used   Substance and Sexual Activity    Alcohol use: No    Drug use: Not Currently     Types: Marijuana (Terry Johnson), Opiates      Comment: states last use 2 years ago from 5/26/19. Sexual activity: Yes   Other Topics Concern    Not on file   Social History Narrative    Not on file     Social Determinants of Health     Financial Resource Strain: Not on file   Food Insecurity: Not on file   Transportation Needs: Not on file   Physical Activity: Not on file   Stress: Not on file   Social Connections: Not on file   Intimate Partner Violence: Not on file   Housing Stability: Not on file       Family History:   Family History   Problem Relation Age of Onset    Colon Cancer Maternal Grandfather         possibly    Colon Polyps Neg Hx        REVIEW OF SYSTEMS:  A 14-point ROS was obtained and negative, with the exception of pertinent positives as listed above in HPI. PHYSICAL EXAM:  Vitals:    01/20/23 0127 01/20/23 0213 01/20/23 0224 01/20/23 0316   BP: (!) 149/82 129/67 132/71 (!) 126/93   Pulse: 77 91 85 79   Resp: 13 16 11 12   Temp:       TempSrc:       SpO2: 98% 100% 100% 98%   Weight:       Height:         Physical Exam  Vitals and nursing note reviewed. Constitutional:       General: He is awake. He is in acute distress. Appearance: Normal appearance. He is normal weight. He is ill-appearing. He is not toxic-appearing or diaphoretic.    HENT:      Head: Normocephalic and atraumatic. Right Ear: External ear normal.      Left Ear: External ear normal.      Nose: Nose normal.      Mouth/Throat:      Mouth: Mucous membranes are moist.      Pharynx: Oropharynx is clear. Eyes:      Conjunctiva/sclera: Conjunctivae normal.      Pupils: Pupils are equal, round, and reactive to light. Cardiovascular:      Rate and Rhythm: Normal rate and regular rhythm. Pulses: Normal pulses. Dorsalis pedis pulses are 2+ on the right side and 2+ on the left side. Posterior tibial pulses are 2+ on the right side and 2+ on the left side. Heart sounds: Normal heart sounds, S1 normal and S2 normal.   Pulmonary:      Effort: Pulmonary effort is normal.      Breath sounds: Normal breath sounds. No decreased breath sounds, wheezing, rhonchi or rales. Abdominal:      General: Bowel sounds are decreased. Palpations: Abdomen is soft. Tenderness: There is generalized abdominal tenderness. There is no right CVA tenderness or left CVA tenderness. Musculoskeletal:         General: Normal range of motion. Cervical back: Normal range of motion and neck supple. Right lower leg: No edema. Left lower leg: No edema. Skin:     General: Skin is warm and dry. Capillary Refill: Capillary refill takes less than 2 seconds. Neurological:      General: No focal deficit present. Mental Status: He is alert and oriented to person, place, and time. Mental status is at baseline. GCS: GCS eye subscore is 4. GCS verbal subscore is 5. GCS motor subscore is 6. Cranial Nerves: Cranial nerves 2-12 are intact. Sensory: Sensation is intact. Motor: Motor function is intact. Coordination: Coordination is intact. Gait: Gait is intact.    Psychiatric:         Attention and Perception: Attention and perception normal.         Mood and Affect: Mood and affect normal.         Speech: Speech normal.         Behavior: Behavior normal. Behavior is cooperative. Thought Content:  Thought content normal.         Cognition and Memory: Cognition and memory normal.         Judgment: Judgment normal.         Labs:  Results for orders placed or performed during the hospital encounter of 01/19/23   COVID-19 & Influenza Combo    Specimen: Nasopharyngeal Swab   Result Value Ref Range    SARS-CoV-2 RNA, RT PCR NOT DETECTED NOT DETECTED    INFLUENZA A NOT DETECTED NOT DETECTED    INFLUENZA B NOT DETECTED NOT DETECTED   CBC with Auto Differential   Result Value Ref Range    WBC 8.6 4.8 - 10.8 thou/mm3    RBC 5.11 4.70 - 6.10 mill/mm3    Hemoglobin 15.3 14.0 - 18.0 gm/dl    Hematocrit 42.2 42.0 - 52.0 %    MCV 82.6 80.0 - 94.0 fL    MCH 29.9 26.0 - 33.0 pg    MCHC 36.3 (H) 32.2 - 35.5 gm/dl    RDW-CV 11.6 11.5 - 14.5 %    RDW-SD 34.7 (L) 35.0 - 45.0 fL    Platelets 806 339 - 724 thou/mm3    MPV 10.7 9.4 - 12.4 fL    Seg Neutrophils 79.8 %    Lymphocytes 12.0 %    Monocytes 7.6 %    Eosinophils 0.2 %    Basophils 0.2 %    Immature Granulocytes 0.2 %    Segs Absolute 6.9 1.8 - 7.7 thou/mm3    Lymphocytes Absolute 1.0 1.0 - 4.8 thou/mm3    Monocytes Absolute 0.7 0.4 - 1.3 thou/mm3    Eosinophils Absolute 0.0 0.0 - 0.4 thou/mm3    Basophils Absolute 0.0 0.0 - 0.1 thou/mm3    Immature Grans (Abs) 0.02 0.00 - 0.07 thou/mm3    nRBC 0 /100 wbc   Brain Natriuretic Peptide   Result Value Ref Range    Pro-.6 0.0 - 124.0 pg/mL   Basic Metabolic Panel   Result Value Ref Range    Sodium 133 (L) 135 - 145 meq/L    Potassium 3.7 3.5 - 5.2 meq/L    Chloride 94 (L) 98 - 111 meq/L    CO2 25 23 - 33 meq/L    Glucose 193 (H) 70 - 108 mg/dL    BUN 36 (H) 7 - 22 mg/dL    Creatinine 1.3 (H) 0.4 - 1.2 mg/dL    Calcium 8.7 8.5 - 10.5 mg/dL   Hepatic Function Panel   Result Value Ref Range    Albumin 4.0 3.5 - 5.1 g/dL    Total Bilirubin 1.0 0.3 - 1.2 mg/dL    Bilirubin, Direct 0.3 0.0 - 0.3 mg/dL    Alkaline Phosphatase 69 38 - 126 U/L    AST 19 5 - 40 U/L    ALT 13 11 - 66 U/L Total Protein 6.4 6.1 - 8.0 g/dL   Lipase   Result Value Ref Range    Lipase 12.4 5.6 - 51.3 U/L   Troponin   Result Value Ref Range    Troponin T < 0.010 ng/ml   Magnesium   Result Value Ref Range    Magnesium 2.2 1.6 - 2.4 mg/dL   TSH   Result Value Ref Range    TSH 0.745 0.400 - 4.200 uIU/mL   Ethanol   Result Value Ref Range    ETHYL ALCOHOL, SERUM < 0.01 0.00 %   Urine Drug Screen   Result Value Ref Range    Amphetamine+Methamphetamine Urine Screen Negative NEGATIVE    Barbiturate Quant, Ur Negative NEGATIVE    Benzodiazepine Quant, Ur Negative NEGATIVE    Cannabinoid Quant, Ur Negative NEGATIVE    Cocaine Metab Quant, Ur Negative NEGATIVE    Opiates, Urine Negative NEGATIVE    Oxycodone Negative NEGATIVE    PCP Quant, Ur Negative NEGATIVE    Fentanyl Negative NEGATIVE   Blood gas, arterial   Result Value Ref Range    pH, Blood Gas 7.41 7.35 - 7.45    PCO2 37 35 - 45 mmhg    PO2 82 71 - 104 mmhg    HCO3 24 23 - 28 mmol/l    Base Excess (Calculated) -0.5 -2.5 - 2.5 mmol/l    O2 Sat 96 %    IFIO2 21     DEVICE Room [employer] Test Positive     Source: L Radial     COLLECTED BY: [id number]    Beta-Hydroxybutyrate   Result Value Ref Range    Beta-Hydroxybutyrate 29.10 (H) 0.20 - 2.81 mg/dl   Anion Gap   Result Value Ref Range    Anion Gap 14.0 8.0 - 16.0 meq/L   Glomerular Filtration Rate, Estimated   Result Value Ref Range    Est, Glom Filt Rate >60 >60 ml/min/1.73m2   Osmolality   Result Value Ref Range    Osmolality Calc 280.0 275.0 - 300.0 mOsmol/kg   Urine with Reflexed Micro   Result Value Ref Range    Glucose, Ur 500 (A) NEGATIVE mg/dl    Bilirubin Urine NEGATIVE NEGATIVE    Ketones, Urine 80 (A) NEGATIVE    Specific Gravity, Urine 1.026 1.002 - 1.030    Blood, Urine NEGATIVE NEGATIVE    pH, UA 6.5 5.0 - 9.0    Protein,  (A) NEGATIVE    Urobilinogen, Urine 1.0 0.0 - 1.0 eu/dl    Nitrite, Urine NEGATIVE NEGATIVE    Leukocyte Esterase, Urine NEGATIVE NEGATIVE    Color, UA YELLOW STRAW-YELLOW    Character, Urine CLEAR CLEAR-SL CLOUD    RBC, UA 3-5 0-2/hpf /hpf    WBC, UA 0-2 0-4/hpf /hpf    Epithelial Cells, UA 0-2 3-5/hpf /hpf    Bacteria, UA NONE SEEN FEW/NONE SEEN /hpf    Casts UA NONE SEEN NONE SEEN /lpf    Crystals, UA NONE SEEN NONE SEEN    Renal Epithelial, UA NONE SEEN NONE SEEN    Yeast, UA NONE SEEN NONE SEEN    CASTS 2 NONE SEEN NONE SEEN /lpf    MISCELLANEOUS 2 NONE SEEN    POCT glucose   Result Value Ref Range    POC Glucose 196 (H) 70 - 108 mg/dl   POCT glucose   Result Value Ref Range    POC Glucose 145 (H) 70 - 108 mg/dl   EKG Nausea   Result Value Ref Range    Ventricular Rate 83 BPM    Atrial Rate 83 BPM    P-R Interval 132 ms    QRS Duration 84 ms    Q-T Interval 358 ms    QTc Calculation (Bazett) 420 ms    P Axis 79 degrees    R Axis 87 degrees    T Axis 76 degrees       EKG / Radiology:    EKG:  Reviewed by me --    CXR:  Reviewed by me --    CT HEAD WO CONTRAST    Result Date: 1/20/2023  CT head without contrast Comparison: CT - CT HEAD WO CONTR - 07/09/2017 03:49 AM EDT Findings: No acute intracranial hemorrhage. No acute territorial vascular infarction. No mass or mass-effect. No ventriculomegaly. Parenchymal volume loss. Diffuse hypoattenuation in the white matter, nonspecific but likely due to chronic small vessel ischemia. Atherosclerotic calcifications of the carotid siphons. Orbits are unremarkable. The paranasal sinuses and mastoid cells are clear. No acute bony abnormality. Unremarkable soft tissues. Impression: No acute intracranial abnormality. This document has been electronically signed by: Lexie Palomares MD on 01/20/2023 12:43 AM All CTs at this facility use dose modulation techniques and iterative reconstructions, and/or weight-based dosing when appropriate to reduce radiation to a low as reasonably achievable. XR CHEST PORTABLE    Result Date: 1/20/2023  1 view chest x-ray Comparison: CR/SR - XR CHEST PORTABLE - 07/26/2020 08:15 PM EDT Findings: The lungs are clear.  Normal size heart. No acute fracture. 1. No acute findings. This document has been electronically signed by: Davis Adamson MD on 01/20/2023 12:05 AM      FEN/GI/DVT:  IVF: LR @ 125cc/Hr  Electrolytes: Monitor and replace per protocols  Diet: Diabetic  GI PPX: No  DVT Prophylaxis: SCDs    CODE STATUS:  Full    Active Hospital Problems    Diagnosis Date Noted    Nausea & vomiting [R11.2] 01/20/2023     Priority: Medium     Thank you TL Castro, MSN, APRN - CNP for the opportunity to be involved in this patient's care.     Electronically signed by Mariela Lopez DO, MBA on 1/20/2023 at 3:52 AM

## 2023-01-21 VITALS
BODY MASS INDEX: 18.61 KG/M2 | OXYGEN SATURATION: 97 % | SYSTOLIC BLOOD PRESSURE: 119 MMHG | RESPIRATION RATE: 16 BRPM | WEIGHT: 130 LBS | DIASTOLIC BLOOD PRESSURE: 66 MMHG | TEMPERATURE: 99 F | HEIGHT: 70 IN | HEART RATE: 81 BPM

## 2023-01-21 LAB
GLUCOSE BLD STRIP.AUTO-MCNC: 181 MG/DL (ref 70–108)
GLUCOSE BLD STRIP.AUTO-MCNC: 194 MG/DL (ref 70–108)

## 2023-01-21 PROCEDURE — 6370000000 HC RX 637 (ALT 250 FOR IP): Performed by: STUDENT IN AN ORGANIZED HEALTH CARE EDUCATION/TRAINING PROGRAM

## 2023-01-21 PROCEDURE — G0378 HOSPITAL OBSERVATION PER HR: HCPCS

## 2023-01-21 PROCEDURE — 99239 HOSP IP/OBS DSCHRG MGMT >30: CPT | Performed by: PHYSICIAN ASSISTANT

## 2023-01-21 PROCEDURE — 6360000002 HC RX W HCPCS: Performed by: STUDENT IN AN ORGANIZED HEALTH CARE EDUCATION/TRAINING PROGRAM

## 2023-01-21 PROCEDURE — 82948 REAGENT STRIP/BLOOD GLUCOSE: CPT

## 2023-01-21 PROCEDURE — 96376 TX/PRO/DX INJ SAME DRUG ADON: CPT

## 2023-01-21 PROCEDURE — 2580000003 HC RX 258: Performed by: STUDENT IN AN ORGANIZED HEALTH CARE EDUCATION/TRAINING PROGRAM

## 2023-01-21 RX ORDER — METOCLOPRAMIDE 10 MG/1
10 TABLET ORAL
Qty: 120 TABLET | Refills: 3 | Status: SHIPPED | OUTPATIENT
Start: 2023-01-21

## 2023-01-21 RX ADMIN — SODIUM CHLORIDE, PRESERVATIVE FREE 10 ML: 5 INJECTION INTRAVENOUS at 08:46

## 2023-01-21 RX ADMIN — ONDANSETRON 4 MG: 2 INJECTION INTRAMUSCULAR; INTRAVENOUS at 08:44

## 2023-01-21 RX ADMIN — ACETAMINOPHEN 650 MG: 325 TABLET ORAL at 08:42

## 2023-01-21 RX ADMIN — GABAPENTIN 400 MG: 400 CAPSULE ORAL at 08:55

## 2023-01-21 RX ADMIN — DULOXETINE HYDROCHLORIDE 60 MG: 60 CAPSULE, DELAYED RELEASE ORAL at 08:55

## 2023-01-21 RX ADMIN — ATORVASTATIN CALCIUM 10 MG: 10 TABLET, FILM COATED ORAL at 08:55

## 2023-01-21 RX ADMIN — GABAPENTIN 400 MG: 400 CAPSULE ORAL at 15:08

## 2023-01-21 RX ADMIN — METOCLOPRAMIDE 10 MG: 5 INJECTION, SOLUTION INTRAMUSCULAR; INTRAVENOUS at 11:53

## 2023-01-21 ASSESSMENT — PAIN - FUNCTIONAL ASSESSMENT: PAIN_FUNCTIONAL_ASSESSMENT: ACTIVITIES ARE NOT PREVENTED

## 2023-01-21 ASSESSMENT — PAIN SCALES - GENERAL: PAINLEVEL_OUTOF10: 8

## 2023-01-21 ASSESSMENT — PAIN DESCRIPTION - ONSET: ONSET: ON-GOING

## 2023-01-21 ASSESSMENT — PAIN DESCRIPTION - ORIENTATION: ORIENTATION: LOWER;MID;UPPER

## 2023-01-21 ASSESSMENT — PAIN DESCRIPTION - FREQUENCY: FREQUENCY: CONTINUOUS

## 2023-01-21 ASSESSMENT — PAIN DESCRIPTION - DESCRIPTORS: DESCRIPTORS: ACHING

## 2023-01-21 NOTE — PLAN OF CARE
Problem: Pain  Goal: Verbalizes/displays adequate comfort level or baseline comfort level  Outcome: Adequate for Discharge  Flowsheets (Taken 1/21/2023 9937)  Verbalizes/displays adequate comfort level or baseline comfort level:   Encourage patient to monitor pain and request assistance   Assess pain using appropriate pain scale   Administer analgesics based on type and severity of pain and evaluate response   Implement non-pharmacological measures as appropriate and evaluate response     Problem: Safety - Adult  Goal: Free from fall injury  Outcome: Adequate for Discharge     Problem: Chronic Conditions and Co-morbidities  Goal: Patient's chronic conditions and co-morbidity symptoms are monitored and maintained or improved  Outcome: Adequate for Discharge  Flowsheets (Taken 1/21/2023 0267)  Care Plan - Patient's Chronic Conditions and Co-Morbidity Symptoms are Monitored and Maintained or Improved: Monitor and assess patient's chronic conditions and comorbid symptoms for stability, deterioration, or improvement     Problem: Skin/Tissue Integrity - Adult  Goal: Skin integrity remains intact  Outcome: Adequate for Discharge     Problem: Musculoskeletal - Adult  Goal: Return mobility to safest level of function  Outcome: Adequate for Discharge   Care plan reviewed with patient. Patient verbalizes understanding of the plan of care and contribute to goal setting.

## 2023-01-21 NOTE — CARE COORDINATION
1/21/23, 1:07 PM EST    Patient goals/plan/ treatment preferences discussed by  and . Patient goals/plan/ treatment preferences reviewed with patient/ family. Patient/ family verbalize understanding of discharge plan and are in agreement with goal/plan/treatment preferences. Understanding was demonstrated using the teach back method. AVS provided by RN at time of discharge, which includes all necessary medical information pertaining to the patients current course of illness, treatment, post-discharge goals of care, and treatment preferences.      Services At/After Discharge: None

## 2023-01-21 NOTE — PROGRESS NOTES
Discharge education and instructions provided. Patient verbalized understanding at this time. No questions asked. IV access removed. All personal belongings, AVS present with patient. Transport to [employer] via wheelchair.

## 2023-01-22 LAB
EKG ATRIAL RATE: 83 BPM
EKG P AXIS: 79 DEGREES
EKG P-R INTERVAL: 132 MS
EKG Q-T INTERVAL: 358 MS
EKG QRS DURATION: 84 MS
EKG QTC CALCULATION (BAZETT): 420 MS
EKG R AXIS: 87 DEGREES
EKG T AXIS: 76 DEGREES
EKG VENTRICULAR RATE: 83 BPM

## 2023-01-22 PROCEDURE — 93010 ELECTROCARDIOGRAM REPORT: CPT | Performed by: INTERNAL MEDICINE

## 2023-01-24 NOTE — DISCHARGE SUMMARY
Hospitalist Discharge Summary        Patient: Thiago Moore  YOB: 1975  MRN: [id number]   Acct: [de-identified]    Primary Care Physician: Ame Samuel, TL, MSN, APRN - CNP    Admit date  1/19/2023    Discharge date: 1/21/2023  4:00 PM    Chief Complaint on presentation :-  Nausa, vomiting, malaise    Discharge Assessment and Plan:-   Gastroparesis, acute on chronic: Pt was able to eat foods without issues prior to DC. Metoclopramide 10mg every 6 hours. ELIESER, Stage 1: Resolved. IVF during hospitalizations. Type 1 DM: Insulin pump continued at current rate of 1unit/hr  Essential HTN: BP controlled. Continue home medications. HLD: Statin. Depression/Anxiety: Continue home medications     Initial H and P and Hospital course:-  Initial H&P \"Karla Craft is a 50 y.o. male with PMHx of Type 1 diabetes, primary HTN, HLD, and depression who presented to [address] [place] [address] with complaint of nausea, vomiting and malaise for the past 3 weeks. He stated that he's only had a few episodes of vomiting but a considerable amount of dry heaves. He has generalized abdominal discomfort. He tried to manage much of this at home and has been able to eat intermittently, but it became unmanageable. He also stated that he recently has new onset headache and subjective fevers. He denies chills, lightheadedness, change in vision, rhinorrhea, congestion, sore throat, cough, hemoptysis, chest pain, palpitations, BUTT, PND, shortness of breath, hematemesis, change in bowel/bladder habits, hematochezia, hematuria, weakness, difficulty with ambulation, numbness/tingling, or known exposure to sick contacts. ED course: CXR without acute findings, CT head without acute intracranial findings, 3L normal saline, 1g Tylenol, and 4mg Zofran\"     1/21: Pt was able to eat foods without issues. Pt was able to walk around the halls.  On the day of discharge, it was explained to the patient that it was very important to follow up with his PCP to have continued care. Appointments were made and information was given. Physical Exam:-  Vitals:   No data found. Weight:   Weight: 130 lb (59 kg)   24 hour intake/output:   No intake or output data in the 24 hours ending 01/24/23 1943    General appearance: Chronically ill appearing white female. No apparent distress, appears stated age and cooperative. HEENT: Normal cephalic, atraumatic without obvious deformity. Pupils equal, round, and reactive to light. Extra ocular muscles intact. Conjunctivae/corneas clear. Neck: Supple, with full range of motion. No jugular venous distention. Trachea midline. Respiratory:  Normal respiratory effort on RA. Breath sounds diminished. Cardiovascular: Regular rate and rhythm with normal S1/S2 without murmurs, rubs or gallops. Abdomen: Soft, non-tender, non-distended with normal bowel sounds. Musculoskeletal:  No clubbing, cyanosis or edema bilaterally. Skin: Skin color, texture, turgor normal.  No rashes or lesions. Neurologic:  Neurovascularly intact without any focal sensory/motor deficits.  Cranial nerves: II-XII intact, grossly non-focal.  Psychiatric: Alert and oriented  Capillary Refill: Brisk,< 3 seconds   Peripheral Pulses: +2 palpable, equal bilaterally       Discharge Medications:-      Medication List        START taking these medications      metoclopramide 10 MG tablet  Commonly known as: REGLAN  Take 1 tablet by mouth 3 times daily (with meals)            CONTINUE taking these medications      cyanocobalamin 1000 MCG tablet     DULoxetine 30 MG extended release capsule  Commonly known as: CYMBALTA     * gabapentin 400 MG capsule  Commonly known as: NEURONTIN     * gabapentin 600 MG tablet  Commonly known as: NEURONTIN     hydrOXYzine HCl 25 MG tablet  Commonly known as: ATARAX     Insulin Pump Leanne     metoprolol tartrate 25 MG tablet  Commonly known as: LOPRESSOR  Take 1 tablet by mouth 2 times daily     simvastatin 20 MG tablet  Commonly known as: ZOCOR  Take 1 tablet by mouth nightly           * This list has 2 medication(s) that are the same as other medications prescribed for you. Read the directions carefully, and ask your doctor or other care provider to review them with you. Where to Get Your Medications        These medications were sent to [address] [facility], [address] [facility] [address]  [address] [facility], [address] [facility] [address]      Phone: [phone]   metoclopramide 10 MG tablet          Labs :-  No results found for this or any previous visit (from the past 72 hour(s)). Microbiology:    Blood culture #1:   Lab Results   Component Value Date/Time    BC No growth-preliminary No growth 11/22/2021 10:45 AM     Urinalysis:      Lab Results   Component Value Date/Time    NITRU NEGATIVE 01/20/2023 12:52 AM    WBCUA 0-2 01/20/2023 12:52 AM    BACTERIA NONE SEEN 01/20/2023 12:52 AM    RBCUA 3-5 01/20/2023 12:52 AM    BLOODU NEGATIVE 01/20/2023 12:52 AM    SPECGRAV 1.015 05/21/2019 08:20 AM    GLUCOSEU 500 01/20/2023 12:52 AM       Radiology:-  CT HEAD WO CONTRAST    Result Date: 1/20/2023  CT head without contrast Comparison: CT - CT HEAD WO CONTR - 07/09/2017 03:49 AM EDT Findings: No acute intracranial hemorrhage. No acute territorial vascular infarction. No mass or mass-effect. No ventriculomegaly. Parenchymal volume loss. Diffuse hypoattenuation in the white matter, nonspecific but likely due to chronic small vessel ischemia. Atherosclerotic calcifications of the carotid siphons. Orbits are unremarkable. The paranasal sinuses and mastoid cells are clear. No acute bony abnormality. Unremarkable soft tissues. Impression: No acute intracranial abnormality.  This document has been electronically signed by: Josh Valle MD on 01/20/2023 12:43 AM All CTs at this facility use dose modulation techniques and iterative reconstructions, and/or weight-based dosing when appropriate to reduce radiation to a low as reasonably achievable. XR CHEST PORTABLE    Result Date: 1/20/2023  1 view chest x-ray Comparison: CR/SR - XR CHEST PORTABLE - 07/26/2020 08:15 PM EDT Findings: The lungs are clear. Normal size heart. No acute fracture. 1. No acute findings.  This document has been electronically signed by: Yogi Jacobs MD on 01/20/2023 12:05 AM       Follow-up scheduled after discharge :-    in the next few days with Brien Landis, MSN, APRN - CNP      Consultations during this hospital stay:-  [] NONE [] Cardiology  [] Nephrology  [] Hemo onco   [] GI   [] ID  [] Endocrine  [] Pulm    [] Neuro    [] Psych   [] Urology  [] ENT   [] G SURGERY   []Ortho    []CV surg    [] Palliative  [] Hospice [] Pain management   []    []TCU   [] PT/OT  OTHERS:-    Disposition: home  Condition at Discharge: Stable    Time Spent:- 40 minutes    Electronically signed by DYLON Moreno on 1/24/23 at 5:23 PM EST   Discharging Hospitalist

## 2023-03-06 NOTE — FLOWSHEET NOTE
05/22/19 1041   Provider Notification   Reason for Communication Evaluate   Provider Name Dr. Bev Mckoy   Provider Notification Physician   Method of Communication Secure Message   Response Waiting for response   Notification Time 1041   Metoprolol 25Mg on MAR. BP 95/68. Would you like parameters on this medications?
English

## 2023-03-27 ENCOUNTER — HOSPITAL ENCOUNTER (EMERGENCY)
Age: 48
Discharge: HOME | End: 2023-03-27
Attending: EMERGENCY MEDICINE
Payer: OTHER GOVERNMENT

## 2023-03-27 VITALS
RESPIRATION RATE: 16 BRPM | SYSTOLIC BLOOD PRESSURE: 125 MMHG | HEIGHT: 70 IN | DIASTOLIC BLOOD PRESSURE: 82 MMHG | TEMPERATURE: 97.3 F | BODY MASS INDEX: 18.61 KG/M2 | HEART RATE: 82 BPM | OXYGEN SATURATION: 100 % | WEIGHT: 130 LBS

## 2023-03-27 DIAGNOSIS — R11.2 NAUSEA AND VOMITING, UNSPECIFIED VOMITING TYPE: ICD-10-CM

## 2023-03-27 DIAGNOSIS — E86.0 DEHYDRATION: Primary | ICD-10-CM

## 2023-03-27 LAB
ALBUMIN SERPL BCG-MCNC: 4.3 G/DL (ref 3.5–5.1)
ALP SERPL-CCNC: 85 U/L (ref 38–126)
ALT SERPL W/O P-5'-P-CCNC: 19 U/L (ref 11–66)
ANION GAP SERPL CALC-SCNC: 10 MEQ/L (ref 8–16)
AST SERPL-CCNC: 20 U/L (ref 5–40)
B-OH-BUTYR SERPL-MSCNC: 21.11 MG/DL (ref 0.2–2.81)
BACTERIA URNS QL MICRO: ABNORMAL /HPF
BASE EXCESS BLDA CALC-SCNC: -0.7 MMOL/L (ref -2–3)
BASOPHILS ABSOLUTE: 0 THOU/MM3 (ref 0–0.1)
BASOPHILS NFR BLD AUTO: 0.4 %
BILIRUB SERPL-MCNC: 0.9 MG/DL (ref 0.3–1.2)
BILIRUB UR QL STRIP.AUTO: NEGATIVE
BUN SERPL-MCNC: 36 MG/DL (ref 7–22)
CALCIUM SERPL-MCNC: 9 MG/DL (ref 8.5–10.5)
CASTS #/AREA URNS LPF: ABNORMAL /LPF
CASTS 2: ABNORMAL /LPF
CHARACTER UR: CLEAR
CHLORIDE SERPL-SCNC: 96 MEQ/L (ref 98–111)
CO2 SERPL-SCNC: 25 MEQ/L (ref 23–33)
COLLECTED BY:: ABNORMAL
COLOR: YELLOW
CREAT SERPL-MCNC: 1.2 MG/DL (ref 0.4–1.2)
CRYSTALS URNS MICRO: ABNORMAL
DEPRECATED RDW RBC AUTO: 38.6 FL (ref 35–45)
EOSINOPHIL NFR BLD AUTO: 0.5 %
EOSINOPHILS ABSOLUTE: 0 THOU/MM3 (ref 0–0.4)
EPITHELIAL CELLS, UA: ABNORMAL /HPF
ERYTHROCYTE [DISTWIDTH] IN BLOOD BY AUTOMATED COUNT: 12.2 % (ref 11.5–14.5)
FLUAV RNA RESP QL NAA+PROBE: NOT DETECTED
FLUBV RNA RESP QL NAA+PROBE: NOT DETECTED
GFR SERPL CREATININE-BSD FRML MDRD: > 60 ML/MIN/1.73M2
GLUCOSE SERPL-MCNC: 95 MG/DL (ref 70–108)
GLUCOSE UR QL STRIP.AUTO: 250 MG/DL
HCO3 BLDA-SCNC: 23 MMOL/L (ref 23–28)
HCT VFR BLD AUTO: 45 % (ref 42–52)
HGB BLD-MCNC: 16.1 GM/DL (ref 14–18)
HGB UR QL STRIP.AUTO: NEGATIVE
IMM GRANULOCYTES # BLD AUTO: 0.02 THOU/MM3 (ref 0–0.07)
IMM GRANULOCYTES NFR BLD AUTO: 0.3 %
KETONES UR QL STRIP.AUTO: 40
LIPASE SERPL-CCNC: 9.4 U/L (ref 5.6–51.3)
LYMPHOCYTES ABSOLUTE: 1.2 THOU/MM3 (ref 1–4.8)
LYMPHOCYTES NFR BLD AUTO: 15.5 %
MCH RBC QN AUTO: 31 PG (ref 26–33)
MCHC RBC AUTO-ENTMCNC: 35.8 GM/DL (ref 32.2–35.5)
MCV RBC AUTO: 86.7 FL (ref 80–94)
MISCELLANEOUS 2: ABNORMAL
MONOCYTES ABSOLUTE: 0.5 THOU/MM3 (ref 0.4–1.3)
MONOCYTES NFR BLD AUTO: 6.4 %
NEUTROPHILS NFR BLD AUTO: 76.9 %
NITRITE UR QL STRIP: NEGATIVE
NRBC BLD AUTO-RTO: 0 /100 WBC
OSMOLALITY SERPL CALC.SUM OF ELEC: 270.8 MOSMOL/KG (ref 275–300)
PCO2 TEMP ADJ BLDMV: 36 MMHG (ref 41–51)
PH BLDMV: 7.42 [PH] (ref 7.31–7.41)
PH UR STRIP.AUTO: 6 [PH] (ref 5–9)
PLATELET # BLD AUTO: 204 THOU/MM3 (ref 130–400)
PMV BLD AUTO: 10.4 FL (ref 9.4–12.4)
PO2 BLDMV: 36 MMHG (ref 25–40)
POTASSIUM SERPL-SCNC: 4.4 MEQ/L (ref 3.5–5.2)
PROT SERPL-MCNC: 6.8 G/DL (ref 6.1–8)
PROT UR STRIP.AUTO-MCNC: 100 MG/DL
RBC # BLD AUTO: 5.19 MILL/MM3 (ref 4.7–6.1)
RBC URINE: ABNORMAL /HPF
RENAL EPI CELLS #/AREA URNS HPF: ABNORMAL /[HPF]
SAO2 % BLDMV: 70 %
SARS-COV-2 RNA RESP QL NAA+PROBE: NOT DETECTED
SEGMENTED NEUTROPHILS ABSOLUTE COUNT: 6.2 THOU/MM3 (ref 1.8–7.7)
SODIUM SERPL-SCNC: 131 MEQ/L (ref 135–145)
SP GR UR REFRACT.AUTO: 1.03 (ref 1–1.03)
TSH SERPL DL<=0.005 MIU/L-ACNC: 1.57 UIU/ML (ref 0.4–4.2)
UROBILINOGEN, URINE: 1 EU/DL (ref 0–1)
WBC # BLD AUTO: 8 THOU/MM3 (ref 4.8–10.8)
WBC #/AREA URNS HPF: ABNORMAL /HPF
WBC #/AREA URNS HPF: NEGATIVE /[HPF]
YEAST LIKE FUNGI URNS QL MICRO: ABNORMAL

## 2023-03-27 PROCEDURE — 84443 ASSAY THYROID STIM HORMONE: CPT

## 2023-03-27 PROCEDURE — 81001 URINALYSIS AUTO W/SCOPE: CPT

## 2023-03-27 PROCEDURE — 87636 SARSCOV2 & INF A&B AMP PRB: CPT

## 2023-03-27 PROCEDURE — 82010 KETONE BODYS QUAN: CPT

## 2023-03-27 PROCEDURE — 82803 BLOOD GASES ANY COMBINATION: CPT

## 2023-03-27 PROCEDURE — 80053 COMPREHEN METABOLIC PANEL: CPT

## 2023-03-27 PROCEDURE — 83690 ASSAY OF LIPASE: CPT

## 2023-03-27 PROCEDURE — 36415 COLL VENOUS BLD VENIPUNCTURE: CPT

## 2023-03-27 PROCEDURE — 6370000000 HC RX 637 (ALT 250 FOR IP)

## 2023-03-27 PROCEDURE — 99283 EMERGENCY DEPT VISIT LOW MDM: CPT

## 2023-03-27 PROCEDURE — 85025 COMPLETE CBC W/AUTO DIFF WBC: CPT

## 2023-03-27 RX ORDER — ONDANSETRON 4 MG/1
4 TABLET, ORALLY DISINTEGRATING ORAL ONCE
Status: COMPLETED | OUTPATIENT
Start: 2023-03-27 | End: 2023-03-27

## 2023-03-27 RX ORDER — METOCLOPRAMIDE HYDROCHLORIDE 5 MG/ML
10 INJECTION INTRAMUSCULAR; INTRAVENOUS ONCE
Status: DISCONTINUED | OUTPATIENT
Start: 2023-03-27 | End: 2023-03-27 | Stop reason: HOSPADM

## 2023-03-27 RX ADMIN — ONDANSETRON 4 MG: 4 TABLET, ORALLY DISINTEGRATING ORAL at 15:00

## 2023-03-27 ASSESSMENT — ENCOUNTER SYMPTOMS
VOMITING: 1
NAUSEA: 1
RESPIRATORY NEGATIVE: 1

## 2023-03-27 NOTE — DISCHARGE INSTRUCTIONS
Drink 3.7L of water every day  Continue to take Reglan for nausea   Follow up with PCP in 3 days for follow up  Return to ED if symptoms persist or worsen

## 2023-03-27 NOTE — ED PROVIDER NOTES
[address], Pt Name: Katie Chávez  MRN: [id number]  [place] 1975  Date of evaluation: 9/12/20      I personally saw and examined the patient. I have reviewed and agree with the Resident findings, including all diagnostic interpretations and treatment plans as written. I was present for the key portion of any procedures performed and the inclusive time noted in any critical care statement. History: This patient was seen with Bhavesh Rivera, resident physician. 58-year-old male who presented to the emergency department with nausea and vomiting, general fatigue. Reports a history of gastroparesis and diabetes. He says his blood sugars have been running about 200. He is clinically well-appearing and sitting up in a chair in the hallway. He is watching videos on his cell phone during my evaluation. pH is 7.42 so there is no evidence of DKA. No elevation of the lipase. He does have some detectable ketones in his urine so is probably a little bit dry. But clinically well-appearing and able to drink fluids  BUN and creatinine are normal.  Had some improvement with antiemetics.       Patient appears to be stable for discharge           Nora Mccarthy,   03/27/23 6257
the patient prior to discharge, with which the patient agrees. MEDICATION CHANGES     Discharge Medication List as of 3/27/2023  3:22 PM            FINAL DISPOSITION      Final diagnoses:   Dehydration   Nausea and vomiting, unspecified vomiting type     Condition: condition: good  Dispo: Discharge to home      This transcription was electronically signed. Parts of this transcriptions may have been dictated by use of voice recognition software and electronically transcribed, and parts may have been transcribed with the assistance of an ED scribe. The transcription may contain errors not detected in proofreading. Please refer to my supervising physician's documentation if my documentation differs.     Electronically Signed: Leisa Montana DO, 03/27/23, 10:01 PM          Leisa Montana DO  Resident  03/27/23 8176

## 2023-06-08 NOTE — PLAN OF CARE
Detail Level: Detailed
Problem: Pain Control  Goal: Maintain pain level at or below patient's acceptable level (or 5 if patient is unable to determine acceptable level)  Outcome: Ongoing  Patient stated having headache rating 7/10. Patient denied tylenol. Problem: Cardiovascular  Goal: Hemodynamic stability  Outcome: Ongoing  Vitals:    09/18/18 2232   BP: (!) 147/83   Pulse: 64   Resp: 18   Temp: 98.3 °F (36.8 °C)   SpO2: 95%     I/O last 3 completed shifts: In: 5213.3 [P.O.:1250; I.V.:3963.3]  Out: 7697 [Urine:3215; Emesis/NG output:300]  No intake/output data recorded. Problem: GI  Goal: No bowel complications  Outcome: Ongoing  Patient has had no bowel movement this shift. Bowel sounds active in all 4 quadrants. Problem: Nutrition  Goal: Optimal nutrition therapy  Outcome: Ongoing  Patient eating and drinking appropriately. Patient eating all of meals. Problem: Skin Integrity/Risk  Goal: No skin breakdown during hospitalization  Outcome: Ongoing  No new skin breakdown this shift. Patient turns self. Problem: Falls - Risk of:  Goal: Will remain free from falls  Will remain free from falls   Outcome: Ongoing  No falls this shift. Patient in bed most of this shift will bed alarm on. Problem: Serum Glucose Level - Abnormal:  Goal: Ability to maintain appropriate glucose levels has stabilized  Ability to maintain appropriate glucose levels has stabilized   Outcome: Ongoing  Patient's blood sugars remain controlled with personal insulin pump. Blood sugars continuing to be monitored. Problem: Discharge Planning:  Goal: Discharged to appropriate level of care  Discharged to appropriate level of care   Outcome: Ongoing  Patient plans to discharge home with mom. Comments:  Care plan reviewed with patient. Patient verbalizes understanding of the plan of care and contribute to goal setting.
understanding of the plan of care and contribute to goal setting.

## 2023-08-20 ENCOUNTER — HOSPITAL ENCOUNTER (OUTPATIENT)
Age: 48
Setting detail: OBSERVATION
Discharge: HOME | End: 2023-08-21
Attending: EMERGENCY MEDICINE
Payer: OTHER GOVERNMENT

## 2023-08-20 ENCOUNTER — APPOINTMENT (OUTPATIENT)
Dept: GENERAL RADIOLOGY | Age: 48
End: 2023-08-20
Payer: OTHER GOVERNMENT

## 2023-08-20 ENCOUNTER — HOSPITAL ENCOUNTER (EMERGENCY)
Age: 48
Discharge: LEFT BEFORE DISCHARGE | End: 2023-08-20
Attending: EMERGENCY MEDICINE
Payer: OTHER GOVERNMENT

## 2023-08-20 VITALS
BODY MASS INDEX: 18.61 KG/M2 | WEIGHT: 130 LBS | DIASTOLIC BLOOD PRESSURE: 74 MMHG | RESPIRATION RATE: 13 BRPM | SYSTOLIC BLOOD PRESSURE: 121 MMHG | HEIGHT: 70 IN | OXYGEN SATURATION: 100 % | TEMPERATURE: 97.5 F | HEART RATE: 77 BPM

## 2023-08-20 DIAGNOSIS — I24.9 ACS (ACUTE CORONARY SYNDROME) (HCC): Primary | ICD-10-CM

## 2023-08-20 DIAGNOSIS — R07.9 CHEST PAIN, UNSPECIFIED TYPE: ICD-10-CM

## 2023-08-20 DIAGNOSIS — R07.9 CHEST PAIN, UNSPECIFIED TYPE: Primary | ICD-10-CM

## 2023-08-20 LAB
ALBUMIN SERPL BCG-MCNC: 3.7 G/DL (ref 3.5–5.1)
ALP SERPL-CCNC: 74 U/L (ref 38–126)
ALT SERPL W/O P-5'-P-CCNC: 15 U/L (ref 11–66)
ANION GAP SERPL CALC-SCNC: 12 MEQ/L (ref 8–16)
ANION GAP SERPL CALC-SCNC: 7 MEQ/L (ref 8–16)
APTT PPP: 27.5 SECONDS (ref 22–38)
AST SERPL-CCNC: 18 U/L (ref 5–40)
B-OH-BUTYR SERPL-MSCNC: 8.03 MG/DL (ref 0.2–2.81)
BASOPHILS ABSOLUTE: 0 THOU/MM3 (ref 0–0.1)
BASOPHILS ABSOLUTE: 0 THOU/MM3 (ref 0–0.1)
BASOPHILS NFR BLD AUTO: 0.5 %
BASOPHILS NFR BLD AUTO: 0.7 %
BILIRUB SERPL-MCNC: 0.4 MG/DL (ref 0.3–1.2)
BUN SERPL-MCNC: 32 MG/DL (ref 7–22)
BUN SERPL-MCNC: 37 MG/DL (ref 7–22)
CALCIUM SERPL-MCNC: 8.7 MG/DL (ref 8.5–10.5)
CALCIUM SERPL-MCNC: 9 MG/DL (ref 8.5–10.5)
CHLORIDE SERPL-SCNC: 98 MEQ/L (ref 98–111)
CHLORIDE SERPL-SCNC: 99 MEQ/L (ref 98–111)
CO2 SERPL-SCNC: 26 MEQ/L (ref 23–33)
CO2 SERPL-SCNC: 31 MEQ/L (ref 23–33)
CREAT SERPL-MCNC: 1 MG/DL (ref 0.4–1.2)
CREAT SERPL-MCNC: 1.2 MG/DL (ref 0.4–1.2)
DEPRECATED RDW RBC AUTO: 38.3 FL (ref 35–45)
DEPRECATED RDW RBC AUTO: 39.1 FL (ref 35–45)
EOSINOPHIL NFR BLD AUTO: 1.5 %
EOSINOPHIL NFR BLD AUTO: 3.9 %
EOSINOPHILS ABSOLUTE: 0.1 THOU/MM3 (ref 0–0.4)
EOSINOPHILS ABSOLUTE: 0.3 THOU/MM3 (ref 0–0.4)
ERYTHROCYTE [DISTWIDTH] IN BLOOD BY AUTOMATED COUNT: 11.9 % (ref 11.5–14.5)
ERYTHROCYTE [DISTWIDTH] IN BLOOD BY AUTOMATED COUNT: 12.1 % (ref 11.5–14.5)
FLUAV RNA RESP QL NAA+PROBE: NOT DETECTED
FLUBV RNA RESP QL NAA+PROBE: NOT DETECTED
GFR SERPL CREATININE-BSD FRML MDRD: > 60 ML/MIN/1.73M2
GFR SERPL CREATININE-BSD FRML MDRD: > 60 ML/MIN/1.73M2
GLUCOSE BLD STRIP.AUTO-MCNC: 243 MG/DL (ref 70–108)
GLUCOSE SERPL-MCNC: 223 MG/DL (ref 70–108)
GLUCOSE SERPL-MCNC: 389 MG/DL (ref 70–108)
HCT VFR BLD AUTO: 41.4 % (ref 42–52)
HCT VFR BLD AUTO: 43.8 % (ref 42–52)
HEPARIN UNFRACTIONATED: 0.05 U/ML (ref 0.3–0.7)
HGB BLD-MCNC: 14.6 GM/DL (ref 14–18)
HGB BLD-MCNC: 15.3 GM/DL (ref 14–18)
IMM GRANULOCYTES # BLD AUTO: 0.02 THOU/MM3 (ref 0–0.07)
IMM GRANULOCYTES # BLD AUTO: 0.02 THOU/MM3 (ref 0–0.07)
IMM GRANULOCYTES NFR BLD AUTO: 0.3 %
IMM GRANULOCYTES NFR BLD AUTO: 0.3 %
INR PPP: 0.88 (ref 0.85–1.13)
LYMPHOCYTES ABSOLUTE: 1.3 THOU/MM3 (ref 1–4.8)
LYMPHOCYTES ABSOLUTE: 1.7 THOU/MM3 (ref 1–4.8)
LYMPHOCYTES NFR BLD AUTO: 19.7 %
LYMPHOCYTES NFR BLD AUTO: 24.2 %
MCH RBC QN AUTO: 30.9 PG (ref 26–33)
MCH RBC QN AUTO: 31 PG (ref 26–33)
MCHC RBC AUTO-ENTMCNC: 34.9 GM/DL (ref 32.2–35.5)
MCHC RBC AUTO-ENTMCNC: 35.3 GM/DL (ref 32.2–35.5)
MCV RBC AUTO: 87.9 FL (ref 80–94)
MCV RBC AUTO: 88.5 FL (ref 80–94)
MONOCYTES ABSOLUTE: 0.3 THOU/MM3 (ref 0.4–1.3)
MONOCYTES ABSOLUTE: 0.5 THOU/MM3 (ref 0.4–1.3)
MONOCYTES NFR BLD AUTO: 5.3 %
MONOCYTES NFR BLD AUTO: 6.9 %
NEUTROPHILS NFR BLD AUTO: 64 %
NEUTROPHILS NFR BLD AUTO: 72.7 %
NRBC BLD AUTO-RTO: 0 /100 WBC
NRBC BLD AUTO-RTO: 0 /100 WBC
NT-PROBNP SERPL IA-MCNC: 40.2 PG/ML (ref 0–124)
NT-PROBNP SERPL IA-MCNC: < 36 PG/ML (ref 0–124)
OSMOLALITY SERPL CALC.SUM OF ELEC: 287.6 MOSMOL/KG (ref 275–300)
OSMOLALITY SERPL CALC.SUM OF ELEC: 296.9 MOSMOL/KG (ref 275–300)
PLATELET # BLD AUTO: 192 THOU/MM3 (ref 130–400)
PLATELET # BLD AUTO: 207 THOU/MM3 (ref 130–400)
PMV BLD AUTO: 11 FL (ref 9.4–12.4)
PMV BLD AUTO: 11.4 FL (ref 9.4–12.4)
POTASSIUM SERPL-SCNC: 4.4 MEQ/L (ref 3.5–5.2)
POTASSIUM SERPL-SCNC: 4.5 MEQ/L (ref 3.5–5.2)
PROT SERPL-MCNC: 6 G/DL (ref 6.1–8)
RBC # BLD AUTO: 4.71 MILL/MM3 (ref 4.7–6.1)
RBC # BLD AUTO: 4.95 MILL/MM3 (ref 4.7–6.1)
SARS-COV-2 RNA RESP QL NAA+PROBE: NOT DETECTED
SEGMENTED NEUTROPHILS ABSOLUTE COUNT: 4.5 THOU/MM3 (ref 1.8–7.7)
SEGMENTED NEUTROPHILS ABSOLUTE COUNT: 4.8 THOU/MM3 (ref 1.8–7.7)
SODIUM SERPL-SCNC: 136 MEQ/L (ref 135–145)
SODIUM SERPL-SCNC: 137 MEQ/L (ref 135–145)
TROPONIN, HIGH SENSITIVITY: 19 NG/L (ref 0–12)
TROPONIN, HIGH SENSITIVITY: 20 NG/L (ref 0–12)
TROPONIN, HIGH SENSITIVITY: 24 NG/L (ref 0–12)
WBC # BLD AUTO: 6.6 THOU/MM3 (ref 4.8–10.8)
WBC # BLD AUTO: 7.1 THOU/MM3 (ref 4.8–10.8)

## 2023-08-20 PROCEDURE — 93005 ELECTROCARDIOGRAM TRACING: CPT | Performed by: EMERGENCY MEDICINE

## 2023-08-20 PROCEDURE — 93005 ELECTROCARDIOGRAM TRACING: CPT

## 2023-08-20 PROCEDURE — 99285 EMERGENCY DEPT VISIT HI MDM: CPT

## 2023-08-20 PROCEDURE — 96376 TX/PRO/DX INJ SAME DRUG ADON: CPT

## 2023-08-20 PROCEDURE — 85025 COMPLETE CBC W/AUTO DIFF WBC: CPT

## 2023-08-20 PROCEDURE — 6370000000 HC RX 637 (ALT 250 FOR IP): Performed by: EMERGENCY MEDICINE

## 2023-08-20 PROCEDURE — 36415 COLL VENOUS BLD VENIPUNCTURE: CPT

## 2023-08-20 PROCEDURE — 85520 HEPARIN ASSAY: CPT

## 2023-08-20 PROCEDURE — 85730 THROMBOPLASTIN TIME PARTIAL: CPT

## 2023-08-20 PROCEDURE — 83880 ASSAY OF NATRIURETIC PEPTIDE: CPT

## 2023-08-20 PROCEDURE — 80053 COMPREHEN METABOLIC PANEL: CPT

## 2023-08-20 PROCEDURE — 82948 REAGENT STRIP/BLOOD GLUCOSE: CPT

## 2023-08-20 PROCEDURE — 84484 ASSAY OF TROPONIN QUANT: CPT

## 2023-08-20 PROCEDURE — 99255 IP/OBS CONSLTJ NEW/EST HI 80: CPT

## 2023-08-20 PROCEDURE — 6360000002 HC RX W HCPCS: Performed by: EMERGENCY MEDICINE

## 2023-08-20 PROCEDURE — 6370000000 HC RX 637 (ALT 250 FOR IP)

## 2023-08-20 PROCEDURE — 71045 X-RAY EXAM CHEST 1 VIEW: CPT

## 2023-08-20 PROCEDURE — 96365 THER/PROPH/DIAG IV INF INIT: CPT

## 2023-08-20 PROCEDURE — G0378 HOSPITAL OBSERVATION PER HR: HCPCS

## 2023-08-20 PROCEDURE — 2580000003 HC RX 258

## 2023-08-20 PROCEDURE — 87636 SARSCOV2 & INF A&B AMP PRB: CPT

## 2023-08-20 PROCEDURE — 85610 PROTHROMBIN TIME: CPT

## 2023-08-20 PROCEDURE — 2580000003 HC RX 258: Performed by: EMERGENCY MEDICINE

## 2023-08-20 PROCEDURE — 82010 KETONE BODYS QUAN: CPT

## 2023-08-20 RX ORDER — POTASSIUM CHLORIDE 7.45 MG/ML
10 INJECTION INTRAVENOUS PRN
Status: DISCONTINUED | OUTPATIENT
Start: 2023-08-20 | End: 2023-08-21 | Stop reason: HOSPADM

## 2023-08-20 RX ORDER — HEPARIN SODIUM 1000 [USP'U]/ML
30 INJECTION, SOLUTION INTRAVENOUS; SUBCUTANEOUS PRN
Status: DISCONTINUED | OUTPATIENT
Start: 2023-08-20 | End: 2023-08-20 | Stop reason: HOSPADM

## 2023-08-20 RX ORDER — HEPARIN SODIUM 10000 [USP'U]/100ML
5-30 INJECTION, SOLUTION INTRAVENOUS CONTINUOUS
Status: DISCONTINUED | OUTPATIENT
Start: 2023-08-20 | End: 2023-08-21

## 2023-08-20 RX ORDER — 0.9 % SODIUM CHLORIDE 0.9 %
1000 INTRAVENOUS SOLUTION INTRAVENOUS ONCE
Status: COMPLETED | OUTPATIENT
Start: 2023-08-20 | End: 2023-08-20

## 2023-08-20 RX ORDER — DEXTROSE MONOHYDRATE 100 MG/ML
INJECTION, SOLUTION INTRAVENOUS CONTINUOUS PRN
Status: DISCONTINUED | OUTPATIENT
Start: 2023-08-20 | End: 2023-08-21 | Stop reason: HOSPADM

## 2023-08-20 RX ORDER — HYDROXYZINE HYDROCHLORIDE 25 MG/1
25 TABLET, FILM COATED ORAL NIGHTLY
Status: DISCONTINUED | OUTPATIENT
Start: 2023-08-20 | End: 2023-08-21 | Stop reason: HOSPADM

## 2023-08-20 RX ORDER — HEPARIN SODIUM 1000 [USP'U]/ML
60 INJECTION, SOLUTION INTRAVENOUS; SUBCUTANEOUS PRN
Status: DISCONTINUED | OUTPATIENT
Start: 2023-08-20 | End: 2023-08-20 | Stop reason: HOSPADM

## 2023-08-20 RX ORDER — ATORVASTATIN CALCIUM 10 MG/1
10 TABLET, FILM COATED ORAL DAILY
Status: DISCONTINUED | OUTPATIENT
Start: 2023-08-21 | End: 2023-08-21 | Stop reason: HOSPADM

## 2023-08-20 RX ORDER — ASPIRIN 81 MG/1
324 TABLET, CHEWABLE ORAL ONCE
Status: COMPLETED | OUTPATIENT
Start: 2023-08-20 | End: 2023-08-20

## 2023-08-20 RX ORDER — NITROGLYCERIN 20 MG/100ML
5-200 INJECTION INTRAVENOUS CONTINUOUS
Status: DISCONTINUED | OUTPATIENT
Start: 2023-08-20 | End: 2023-08-21

## 2023-08-20 RX ORDER — INSULIN LISPRO 100 [IU]/ML
0-4 INJECTION, SOLUTION INTRAVENOUS; SUBCUTANEOUS
Status: DISCONTINUED | OUTPATIENT
Start: 2023-08-21 | End: 2023-08-21

## 2023-08-20 RX ORDER — METOCLOPRAMIDE 10 MG/1
10 TABLET ORAL
Status: DISCONTINUED | OUTPATIENT
Start: 2023-08-21 | End: 2023-08-21 | Stop reason: HOSPADM

## 2023-08-20 RX ORDER — SODIUM CHLORIDE 9 MG/ML
INJECTION, SOLUTION INTRAVENOUS PRN
Status: DISCONTINUED | OUTPATIENT
Start: 2023-08-20 | End: 2023-08-21 | Stop reason: HOSPADM

## 2023-08-20 RX ORDER — MAGNESIUM SULFATE IN WATER 40 MG/ML
2000 INJECTION, SOLUTION INTRAVENOUS PRN
Status: DISCONTINUED | OUTPATIENT
Start: 2023-08-20 | End: 2023-08-21 | Stop reason: HOSPADM

## 2023-08-20 RX ORDER — VITAMIN B COMPLEX
1000 TABLET ORAL DAILY
Status: DISCONTINUED | OUTPATIENT
Start: 2023-08-21 | End: 2023-08-21 | Stop reason: HOSPADM

## 2023-08-20 RX ORDER — HEPARIN SODIUM 1000 [USP'U]/ML
60 INJECTION, SOLUTION INTRAVENOUS; SUBCUTANEOUS ONCE
Status: COMPLETED | OUTPATIENT
Start: 2023-08-20 | End: 2023-08-20

## 2023-08-20 RX ORDER — HEPARIN SODIUM 1000 [USP'U]/ML
60 INJECTION, SOLUTION INTRAVENOUS; SUBCUTANEOUS PRN
Status: DISCONTINUED | OUTPATIENT
Start: 2023-08-21 | End: 2023-08-21

## 2023-08-20 RX ORDER — GABAPENTIN 400 MG/1
400 CAPSULE ORAL 3 TIMES DAILY
Status: DISCONTINUED | OUTPATIENT
Start: 2023-08-20 | End: 2023-08-21 | Stop reason: HOSPADM

## 2023-08-20 RX ORDER — LANOLIN ALCOHOL/MO/W.PET/CERES
1000 CREAM (GRAM) TOPICAL DAILY
Status: DISCONTINUED | OUTPATIENT
Start: 2023-08-21 | End: 2023-08-21 | Stop reason: HOSPADM

## 2023-08-20 RX ORDER — HEPARIN SODIUM 1000 [USP'U]/ML
60 INJECTION, SOLUTION INTRAVENOUS; SUBCUTANEOUS ONCE
Status: DISCONTINUED | OUTPATIENT
Start: 2023-08-20 | End: 2023-08-20 | Stop reason: HOSPADM

## 2023-08-20 RX ORDER — INSULIN LISPRO 100 [IU]/ML
0-4 INJECTION, SOLUTION INTRAVENOUS; SUBCUTANEOUS NIGHTLY
Status: DISCONTINUED | OUTPATIENT
Start: 2023-08-20 | End: 2023-08-21

## 2023-08-20 RX ORDER — SODIUM CHLORIDE 0.9 % (FLUSH) 0.9 %
5-40 SYRINGE (ML) INJECTION EVERY 12 HOURS SCHEDULED
Status: DISCONTINUED | OUTPATIENT
Start: 2023-08-20 | End: 2023-08-21 | Stop reason: HOSPADM

## 2023-08-20 RX ORDER — HEPARIN SODIUM 10000 [USP'U]/100ML
5-30 INJECTION, SOLUTION INTRAVENOUS CONTINUOUS
Status: DISCONTINUED | OUTPATIENT
Start: 2023-08-20 | End: 2023-08-20 | Stop reason: HOSPADM

## 2023-08-20 RX ORDER — ONDANSETRON 2 MG/ML
4 INJECTION INTRAMUSCULAR; INTRAVENOUS EVERY 6 HOURS PRN
Status: DISCONTINUED | OUTPATIENT
Start: 2023-08-20 | End: 2023-08-21

## 2023-08-20 RX ORDER — HEPARIN SODIUM 1000 [USP'U]/ML
30 INJECTION, SOLUTION INTRAVENOUS; SUBCUTANEOUS PRN
Status: DISCONTINUED | OUTPATIENT
Start: 2023-08-21 | End: 2023-08-21

## 2023-08-20 RX ORDER — ONDANSETRON 4 MG/1
4 TABLET, ORALLY DISINTEGRATING ORAL EVERY 8 HOURS PRN
Status: DISCONTINUED | OUTPATIENT
Start: 2023-08-20 | End: 2023-08-21

## 2023-08-20 RX ORDER — NITROGLYCERIN 20 MG/100ML
5-200 INJECTION INTRAVENOUS CONTINUOUS
Status: DISCONTINUED | OUTPATIENT
Start: 2023-08-20 | End: 2023-08-20 | Stop reason: HOSPADM

## 2023-08-20 RX ORDER — INSULIN GLARGINE 100 [IU]/ML
5 INJECTION, SOLUTION SUBCUTANEOUS NIGHTLY
Status: DISCONTINUED | OUTPATIENT
Start: 2023-08-20 | End: 2023-08-21

## 2023-08-20 RX ORDER — DULOXETIN HYDROCHLORIDE 60 MG/1
60 CAPSULE, DELAYED RELEASE ORAL DAILY
Status: DISCONTINUED | OUTPATIENT
Start: 2023-08-21 | End: 2023-08-21 | Stop reason: HOSPADM

## 2023-08-20 RX ORDER — ACETAMINOPHEN 650 MG/1
650 SUPPOSITORY RECTAL EVERY 6 HOURS PRN
Status: DISCONTINUED | OUTPATIENT
Start: 2023-08-20 | End: 2023-08-21 | Stop reason: HOSPADM

## 2023-08-20 RX ORDER — POTASSIUM CHLORIDE 20 MEQ/1
40 TABLET, EXTENDED RELEASE ORAL PRN
Status: DISCONTINUED | OUTPATIENT
Start: 2023-08-20 | End: 2023-08-21 | Stop reason: HOSPADM

## 2023-08-20 RX ORDER — SODIUM CHLORIDE 0.9 % (FLUSH) 0.9 %
5-40 SYRINGE (ML) INJECTION PRN
Status: DISCONTINUED | OUTPATIENT
Start: 2023-08-20 | End: 2023-08-21 | Stop reason: HOSPADM

## 2023-08-20 RX ORDER — ACETAMINOPHEN 325 MG/1
650 TABLET ORAL EVERY 6 HOURS PRN
Status: DISCONTINUED | OUTPATIENT
Start: 2023-08-20 | End: 2023-08-21 | Stop reason: HOSPADM

## 2023-08-20 RX ORDER — POLYETHYLENE GLYCOL 3350 17 G/17G
17 POWDER, FOR SOLUTION ORAL DAILY PRN
Status: DISCONTINUED | OUTPATIENT
Start: 2023-08-20 | End: 2023-08-21 | Stop reason: HOSPADM

## 2023-08-20 RX ADMIN — Medication: at 22:20

## 2023-08-20 RX ADMIN — SODIUM CHLORIDE 1000 ML: 9 INJECTION, SOLUTION INTRAVENOUS at 11:03

## 2023-08-20 RX ADMIN — SODIUM CHLORIDE 1000 ML: 9 INJECTION, SOLUTION INTRAVENOUS at 22:56

## 2023-08-20 RX ADMIN — HEPARIN SODIUM AND DEXTROSE 12 UNITS/KG/HR: 10000; 5 INJECTION INTRAVENOUS at 23:01

## 2023-08-20 RX ADMIN — ASPIRIN 81 MG 324 MG: 81 TABLET ORAL at 11:24

## 2023-08-20 RX ADMIN — HEPARIN SODIUM 3540 UNITS: 1000 INJECTION INTRAVENOUS; SUBCUTANEOUS at 22:57

## 2023-08-20 ASSESSMENT — PAIN DESCRIPTION - ORIENTATION: ORIENTATION: MID

## 2023-08-20 ASSESSMENT — ENCOUNTER SYMPTOMS
RHINORRHEA: 0
COUGH: 0
EYE DISCHARGE: 0
SORE THROAT: 0
EYE PAIN: 0
CHEST TIGHTNESS: 0
VOICE CHANGE: 0
DIARRHEA: 0
ABDOMINAL PAIN: 0
ABDOMINAL DISTENTION: 0
EYE REDNESS: 0
SINUS PRESSURE: 0
TROUBLE SWALLOWING: 0
NAUSEA: 0
CONSTIPATION: 0
SHORTNESS OF BREATH: 0
EYE ITCHING: 0
PHOTOPHOBIA: 0
BACK PAIN: 0
CHOKING: 0
VOMITING: 0
WHEEZING: 0
BLOOD IN STOOL: 0

## 2023-08-20 ASSESSMENT — PAIN - FUNCTIONAL ASSESSMENT
PAIN_FUNCTIONAL_ASSESSMENT: 0-10
PAIN_FUNCTIONAL_ASSESSMENT: 0-10
PAIN_FUNCTIONAL_ASSESSMENT: ACTIVITIES ARE NOT PREVENTED
PAIN_FUNCTIONAL_ASSESSMENT: 0-10

## 2023-08-20 ASSESSMENT — PAIN DESCRIPTION - PAIN TYPE: TYPE: ACUTE PAIN

## 2023-08-20 ASSESSMENT — HEART SCORE
ECG: 1
ECG: 1

## 2023-08-20 ASSESSMENT — PAIN DESCRIPTION - FREQUENCY: FREQUENCY: CONTINUOUS

## 2023-08-20 ASSESSMENT — PAIN DESCRIPTION - LOCATION
LOCATION: HEAD
LOCATION: GENERALIZED
LOCATION: BACK

## 2023-08-20 ASSESSMENT — PAIN SCALES - GENERAL
PAINLEVEL_OUTOF10: 6
PAINLEVEL_OUTOF10: 7

## 2023-08-20 ASSESSMENT — PAIN DESCRIPTION - ONSET: ONSET: ON-GOING

## 2023-08-20 ASSESSMENT — PAIN DESCRIPTION - DESCRIPTORS: DESCRIPTORS: SHOOTING;SHARP

## 2023-08-20 NOTE — ED NOTES
Patient resting in bed. Respirations easy and unlabored. No distress noted. Call light within reach. Patient does not want heparin and nitro at this time. Patient is not sure if he can be admitted. Dr. Don Joyce at bedside.             Dolly Hernández RN  08/20/23 7005

## 2023-08-20 NOTE — ED NOTES
Patient resting in bed. Respirations easy and unlabored. No distress noted. Call light within reach.        Sergo Lam RN  08/20/23 3021

## 2023-08-20 NOTE — ED PROVIDER NOTES
ATTENDING NOTE:    I supervised and discussed the history, physical exam and the management of this patient with the resident. I reviewed the resident's note and agree with the documented findings and plan of care. Please see my additional note. Patient with history of diabetes also long-term smoker, hypertension and hyperlipidemia who presented to the emergency department due to nausea, generalized weakness, vomited 6 times yesterday, also having what he described as \"mild discomfort, kind of sharp when I tried to breathe\" in his chest area. He states that carrying a case of nuggets at [facility] yesterday caused him to have discomfort in his chest.  When he rests his discomfort gets better. He denied chest pain or shortness of breath while in the emergency department. He states these are mainly occurring when he does anything strenuous. He he has never had a stress test or heart catheterization. Patient's EKG was reviewed, changes were noted. Dr. Davida Rodgers sent EKG via [employer] to Dr. Stefano Georges. Aspirin was given. Ordered nitro gtt. initial troponin resulted at 24, ordered heparin drip. Concerned for ACS. Discussed admission with patient, he will not agree to admission despite multiple attempts to convince patient by myself and Dr. Davida Rodgers. Patient states that he has responsibilities at home, that he pays his mother's bills who is ill and he has other responsibilities. He states he appreciates the care we have provided, stating \"you guys have been great and I understand that you're worried I could die or something bad could happen to me, but I have other responsibilities and I can't stay in the hospital\". Patient advised he is welcome to return to the ED at any time. I personally saw and examined the patient. I have reviewed and agree with the resident's findings, including all diagnostic interpretations and treatment plans as written.   I was present for the key portion of any procedures performed

## 2023-08-20 NOTE — ED PROVIDER NOTES
[facility] DEPT  EMERGENCY DEPARTMENT ENCOUNTER          Pt Name: Lexie Garcia  MRN: [id number]  [address] [facility] [place] 1975  Date of evaluation: 8/20/2023  Physician: Victorino Sanchez MD  Supervising Attending Physician: Dr. Kem Schultz       Chief Complaint   Patient presents with    Generalized Body Aches         HISTORY OF PRESENT ILLNESS    HPI  Lexie Garcia is a 50 y.o. male who presents to the emergency department from home, by private vehicle for evaluation of headache, on nausea and generalized body aches. Patient reports that 2 days ago he began to feel ill. Patient reports that he has felt weak and his whole body is sore. Patient reports that he threw up about 6 times yesterday. Patient denies any blood. Patient denies any cough, fever or chills. Patient also reports shortness of breath at this time. Patient reports that he had to leave work early yesterday and today. Patient reports congestion. Patient also reports some associated dizziness. Patient states that he feels like he will fall over due to his weakness. Patient reports that while at work moving a 36 pound box of [employer] and began to have chest pain and worsening shortness of breath patient described as a sharp pain in the center of her chest.  Patient denies being around anyone sick. Patient reports some occasional abdominal pain however that its normal with his gastroparesis. The patient has no other acute complaints at this time.           PAST MEDICAL AND SURGICAL HISTORY     Past Medical History:   Diagnosis Date    Diabetes mellitus ([address])      Past Surgical History:   Procedure Laterality Date    ABDOMEN SURGERY      CATARACT REMOVAL Bilateral     DENTAL SURGERY      EYE SURGERY           MEDICATIONS     Current Facility-Administered Medications:     heparin (porcine) injection 3,540 Units, 60 Units/kg, IntraVENous, Once, Scotty Rodriguez MD    heparin (porcine) injection 3,540 Units,

## 2023-08-21 VITALS
SYSTOLIC BLOOD PRESSURE: 139 MMHG | TEMPERATURE: 98 F | OXYGEN SATURATION: 100 % | BODY MASS INDEX: 18.43 KG/M2 | RESPIRATION RATE: 16 BRPM | WEIGHT: 128.75 LBS | HEIGHT: 70 IN | HEART RATE: 75 BPM | DIASTOLIC BLOOD PRESSURE: 87 MMHG

## 2023-08-21 LAB
ANION GAP SERPL CALC-SCNC: 8 MEQ/L (ref 8–16)
APTT PPP: 26.9 SECONDS (ref 22–38)
BASOPHILS ABSOLUTE: 0 THOU/MM3 (ref 0–0.1)
BASOPHILS NFR BLD AUTO: 0.7 %
BUN SERPL-MCNC: 23 MG/DL (ref 7–22)
CA-I BLD ISE-SCNC: 1.18 MMOL/L (ref 1.12–1.32)
CALCIUM SERPL-MCNC: 8.2 MG/DL (ref 8.5–10.5)
CHLORIDE SERPL-SCNC: 102 MEQ/L (ref 98–111)
CHOLEST SERPL-MCNC: 145 MG/DL (ref 100–199)
CO2 SERPL-SCNC: 28 MEQ/L (ref 23–33)
CREAT SERPL-MCNC: 0.9 MG/DL (ref 0.4–1.2)
DEPRECATED MEAN GLUCOSE BLD GHB EST-ACNC: 264 MG/DL (ref 70–126)
DEPRECATED RDW RBC AUTO: 38.9 FL (ref 35–45)
EOSINOPHIL NFR BLD AUTO: 4.2 %
EOSINOPHILS ABSOLUTE: 0.2 THOU/MM3 (ref 0–0.4)
ERYTHROCYTE [DISTWIDTH] IN BLOOD BY AUTOMATED COUNT: 12 % (ref 11.5–14.5)
GFR SERPL CREATININE-BSD FRML MDRD: > 60 ML/MIN/1.73M2
GLUCOSE BLD STRIP.AUTO-MCNC: 209 MG/DL (ref 70–108)
GLUCOSE BLD STRIP.AUTO-MCNC: 274 MG/DL (ref 70–108)
GLUCOSE BLD STRIP.AUTO-MCNC: 337 MG/DL (ref 70–108)
GLUCOSE SERPL-MCNC: 154 MG/DL (ref 70–108)
HBA1C MFR BLD HPLC: 10.8 % (ref 4.4–6.4)
HCT VFR BLD AUTO: 37.6 % (ref 42–52)
HDLC SERPL-MCNC: 54 MG/DL
HEPARIN UNFRACTIONATED: 0.04 U/ML (ref 0.3–0.7)
HGB BLD-MCNC: 13.3 GM/DL (ref 14–18)
IMM GRANULOCYTES # BLD AUTO: 0 THOU/MM3 (ref 0–0.07)
IMM GRANULOCYTES NFR BLD AUTO: 0 %
INR PPP: 0.85 (ref 0.85–1.13)
LDLC SERPL CALC-MCNC: 76 MG/DL
LYMPHOCYTES ABSOLUTE: 2.3 THOU/MM3 (ref 1–4.8)
LYMPHOCYTES NFR BLD AUTO: 38.9 %
MAGNESIUM SERPL-MCNC: 2 MG/DL (ref 1.6–2.4)
MCH RBC QN AUTO: 31.6 PG (ref 26–33)
MCHC RBC AUTO-ENTMCNC: 35.4 GM/DL (ref 32.2–35.5)
MCV RBC AUTO: 89.3 FL (ref 80–94)
MONOCYTES ABSOLUTE: 0.4 THOU/MM3 (ref 0.4–1.3)
MONOCYTES NFR BLD AUTO: 7.1 %
NEUTROPHILS NFR BLD AUTO: 49.1 %
NRBC BLD AUTO-RTO: 0 /100 WBC
PHOSPHATE SERPL-MCNC: 3 MG/DL (ref 2.4–4.7)
PLATELET # BLD AUTO: 173 THOU/MM3 (ref 130–400)
PMV BLD AUTO: 10.9 FL (ref 9.4–12.4)
POTASSIUM SERPL-SCNC: 3.8 MEQ/L (ref 3.5–5.2)
RBC # BLD AUTO: 4.21 MILL/MM3 (ref 4.7–6.1)
SEGMENTED NEUTROPHILS ABSOLUTE COUNT: 2.8 THOU/MM3 (ref 1.8–7.7)
SODIUM SERPL-SCNC: 138 MEQ/L (ref 135–145)
TRIGL SERPL-MCNC: 75 MG/DL (ref 0–199)
WBC # BLD AUTO: 5.8 THOU/MM3 (ref 4.8–10.8)

## 2023-08-21 PROCEDURE — 93005 ELECTROCARDIOGRAM TRACING: CPT

## 2023-08-21 PROCEDURE — 93010 ELECTROCARDIOGRAM REPORT: CPT | Performed by: INTERNAL MEDICINE

## 2023-08-21 PROCEDURE — 80061 LIPID PANEL: CPT

## 2023-08-21 PROCEDURE — 82330 ASSAY OF CALCIUM: CPT

## 2023-08-21 PROCEDURE — 85025 COMPLETE CBC W/AUTO DIFF WBC: CPT

## 2023-08-21 PROCEDURE — 96376 TX/PRO/DX INJ SAME DRUG ADON: CPT

## 2023-08-21 PROCEDURE — 6370000000 HC RX 637 (ALT 250 FOR IP)

## 2023-08-21 PROCEDURE — G0378 HOSPITAL OBSERVATION PER HR: HCPCS

## 2023-08-21 PROCEDURE — 84100 ASSAY OF PHOSPHORUS: CPT

## 2023-08-21 PROCEDURE — 2580000003 HC RX 258

## 2023-08-21 PROCEDURE — 80048 BASIC METABOLIC PNL TOTAL CA: CPT

## 2023-08-21 PROCEDURE — 83735 ASSAY OF MAGNESIUM: CPT

## 2023-08-21 PROCEDURE — 82948 REAGENT STRIP/BLOOD GLUCOSE: CPT

## 2023-08-21 PROCEDURE — 99239 HOSP IP/OBS DSCHRG MGMT >30: CPT | Performed by: PHYSICIAN ASSISTANT

## 2023-08-21 PROCEDURE — 83036 HEMOGLOBIN GLYCOSYLATED A1C: CPT

## 2023-08-21 PROCEDURE — 36415 COLL VENOUS BLD VENIPUNCTURE: CPT

## 2023-08-21 RX ORDER — PROCHLORPERAZINE EDISYLATE 5 MG/ML
10 INJECTION INTRAMUSCULAR; INTRAVENOUS EVERY 6 HOURS PRN
Status: DISCONTINUED | OUTPATIENT
Start: 2023-08-21 | End: 2023-08-21 | Stop reason: HOSPADM

## 2023-08-21 RX ORDER — PANTOPRAZOLE SODIUM 40 MG/1
40 TABLET, DELAYED RELEASE ORAL
Qty: 30 TABLET | Refills: 3 | Status: SHIPPED | OUTPATIENT
Start: 2023-08-22

## 2023-08-21 RX ORDER — LIDOCAINE 4 G/G
1 PATCH TOPICAL DAILY
Status: DISCONTINUED | OUTPATIENT
Start: 2023-08-21 | End: 2023-08-21 | Stop reason: HOSPADM

## 2023-08-21 RX ORDER — PANTOPRAZOLE SODIUM 40 MG/1
40 TABLET, DELAYED RELEASE ORAL
Status: DISCONTINUED | OUTPATIENT
Start: 2023-08-21 | End: 2023-08-21 | Stop reason: HOSPADM

## 2023-08-21 RX ORDER — POTASSIUM CHLORIDE 20 MEQ/1
20 TABLET, EXTENDED RELEASE ORAL ONCE
Status: COMPLETED | OUTPATIENT
Start: 2023-08-21 | End: 2023-08-21

## 2023-08-21 RX ADMIN — DULOXETINE HYDROCHLORIDE 60 MG: 60 CAPSULE, DELAYED RELEASE ORAL at 08:53

## 2023-08-21 RX ADMIN — GABAPENTIN 400 MG: 400 CAPSULE ORAL at 00:51

## 2023-08-21 RX ADMIN — SODIUM CHLORIDE, PRESERVATIVE FREE 10 ML: 5 INJECTION INTRAVENOUS at 08:53

## 2023-08-21 RX ADMIN — METOPROLOL TARTRATE 25 MG: 25 TABLET, FILM COATED ORAL at 08:53

## 2023-08-21 RX ADMIN — ATORVASTATIN CALCIUM 10 MG: 10 TABLET, FILM COATED ORAL at 08:53

## 2023-08-21 RX ADMIN — METOPROLOL TARTRATE 25 MG: 25 TABLET, FILM COATED ORAL at 00:51

## 2023-08-21 RX ADMIN — Medication 1000 UNITS: at 08:53

## 2023-08-21 RX ADMIN — HYDROXYZINE HYDROCHLORIDE 25 MG: 25 TABLET, FILM COATED ORAL at 00:51

## 2023-08-21 RX ADMIN — POTASSIUM CHLORIDE 20 MEQ: 1500 TABLET, EXTENDED RELEASE ORAL at 06:08

## 2023-08-21 RX ADMIN — Medication 1000 MCG: at 08:53

## 2023-08-21 RX ADMIN — PANTOPRAZOLE SODIUM 40 MG: 40 TABLET, DELAYED RELEASE ORAL at 06:07

## 2023-08-21 NOTE — ED TRIAGE NOTES
Patient presents to ED from home with complaints of a headache and weakness, patient states he was here earlier and was going to be admitted, unsure what for, but left AMA. Patient states headache and weakness for the past few days. Patient is alert and oriented, ambulatory, and vitals are stable at this time.

## 2023-08-21 NOTE — ED NOTES
RN spoke with Hank Esquivel NP who states he wants to try GI cocktail first before starting Nitro and Heparin.       Hiro Escobar RN  08/20/23 7080

## 2023-08-21 NOTE — ED NOTES
Patient transported to  [facility] by cart in stable condition. Patient monitored on cardiac telemetry. IV line is patent.        Rossy Gaming, EMT-P  08/20/23 2492

## 2023-08-21 NOTE — ED PROVIDER NOTES
[facility]  eMERGENCY dEPARTMENT eNCOUnter          CHIEF COMPLAINT       Chief Complaint   Patient presents with    Headache    Fatigue       Nurses Notes reviewed and I agree except as noted in the HPI. HISTORY OF PRESENT ILLNESS    Sebastián Castro is a 50 y.o. male who presents headache nausea generalized body aches. Patient starts complaining about chest pain. Patient was here earlier for this. Patient was telling the prior doctor that he had chest pain when he got up and walked, he also had some dizziness. Patient states that he felt the chest pain in the center of her chest.,  He states he thinks maybe his left arm got a little numb. He was seen here and they were going to admit him for ACS. They had already started him on heparin, he thought he had too much to do at home so he signed himself out [facility]. He comes back today complaining of increasing chest pain. Patient is not hypoxic. But he still having chest pain when he gets up and walks and he has chest pain when he is moving. Patient is otherwise resting comfortably on the cot no apparent distress no other physical complaints at this time. Patient has no known cardiac history. He had no kidney dysfunction. Patient had an elevated troponin. Patient does smoke. He does have hypertension hypercholesterolemia and he has diabetes. Patient had already been given aspirin, he will be given some fluids, patient is complaining of 7 out of 10 pain. We will give him some nitroglycerin and start him on heparin and have him admitted. REVIEW OF SYSTEMS     Review of Systems   Constitutional:  Negative for activity change, appetite change, diaphoresis, fatigue and unexpected weight change. HENT:  Negative for congestion, ear discharge, ear pain, hearing loss, rhinorrhea, sinus pressure, sore throat, trouble swallowing and voice change. Eyes:  Negative for photophobia, pain, discharge, redness and itching.

## 2023-08-21 NOTE — CARE COORDINATION
Case Management Assessment  Initial Evaluation    Date/Time of Evaluation: 8/21/2023 8:14 AM  Assessment Completed by: Caleb Gomez RN    If patient is discharged prior to next notation, then this note serves as note for discharge by case management. Patient Name: Jose Moser                   YOB: 1975  Diagnosis: Chest pain [R07.9]  Chest pain, unspecified type [R07.9]                   Date / Time: 8/20/2023  8:42 PM  Location: [address] [place] [address]     Patient Admission Status: Observation   Readmission Risk Low 0-14, Mod 15-19), High > 20: No data recorded  Current PCP: KAYDEN Messer CNP  PCP verified by CM? Chart Reviewed: {YES / MY:55775}      History Provided by:    Patient Orientation:      Patient Cognition:      Hospitalization in the last 30 days (Readmission):  {YES / NO:60128}    If yes, Readmission Assessment in  Navigator will be completed. Advance Directives:      Code Status: Full Code   Patient's Primary Decision Maker is: Legal Next of Kin      Discharge Planning:    Patient lives with: Alone Type of Home: House  Primary Care Giver:    Patient Support Systems include: Parent   Current Financial resources:    Current community resources:    [facility]: [facility]            Current DME:              Type of Home Care services:  None    ADLS  Prior functional level:    Current functional level:      Family can provide assistance at DC: Would you like Case Management to discuss the discharge plan with any other family members/significant others, and if so, who?     Plans to Return to Present Housing:    Other Identified Issues/Barriers to RETURNING to current housing: ***  Potential Assistance needed at discharge: N/A            Potential DME:    Patient expects to discharge to[address] for transportation at discharge:      Financial    Payor: [place] [employer] / Plan: [place] [employer] / Product Type: *No Product type* /     Does insurance require precert

## 2023-08-21 NOTE — PROGRESS NOTES
Patient admitted to [address] [place] [address] from ED  No complaints upon arrival to the room. IV site free of s/s of infection or infiltration. Vital signs obtained. Assessment and data collection initiated. Oriented to room. Policies and procedures for 8A explained All questions answered with no further questions at this time. Fall prevention and safety brochure discussed with patient. 2 person skin check completed with [facility] RN. Patient declines PCP notification. Patient declines family notification.

## 2023-08-26 LAB
EKG ATRIAL RATE: 70 BPM
EKG ATRIAL RATE: 71 BPM
EKG ATRIAL RATE: 75 BPM
EKG ATRIAL RATE: 82 BPM
EKG P AXIS: 71 DEGREES
EKG P AXIS: 74 DEGREES
EKG P AXIS: 85 DEGREES
EKG P AXIS: 89 DEGREES
EKG P-R INTERVAL: 122 MS
EKG P-R INTERVAL: 128 MS
EKG P-R INTERVAL: 130 MS
EKG P-R INTERVAL: 136 MS
EKG Q-T INTERVAL: 364 MS
EKG Q-T INTERVAL: 386 MS
EKG Q-T INTERVAL: 430 MS
EKG Q-T INTERVAL: 434 MS
EKG QRS DURATION: 72 MS
EKG QRS DURATION: 78 MS
EKG QRS DURATION: 82 MS
EKG QRS DURATION: 88 MS
EKG QTC CALCULATION (BAZETT): 425 MS
EKG QTC CALCULATION (BAZETT): 431 MS
EKG QTC CALCULATION (BAZETT): 464 MS
EKG QTC CALCULATION (BAZETT): 471 MS
EKG R AXIS: 81 DEGREES
EKG R AXIS: 87 DEGREES
EKG R AXIS: 89 DEGREES
EKG R AXIS: 98 DEGREES
EKG T AXIS: 59 DEGREES
EKG T AXIS: 72 DEGREES
EKG T AXIS: 86 DEGREES
EKG T AXIS: 87 DEGREES
EKG VENTRICULAR RATE: 70 BPM
EKG VENTRICULAR RATE: 71 BPM
EKG VENTRICULAR RATE: 75 BPM
EKG VENTRICULAR RATE: 82 BPM

## 2023-10-14 ENCOUNTER — HOSPITAL ENCOUNTER (EMERGENCY)
Age: 48
Discharge: HOME | End: 2023-10-14
Attending: EMERGENCY MEDICINE
Payer: OTHER GOVERNMENT

## 2023-10-14 VITALS
RESPIRATION RATE: 16 BRPM | HEIGHT: 70 IN | WEIGHT: 130 LBS | BODY MASS INDEX: 18.61 KG/M2 | TEMPERATURE: 98.4 F | OXYGEN SATURATION: 100 % | HEART RATE: 72 BPM | DIASTOLIC BLOOD PRESSURE: 80 MMHG | SYSTOLIC BLOOD PRESSURE: 134 MMHG

## 2023-10-14 DIAGNOSIS — R10.84 GENERALIZED ABDOMINAL PAIN: Primary | ICD-10-CM

## 2023-10-14 DIAGNOSIS — R11.0 NAUSEA: ICD-10-CM

## 2023-10-14 LAB
ALBUMIN SERPL BCG-MCNC: 3.4 G/DL (ref 3.5–5.1)
ALP SERPL-CCNC: 66 U/L (ref 38–126)
ALT SERPL W/O P-5'-P-CCNC: 13 U/L (ref 11–66)
ANION GAP SERPL CALC-SCNC: 9 MEQ/L (ref 8–16)
AST SERPL-CCNC: 16 U/L (ref 5–40)
BASOPHILS ABSOLUTE: 0 THOU/MM3 (ref 0–0.1)
BASOPHILS NFR BLD AUTO: 0.5 %
BILIRUB SERPL-MCNC: 0.6 MG/DL (ref 0.3–1.2)
BUN SERPL-MCNC: 15 MG/DL (ref 7–22)
CALCIUM SERPL-MCNC: 8.4 MG/DL (ref 8.5–10.5)
CHLORIDE SERPL-SCNC: 101 MEQ/L (ref 98–111)
CO2 SERPL-SCNC: 28 MEQ/L (ref 23–33)
CREAT SERPL-MCNC: 1 MG/DL (ref 0.4–1.2)
DEPRECATED RDW RBC AUTO: 35.8 FL (ref 35–45)
EOSINOPHIL NFR BLD AUTO: 2.6 %
EOSINOPHILS ABSOLUTE: 0.2 THOU/MM3 (ref 0–0.4)
ERYTHROCYTE [DISTWIDTH] IN BLOOD BY AUTOMATED COUNT: 11.8 % (ref 11.5–14.5)
FLUAV RNA RESP QL NAA+PROBE: NOT DETECTED
FLUBV RNA RESP QL NAA+PROBE: NOT DETECTED
GFR SERPL CREATININE-BSD FRML MDRD: > 60 ML/MIN/1.73M2
GLUCOSE SERPL-MCNC: 362 MG/DL (ref 70–108)
HCT VFR BLD AUTO: 39.2 % (ref 42–52)
HGB BLD-MCNC: 14.2 GM/DL (ref 14–18)
IMM GRANULOCYTES # BLD AUTO: 0.01 THOU/MM3 (ref 0–0.07)
IMM GRANULOCYTES NFR BLD AUTO: 0.2 %
LIPASE SERPL-CCNC: 10.6 U/L (ref 5.6–51.3)
LYMPHOCYTES ABSOLUTE: 1.3 THOU/MM3 (ref 1–4.8)
LYMPHOCYTES NFR BLD AUTO: 21.2 %
MCH RBC QN AUTO: 30.9 PG (ref 26–33)
MCHC RBC AUTO-ENTMCNC: 36.2 GM/DL (ref 32.2–35.5)
MCV RBC AUTO: 85.2 FL (ref 80–94)
MONOCYTES ABSOLUTE: 0.5 THOU/MM3 (ref 0.4–1.3)
MONOCYTES NFR BLD AUTO: 7.8 %
NEUTROPHILS NFR BLD AUTO: 67.7 %
NRBC BLD AUTO-RTO: 0 /100 WBC
OSMOLALITY SERPL CALC.SUM OF ELEC: 291.1 MOSMOL/KG (ref 275–300)
PLATELET # BLD AUTO: 159 THOU/MM3 (ref 130–400)
PMV BLD AUTO: 10.4 FL (ref 9.4–12.4)
POTASSIUM SERPL-SCNC: 4.4 MEQ/L (ref 3.5–5.2)
PROT SERPL-MCNC: 5.3 G/DL (ref 6.1–8)
RBC # BLD AUTO: 4.6 MILL/MM3 (ref 4.7–6.1)
SARS-COV-2 RNA RESP QL NAA+PROBE: NOT DETECTED
SEGMENTED NEUTROPHILS ABSOLUTE COUNT: 4.1 THOU/MM3 (ref 1.8–7.7)
SODIUM SERPL-SCNC: 138 MEQ/L (ref 135–145)
WBC # BLD AUTO: 6.1 THOU/MM3 (ref 4.8–10.8)

## 2023-10-14 PROCEDURE — A4216 STERILE WATER/SALINE, 10 ML: HCPCS | Performed by: EMERGENCY MEDICINE

## 2023-10-14 PROCEDURE — 99284 EMERGENCY DEPT VISIT MOD MDM: CPT

## 2023-10-14 PROCEDURE — 2500000003 HC RX 250 WO HCPCS: Performed by: EMERGENCY MEDICINE

## 2023-10-14 PROCEDURE — 2580000003 HC RX 258: Performed by: EMERGENCY MEDICINE

## 2023-10-14 PROCEDURE — 80053 COMPREHEN METABOLIC PANEL: CPT

## 2023-10-14 PROCEDURE — 96374 THER/PROPH/DIAG INJ IV PUSH: CPT

## 2023-10-14 PROCEDURE — 87636 SARSCOV2 & INF A&B AMP PRB: CPT

## 2023-10-14 PROCEDURE — 36415 COLL VENOUS BLD VENIPUNCTURE: CPT

## 2023-10-14 PROCEDURE — 6370000000 HC RX 637 (ALT 250 FOR IP): Performed by: EMERGENCY MEDICINE

## 2023-10-14 PROCEDURE — 83690 ASSAY OF LIPASE: CPT

## 2023-10-14 PROCEDURE — 96375 TX/PRO/DX INJ NEW DRUG ADDON: CPT

## 2023-10-14 PROCEDURE — 85025 COMPLETE CBC W/AUTO DIFF WBC: CPT

## 2023-10-14 PROCEDURE — 6360000002 HC RX W HCPCS: Performed by: EMERGENCY MEDICINE

## 2023-10-14 RX ORDER — ONDANSETRON 2 MG/ML
4 INJECTION INTRAMUSCULAR; INTRAVENOUS ONCE
Status: COMPLETED | OUTPATIENT
Start: 2023-10-14 | End: 2023-10-14

## 2023-10-14 RX ORDER — 0.9 % SODIUM CHLORIDE 0.9 %
1000 INTRAVENOUS SOLUTION INTRAVENOUS ONCE
Status: COMPLETED | OUTPATIENT
Start: 2023-10-14 | End: 2023-10-14

## 2023-10-14 RX ORDER — CALCIUM CARBONATE 500 MG/1
1 TABLET, CHEWABLE ORAL DAILY
Qty: 30 TABLET | Refills: 0 | Status: SHIPPED | OUTPATIENT
Start: 2023-10-14 | End: 2023-11-13

## 2023-10-14 RX ORDER — PANTOPRAZOLE SODIUM 40 MG/1
40 TABLET, DELAYED RELEASE ORAL
Qty: 30 TABLET | Refills: 0 | Status: SHIPPED | OUTPATIENT
Start: 2023-10-14

## 2023-10-14 RX ORDER — ONDANSETRON 4 MG/1
4 TABLET, FILM COATED ORAL 3 TIMES DAILY PRN
Qty: 9 TABLET | Refills: 0 | Status: SHIPPED | OUTPATIENT
Start: 2023-10-14 | End: 2023-10-17

## 2023-10-14 RX ADMIN — ALUMINUM HYDROXIDE, MAGNESIUM HYDROXIDE, AND SIMETHICONE: 200; 200; 20 SUSPENSION ORAL at 19:39

## 2023-10-14 RX ADMIN — ONDANSETRON 4 MG: 2 INJECTION INTRAMUSCULAR; INTRAVENOUS at 19:39

## 2023-10-14 RX ADMIN — FAMOTIDINE 20 MG: 10 INJECTION, SOLUTION INTRAVENOUS at 19:39

## 2023-10-14 RX ADMIN — SODIUM CHLORIDE 1000 ML: 9 INJECTION, SOLUTION INTRAVENOUS at 18:01

## 2023-10-14 ASSESSMENT — PAIN SCALES - GENERAL: PAINLEVEL_OUTOF10: 3

## 2023-10-14 ASSESSMENT — PAIN - FUNCTIONAL ASSESSMENT
PAIN_FUNCTIONAL_ASSESSMENT: 0-10
PAIN_FUNCTIONAL_ASSESSMENT: NONE - DENIES PAIN

## 2023-10-14 NOTE — DISCHARGE INSTRUCTIONS
You were seen at [facility] emergency department for abdominal pain with nausea. Labs showed that your glucose is >300, which is likely contributing to your symptoms. Please schedule an appointment with your primary doctor to discuss keeping your glucose under control. Please read the attached information on abdominal pain. If your symptoms get worse, return to the ER.

## 2023-10-14 NOTE — ED TRIAGE NOTES
Patient to ED for complaint of abdominal pain and vomiting since Thursday. Patient reports that he is feeling very fatigued. Patient is a type 1 diabetic on an insulin pump. He reports that his sugars have been running in the 220s.

## 2023-10-14 NOTE — ED NOTES
X2 IV attempts to re-establish IV access at this time unsuccessful.       Alonzo Wilkins RN  10/14/23 1912

## 2023-10-14 NOTE — ED NOTES
This RN to medicate patient. Patient's right bicep IV site appears to have infiltrated. Patient reports \"tightness\" of his arm and minimal pain 1/10 on a 1-10 scale. Patient states his symptoms have improved since arriving to ED. Patient is resting in bed with easy and unlabored respirations. Patient denies further complaints or concerns.           Brett Mota RN  10/14/23 2563